# Patient Record
Sex: FEMALE | Race: WHITE | Employment: OTHER | URBAN - METROPOLITAN AREA
[De-identification: names, ages, dates, MRNs, and addresses within clinical notes are randomized per-mention and may not be internally consistent; named-entity substitution may affect disease eponyms.]

---

## 2020-06-06 ENCOUNTER — APPOINTMENT (OUTPATIENT)
Dept: GENERAL RADIOLOGY | Age: 80
End: 2020-06-06
Payer: MEDICARE

## 2020-06-06 ENCOUNTER — HOSPITAL ENCOUNTER (EMERGENCY)
Age: 80
Discharge: HOME | End: 2020-06-06
Payer: MEDICARE

## 2020-06-06 VITALS
TEMPERATURE: 97.7 F | HEIGHT: 63 IN | BODY MASS INDEX: 25.8 KG/M2 | SYSTOLIC BLOOD PRESSURE: 96 MMHG | OXYGEN SATURATION: 96 % | HEART RATE: 76 BPM | DIASTOLIC BLOOD PRESSURE: 55 MMHG | WEIGHT: 145.6 LBS | RESPIRATION RATE: 16 BRPM

## 2020-06-06 PROCEDURE — 73562 X-RAY EXAM OF KNEE 3: CPT

## 2020-06-06 PROCEDURE — 99283 EMERGENCY DEPT VISIT LOW MDM: CPT

## 2020-06-06 RX ORDER — LIDOCAINE 50 MG/G
OINTMENT TOPICAL
Qty: 30 G | Refills: 0 | Status: SHIPPED | OUTPATIENT
Start: 2020-06-06

## 2020-06-06 ASSESSMENT — PAIN DESCRIPTION - ORIENTATION
ORIENTATION: LEFT
ORIENTATION: LEFT

## 2020-06-06 ASSESSMENT — PAIN DESCRIPTION - PAIN TYPE
TYPE: ACUTE PAIN
TYPE: ACUTE PAIN

## 2020-06-06 ASSESSMENT — PAIN DESCRIPTION - LOCATION
LOCATION: KNEE
LOCATION: KNEE

## 2020-06-06 ASSESSMENT — ENCOUNTER SYMPTOMS
ABDOMINAL PAIN: 0
COUGH: 0
BACK PAIN: 0
SHORTNESS OF BREATH: 0

## 2020-06-06 ASSESSMENT — PAIN DESCRIPTION - DESCRIPTORS: DESCRIPTORS: SHARP

## 2020-06-06 ASSESSMENT — PAIN SCALES - GENERAL
PAINLEVEL_OUTOF10: 7
PAINLEVEL_OUTOF10: 8

## 2020-06-06 NOTE — ED PROVIDER NOTES
laxity. Tenderness found. Legs:    Skin:     General: Skin is warm and dry. Neurological:      Mental Status: She is alert and oriented to person, place, and time. Deep Tendon Reflexes: Reflexes are normal and symmetric. Psychiatric:         Judgment: Judgment normal.           All other labs were within normal range or not returned as of this dictation. EMERGENCY DEPARTMENT COURSE and DIFFERENTIALDIAGNOSIS/MDM:   Vitals:    Vitals:    06/06/20 1540   BP: (!) 96/55   Pulse: 76   Resp: 16   Temp: 97.7 °F (36.5 °C)   TempSrc: Oral   SpO2: 96%   Weight: 145 lb 9.6 oz (66 kg)   Height: 5' 3\" (1.6 m)            [de-identified] yr old female with L knee Arthritis. Prescription for Lidocaine ointment was given to the patient. F/U with orthopedics in 1-2 days. Patient/daughter verbalizes understanding. PROCEDURES:  Unless otherwise noted below, none     Procedures      FINAL IMPRESSION      1.  Osteoarthritis of left knee, unspecified osteoarthritis type          DISPOSITION/PLAN   DISPOSITION Decision To Discharge 06/06/2020 04:47:15 PM          RONAL Nichols CNP (electronically signed)  Attending Emergency Physician     RONAL Nichols CNP  06/06/20 2715

## 2020-06-06 NOTE — ED NOTES
D/C instructions and rx x 1 given to patient and daughter by JESSICA Henry. No further complaints. Ambulated out with steady gait in no acute distress.      Rasheed Chapman RN  06/06/20 3691

## 2021-03-25 ENCOUNTER — HOSPITAL ENCOUNTER (EMERGENCY)
Age: 81
Discharge: HOME | End: 2021-03-25
Attending: EMERGENCY MEDICINE
Payer: MEDICARE

## 2021-03-25 ENCOUNTER — APPOINTMENT (OUTPATIENT)
Dept: CT IMAGING | Age: 81
End: 2021-03-25
Payer: MEDICARE

## 2021-03-25 VITALS
HEIGHT: 63 IN | OXYGEN SATURATION: 100 % | RESPIRATION RATE: 16 BRPM | BODY MASS INDEX: 23.04 KG/M2 | SYSTOLIC BLOOD PRESSURE: 178 MMHG | HEART RATE: 70 BPM | TEMPERATURE: 97.3 F | WEIGHT: 130 LBS | DIASTOLIC BLOOD PRESSURE: 82 MMHG

## 2021-03-25 DIAGNOSIS — N30.00 ACUTE CYSTITIS WITHOUT HEMATURIA: Primary | ICD-10-CM

## 2021-03-25 LAB
ALBUMIN SERPL-MCNC: 3.8 G/DL (ref 3.5–4.6)
ALP BLD-CCNC: 84 U/L (ref 40–130)
ALT SERPL-CCNC: 11 U/L (ref 0–33)
ANION GAP SERPL CALCULATED.3IONS-SCNC: 10 MEQ/L (ref 9–15)
AST SERPL-CCNC: 27 U/L (ref 0–35)
BACTERIA: ABNORMAL /HPF
BASOPHILS ABSOLUTE: 0.1 K/UL (ref 0–0.2)
BASOPHILS RELATIVE PERCENT: 0.8 %
BILIRUB SERPL-MCNC: 0.6 MG/DL (ref 0.2–0.7)
BILIRUBIN URINE: NEGATIVE
BLOOD, URINE: ABNORMAL
BUN BLDV-MCNC: 15 MG/DL (ref 8–23)
CALCIUM SERPL-MCNC: 9.6 MG/DL (ref 8.5–9.9)
CHLORIDE BLD-SCNC: 98 MEQ/L (ref 95–107)
CLARITY: CLEAR
CO2: 27 MEQ/L (ref 20–31)
COLOR: ABNORMAL
CREAT SERPL-MCNC: 0.85 MG/DL (ref 0.5–0.9)
EOSINOPHILS ABSOLUTE: 0.5 K/UL (ref 0–0.7)
EOSINOPHILS RELATIVE PERCENT: 7.3 %
EPITHELIAL CELLS, UA: ABNORMAL /HPF (ref 0–5)
GFR AFRICAN AMERICAN: >60
GFR NON-AFRICAN AMERICAN: >60
GLOBULIN: 3.5 G/DL (ref 2.3–3.5)
GLUCOSE BLD-MCNC: 91 MG/DL (ref 70–99)
GLUCOSE URINE: NEGATIVE MG/DL
HCT VFR BLD CALC: 40 % (ref 37–47)
HEMOGLOBIN: 13.3 G/DL (ref 12–16)
HYALINE CASTS: ABNORMAL /HPF (ref 0–5)
KETONES, URINE: ABNORMAL MG/DL
LEUKOCYTE ESTERASE, URINE: ABNORMAL
LIPASE: 37 U/L (ref 12–95)
LYMPHOCYTES ABSOLUTE: 1.5 K/UL (ref 1–4.8)
LYMPHOCYTES RELATIVE PERCENT: 21 %
MCH RBC QN AUTO: 29.2 PG (ref 27–31.3)
MCHC RBC AUTO-ENTMCNC: 33.3 % (ref 33–37)
MCV RBC AUTO: 87.6 FL (ref 82–100)
MONOCYTES ABSOLUTE: 0.6 K/UL (ref 0.2–0.8)
MONOCYTES RELATIVE PERCENT: 7.7 %
NEUTROPHILS ABSOLUTE: 4.6 K/UL (ref 1.4–6.5)
NEUTROPHILS RELATIVE PERCENT: 63.2 %
NITRITE, URINE: NEGATIVE
PDW BLD-RTO: 14.5 % (ref 11.5–14.5)
PH UA: 6 (ref 5–9)
PLATELET # BLD: 348 K/UL (ref 130–400)
PLATELET SLIDE REVIEW: NORMAL
POTASSIUM SERPL-SCNC: 5 MEQ/L (ref 3.4–4.9)
PROTEIN UA: ABNORMAL MG/DL
RBC # BLD: 4.57 M/UL (ref 4.2–5.4)
RBC UA: ABNORMAL /HPF (ref 0–5)
SLIDE REVIEW: NORMAL
SODIUM BLD-SCNC: 135 MEQ/L (ref 135–144)
SPECIFIC GRAVITY UA: 1.02 (ref 1–1.03)
TOTAL PROTEIN: 7.3 G/DL (ref 6.3–8)
URINE REFLEX TO CULTURE: YES
UROBILINOGEN, URINE: 1 E.U./DL
WBC # BLD: 7.3 K/UL (ref 4.8–10.8)
WBC UA: ABNORMAL /HPF (ref 0–5)

## 2021-03-25 PROCEDURE — 6370000000 HC RX 637 (ALT 250 FOR IP): Performed by: PERSONAL EMERGENCY RESPONSE ATTENDANT

## 2021-03-25 PROCEDURE — 87077 CULTURE AEROBIC IDENTIFY: CPT

## 2021-03-25 PROCEDURE — 36415 COLL VENOUS BLD VENIPUNCTURE: CPT

## 2021-03-25 PROCEDURE — 87086 URINE CULTURE/COLONY COUNT: CPT

## 2021-03-25 PROCEDURE — 81001 URINALYSIS AUTO W/SCOPE: CPT

## 2021-03-25 PROCEDURE — 85025 COMPLETE CBC W/AUTO DIFF WBC: CPT

## 2021-03-25 PROCEDURE — 74176 CT ABD & PELVIS W/O CONTRAST: CPT

## 2021-03-25 PROCEDURE — 80053 COMPREHEN METABOLIC PANEL: CPT

## 2021-03-25 PROCEDURE — 96375 TX/PRO/DX INJ NEW DRUG ADDON: CPT

## 2021-03-25 PROCEDURE — 99284 EMERGENCY DEPT VISIT MOD MDM: CPT

## 2021-03-25 PROCEDURE — 96374 THER/PROPH/DIAG INJ IV PUSH: CPT

## 2021-03-25 PROCEDURE — 6360000002 HC RX W HCPCS: Performed by: PHYSICIAN ASSISTANT

## 2021-03-25 PROCEDURE — 83690 ASSAY OF LIPASE: CPT

## 2021-03-25 RX ORDER — CEPHALEXIN 500 MG/1
1000 CAPSULE ORAL ONCE
Status: COMPLETED | OUTPATIENT
Start: 2021-03-25 | End: 2021-03-25

## 2021-03-25 RX ORDER — MORPHINE SULFATE 2 MG/ML
4 INJECTION, SOLUTION INTRAMUSCULAR; INTRAVENOUS ONCE
Status: COMPLETED | OUTPATIENT
Start: 2021-03-25 | End: 2021-03-25

## 2021-03-25 RX ORDER — ONDANSETRON 2 MG/ML
4 INJECTION INTRAMUSCULAR; INTRAVENOUS ONCE
Status: COMPLETED | OUTPATIENT
Start: 2021-03-25 | End: 2021-03-25

## 2021-03-25 RX ORDER — CEPHALEXIN 500 MG/1
1000 CAPSULE ORAL 2 TIMES DAILY
Qty: 28 CAPSULE | Refills: 0 | Status: SHIPPED | OUTPATIENT
Start: 2021-03-25 | End: 2021-04-01

## 2021-03-25 RX ADMIN — MORPHINE SULFATE 4 MG: 2 INJECTION, SOLUTION INTRAMUSCULAR; INTRAVENOUS at 17:52

## 2021-03-25 RX ADMIN — CEPHALEXIN 1000 MG: 500 CAPSULE ORAL at 20:49

## 2021-03-25 RX ADMIN — ONDANSETRON 4 MG: 2 INJECTION INTRAMUSCULAR; INTRAVENOUS at 17:52

## 2021-03-25 ASSESSMENT — ENCOUNTER SYMPTOMS
ABDOMINAL PAIN: 1
ABDOMINAL DISTENTION: 0
RHINORRHEA: 0
BACK PAIN: 1
SHORTNESS OF BREATH: 0
CONSTIPATION: 0
EYE DISCHARGE: 0
COLOR CHANGE: 0
VOMITING: 0
NAUSEA: 1
DIARRHEA: 0
SORE THROAT: 0

## 2021-03-25 ASSESSMENT — PAIN DESCRIPTION - ORIENTATION: ORIENTATION: LOWER

## 2021-03-25 ASSESSMENT — PAIN DESCRIPTION - PAIN TYPE: TYPE: ACUTE PAIN

## 2021-03-25 NOTE — ED PROVIDER NOTES
[address] [facility] ED  eMERGENCY dEPARTMENT eNCOUnter      Pt Name: Felicity Betts  MRN: [id number]  Dalegfjj 1940  Date of evaluation: 3/25/2021  Provider: Shirley Leyva Dr       Chief Complaint   Patient presents with    Abdominal Pain     pt c/o ABD pain for the past two days, denies N/V/D         HISTORY OF PRESENT ILLNESS   (Location/Symptom, Timing/Onset,Context/Setting, Quality, Duration, Modifying Factors, Severity)  Note limiting factors. Felicity Betts is a 79 y.o. female who presents to the emergency department complaint of abdominal pain which patient states started this morning for her. She states her pain is on the low suprapubic area with radiation to the right and left lower quadrant of the abdomen, there is no guarding or rebound, nausea, but no vomiting, she denies any urinary complaints, no constipation or diarrhea. She is rating her current pain at this time is a 10 out of 10 but she seems very comfortable. Last medical history significant for CVA, dementia. HPI    NursingNotes were reviewed. REVIEW OF SYSTEMS    (2-9 systems for level 4, 10 or more for level 5)     Review of Systems   Constitutional: Negative for activity change and appetite change. HENT: Negative for congestion, ear discharge, ear pain, nosebleeds, rhinorrhea and sore throat. Eyes: Negative for discharge. Respiratory: Negative for shortness of breath. Cardiovascular: Negative for chest pain, palpitations and leg swelling. Gastrointestinal: Positive for abdominal pain and nausea. Negative for abdominal distention, constipation, diarrhea and vomiting. Genitourinary: Negative for decreased urine volume, difficulty urinating, dysuria, flank pain, frequency, urgency, vaginal bleeding, vaginal discharge and vaginal pain. Musculoskeletal: Positive for back pain. Negative for arthralgias, myalgias and neck pain. Skin: Negative for color change, pallor, rash and wound. Neurological: Negative for dizziness, tremors, syncope, weakness, numbness and headaches. Psychiatric/Behavioral: Negative for agitation and confusion. Except as noted above the remainder of the review of systems was reviewed and negative. PAST MEDICAL HISTORY     Past Medical History:   Diagnosis Date    [facility]erebral [facility] occlusion [facility] infarction ([facility] [address] [facility]     Dementia ([facility]          SURGICALHISTORY       Past Surgical History:   Procedure Laterality Date    ANKLE FRACTURE SURGERY Left     CHOLECYSTECTOMY      TUBAL LIGATION           CURRENT MEDICATIONS       Previous Medications    LIDOCAINE (XYLOCAINE) 5 % OINTMENT    Apply topically as needed. ALLERGIES     Nuts [peanut-containing drug products] and Shellfish-derived products    FAMILY HISTORY     History reviewed. No pertinent family history. SOCIAL HISTORY       Social History     Socioeconomic History    Marital status:       Spouse name: None    Number of children: None    Years of education: None    Highest education level: None   Occupational History    None   Social Needs    Financial resource strain: None    Food insecurity     Worry: None     Inability: None    Transportation needs     Medical: None     Non-medical: None   Tobacco Use    Smoking status: Former Smoker    Smokeless tobacco: Never Used   Substance and Sexual Activity    Alcohol use: Not Currently    Drug use: Never    Sexual activity: None   Lifestyle    Physical activity     Days per week: None     Minutes per session: None    Stress: None   Relationships    Social connections     Talks on phone: None     Gets together: None     Attends Orthodox service: None     Active member of club or organization: None     Attends meetings of clubs or organizations: None     Relationship status: None    Intimate partner violence     Fear of current or ex partner: None     Emotionally abused: None     Physically abused: None     Forced sexual activity: None   Other Topics Concern    None   Social History Narrative    None       SCREENINGS    Ida Coma Scale  Eye Opening: Spontaneous  Best Verbal Response: Oriented  Best Motor Response: Obeys commands  [place] Coma Scale Score: 15 @FLOW([id number])@      PHYSICAL EXAM    (up to 7 for level 4, 8 or more for level 5)     ED Triage Vitals [03/25/21 1716]   BP Temp Temp Source Pulse Resp SpO2 Height Weight   (!) 142/79 97.3 °F (36.3 °C) Oral 80 16 98 % 5' 3\" (1.6 m) 130 lb (59 kg)       Physical Exam  Vitals signs and nursing note reviewed. Constitutional:       General: She is not in acute distress. Appearance: She is well-developed. She is not ill-appearing, toxic-appearing or diaphoretic. HENT:      Head: Normocephalic. Nose: No congestion. Mouth/Throat:      Mouth: Mucous membranes are moist.      Pharynx: No oropharyngeal exudate or posterior oropharyngeal erythema. Eyes:      Extraocular Movements: Extraocular movements intact. Conjunctiva/sclera: Conjunctivae normal.      Pupils: Pupils are equal, round, and reactive to light. Neck:      Musculoskeletal: Normal range of motion and neck supple. No neck rigidity. Vascular: No JVD. Trachea: No tracheal deviation. Cardiovascular:      Rate and Rhythm: Normal rate. Pulses: Normal pulses. Heart sounds: Normal heart sounds. No murmur. No friction rub. No gallop. Pulmonary:      Effort: Pulmonary effort is normal. No tachypnea, accessory muscle usage, respiratory distress or retractions. Breath sounds: No stridor. No wheezing, rhonchi or rales. Chest:      Chest wall: No tenderness. Abdominal:      General: Abdomen is flat. Bowel sounds are normal. There is no distension or abdominal bruit. Palpations: There is no shifting dullness, fluid wave, hepatomegaly, splenomegaly, mass or pulsatile mass. Tenderness:  There is abdominal tenderness in the right lower quadrant, suprapubic area and left lower quadrant. There is no right CVA tenderness, left CVA tenderness, guarding or rebound. Negative signs include Holland's sign, Rovsing's sign and McBurney's sign. Hernia: No hernia is present. Comments: Patient has mild tenderness on examination to left lower quadrant, mid suprapubic region, and right lower quadrant, there is no guarding mass or rebound. No CVA tenderness. Musculoskeletal:         General: No deformity. Skin:     General: Skin is warm and dry. Capillary Refill: Capillary refill takes less than 2 seconds. Coloration: Skin is not jaundiced. Neurological:      General: No focal deficit present. Mental Status: She is alert and oriented to person, place, and time. Mental status is at baseline. Cranial Nerves: No cranial nerve deficit. Sensory: No sensory deficit. Motor: No weakness. Coordination: Coordination normal.   Psychiatric:         Mood and Affect: Mood normal.         DIAGNOSTIC RESULTS     EKG: All EKG's are interpreted by the Emergency Department Physician who either signs or Co-signsthis chart in the absence of a cardiologist.        RADIOLOGY:   Chloe Gardiner such as CT, Ultrasound and MRI are read by the radiologist. Plain radiographic images are visualized and preliminarily interpreted by the emergency physician with the below findings:        Interpretation per the Radiologist below, if available at the time ofthis note:    CT ABDOMEN PELVIS WO CONTRAST Additional Contrast? None   Final Result   NO ACUTE PATHOLOGY IN THE ABDOMEN OR PELVIS.                ED BEDSIDE ULTRASOUND:   Performed by ED Physician - none    LABS:  Labs Reviewed   COMPREHENSIVE METABOLIC PANEL - Abnormal; Notable for the following components:       Result Value    Potassium 5.0 (*)     All other components within normal limits   URINE RT REFLEX TO CULTURE - Abnormal; Notable for the following components:    Color, UA DARK YELLOW (*) Ketones, Urine TRACE (*)     Blood, Urine SMALL (*)     Protein, UA TRACE (*)     Leukocyte Esterase, Urine MODERATE (*)     All other components within normal limits   MICROSCOPIC URINALYSIS - Abnormal; Notable for the following components:    Bacteria, UA RARE (*)     WBC, UA 20-50 (*)     RBC, UA 10-20 (*)     All other components within normal limits   CULTURE, URINE   CBC WITH AUTO DIFFERENTIAL   LIPASE       All other labs were within normal range or not returned as of this dictation. EMERGENCY DEPARTMENT COURSE and DIFFERENTIAL DIAGNOSIS/MDM:   Vitals:    Vitals:    03/25/21 1716 03/25/21 1937   BP: (!) 142/79 (!) 173/73   Pulse: 80 65   Resp: 16 20   Temp: 97.3 °F (36.3 °C)    TempSrc: Oral    SpO2: 98% 95%   Weight: 130 lb (59 kg)    Height: 5' 3\" (1.6 m)             MDM    CT of the abdomen shows no acute process. Moderate stool throughout colon. Lab work unremarkable. Patient does have urinary tract infection. She will be started on Keflex. On reassessment there is no abdominal tenderness to palpation. Patient appears nontoxic in no apparent distress. Standard anticipatory guidance given to patient upon discharge. Have given them a specific time frame in which to follow-up and who to follow-up with. I have also advised them that they should return to the emergency department if they get worse, or not getting better or develop any new or concerning symptoms. Patient demonstrates understanding. CRITICAL CARE TIME   Total Critical Care time was 0 minutes, excluding separately reportableprocedures. There was a high probability of clinicallysignificant/life threatening deterioration in the patient's condition which required my urgent intervention. CONSULTS:  None    PROCEDURES:  Unless otherwise noted below, none     Procedures    FINAL IMPRESSION      1.  Acute cystitis without hematuria          DISPOSITION/PLAN   DISPOSITION Decision To Discharge 03/25/2021 08:33:10 PM      PATIENT

## 2021-03-26 NOTE — ED NOTES
Written and verbal d/c instructions reviewed with daughter. Instructed on f/u care and medications. Verbalized understanding.      Rodolfo Johnson RN  03/25/21 2763

## 2021-03-27 LAB
ORGANISM: ABNORMAL
URINE CULTURE, ROUTINE: ABNORMAL

## 2021-04-23 ENCOUNTER — HOSPITAL ENCOUNTER (EMERGENCY)
Age: 81
Discharge: HOME | End: 2021-04-24
Payer: MEDICARE

## 2021-04-23 VITALS
HEIGHT: 63 IN | BODY MASS INDEX: 20.38 KG/M2 | WEIGHT: 115 LBS | HEART RATE: 73 BPM | SYSTOLIC BLOOD PRESSURE: 138 MMHG | RESPIRATION RATE: 18 BRPM | TEMPERATURE: 97.4 F | OXYGEN SATURATION: 99 % | DIASTOLIC BLOOD PRESSURE: 78 MMHG

## 2021-04-23 DIAGNOSIS — L30.9 DERMATITIS: Primary | ICD-10-CM

## 2021-04-23 PROCEDURE — 99282 EMERGENCY DEPT VISIT SF MDM: CPT

## 2021-04-23 PROCEDURE — 96374 THER/PROPH/DIAG INJ IV PUSH: CPT

## 2021-04-23 PROCEDURE — 96375 TX/PRO/DX INJ NEW DRUG ADDON: CPT

## 2021-04-24 LAB
ALBUMIN SERPL-MCNC: 3.8 G/DL (ref 3.5–4.6)
ALP BLD-CCNC: 76 U/L (ref 40–130)
ALT SERPL-CCNC: <5 U/L (ref 0–33)
ANION GAP SERPL CALCULATED.3IONS-SCNC: 6 MEQ/L (ref 9–15)
AST SERPL-CCNC: 13 U/L (ref 0–35)
BASOPHILS ABSOLUTE: 0.1 K/UL (ref 0–0.2)
BASOPHILS RELATIVE PERCENT: 0.9 %
BILIRUB SERPL-MCNC: 0.3 MG/DL (ref 0.2–0.7)
BUN BLDV-MCNC: 17 MG/DL (ref 8–23)
CALCIUM SERPL-MCNC: 9 MG/DL (ref 8.5–9.9)
CHLORIDE BLD-SCNC: 101 MEQ/L (ref 95–107)
CO2: 31 MEQ/L (ref 20–31)
CREAT SERPL-MCNC: 0.97 MG/DL (ref 0.5–0.9)
EOSINOPHILS ABSOLUTE: 0.8 K/UL (ref 0–0.7)
EOSINOPHILS RELATIVE PERCENT: 13.8 %
GFR AFRICAN AMERICAN: >60
GFR NON-AFRICAN AMERICAN: 55.1
GLOBULIN: 2.9 G/DL (ref 2.3–3.5)
GLUCOSE BLD-MCNC: 85 MG/DL (ref 70–99)
HCT VFR BLD CALC: 38.1 % (ref 37–47)
HEMOGLOBIN: 12.5 G/DL (ref 12–16)
LYMPHOCYTES ABSOLUTE: 1.3 K/UL (ref 1–4.8)
LYMPHOCYTES RELATIVE PERCENT: 20.6 %
MAGNESIUM: 1.9 MG/DL (ref 1.7–2.4)
MCH RBC QN AUTO: 28.9 PG (ref 27–31.3)
MCHC RBC AUTO-ENTMCNC: 32.8 % (ref 33–37)
MCV RBC AUTO: 88.1 FL (ref 82–100)
MONOCYTES ABSOLUTE: 0.6 K/UL (ref 0.2–0.8)
MONOCYTES RELATIVE PERCENT: 10 %
NEUTROPHILS ABSOLUTE: 3.3 K/UL (ref 1.4–6.5)
NEUTROPHILS RELATIVE PERCENT: 54.7 %
PDW BLD-RTO: 15 % (ref 11.5–14.5)
PLATELET # BLD: 292 K/UL (ref 130–400)
POTASSIUM SERPL-SCNC: 4 MEQ/L (ref 3.4–4.9)
RBC # BLD: 4.32 M/UL (ref 4.2–5.4)
SODIUM BLD-SCNC: 138 MEQ/L (ref 135–144)
TOTAL PROTEIN: 6.7 G/DL (ref 6.3–8)
WBC # BLD: 6.1 K/UL (ref 4.8–10.8)

## 2021-04-24 PROCEDURE — 83735 ASSAY OF MAGNESIUM: CPT

## 2021-04-24 PROCEDURE — 80053 COMPREHEN METABOLIC PANEL: CPT

## 2021-04-24 PROCEDURE — 6360000002 HC RX W HCPCS: Performed by: PHYSICIAN ASSISTANT

## 2021-04-24 PROCEDURE — 36415 COLL VENOUS BLD VENIPUNCTURE: CPT

## 2021-04-24 PROCEDURE — 85025 COMPLETE CBC W/AUTO DIFF WBC: CPT

## 2021-04-24 RX ORDER — METHYLPREDNISOLONE SODIUM SUCCINATE 125 MG/2ML
60 INJECTION, POWDER, LYOPHILIZED, FOR SOLUTION INTRAMUSCULAR; INTRAVENOUS ONCE
Status: COMPLETED | OUTPATIENT
Start: 2021-04-24 | End: 2021-04-24

## 2021-04-24 RX ORDER — METHYLPREDNISOLONE 4 MG/1
TABLET ORAL
Qty: 1 KIT | Refills: 0 | Status: SHIPPED | OUTPATIENT
Start: 2021-04-24 | End: 2021-04-30

## 2021-04-24 RX ORDER — DIPHENHYDRAMINE HYDROCHLORIDE 50 MG/ML
25 INJECTION INTRAMUSCULAR; INTRAVENOUS ONCE
Status: COMPLETED | OUTPATIENT
Start: 2021-04-24 | End: 2021-04-24

## 2021-04-24 RX ADMIN — DIPHENHYDRAMINE HYDROCHLORIDE 25 MG: 50 INJECTION, SOLUTION INTRAMUSCULAR; INTRAVENOUS at 00:50

## 2021-04-24 RX ADMIN — METHYLPREDNISOLONE SODIUM SUCCINATE 60 MG: 125 INJECTION, POWDER, FOR SOLUTION INTRAMUSCULAR; INTRAVENOUS at 00:51

## 2021-04-24 ASSESSMENT — ENCOUNTER SYMPTOMS
VOICE CHANGE: 0
PHOTOPHOBIA: 0
APNEA: 0
ABDOMINAL DISTENTION: 0
COUGH: 0
NAUSEA: 0
ANAL BLEEDING: 0
EYE DISCHARGE: 0
VOMITING: 0

## 2021-04-24 NOTE — ED TRIAGE NOTES
Patient arrived from home with c/o pain in her legs, back and shoulders x 2 days. Patient denies injury. Patient rates pain a 6/10 and describes pain as a throbbing pain. Denies any other symptoms. Vitals WNL.

## 2021-05-15 ENCOUNTER — HOSPITAL ENCOUNTER (EMERGENCY)
Age: 81
Discharge: HOME | End: 2021-05-15
Payer: MEDICARE

## 2021-05-15 VITALS
TEMPERATURE: 98.6 F | HEIGHT: 63 IN | BODY MASS INDEX: 20.38 KG/M2 | RESPIRATION RATE: 18 BRPM | OXYGEN SATURATION: 100 % | SYSTOLIC BLOOD PRESSURE: 131 MMHG | WEIGHT: 115 LBS | HEART RATE: 85 BPM | DIASTOLIC BLOOD PRESSURE: 95 MMHG

## 2021-05-15 DIAGNOSIS — L20.9 ATOPIC DERMATITIS, UNSPECIFIED TYPE: Primary | ICD-10-CM

## 2021-05-15 PROCEDURE — 99284 EMERGENCY DEPT VISIT MOD MDM: CPT

## 2021-05-15 PROCEDURE — 6360000002 HC RX W HCPCS: Performed by: PHYSICIAN ASSISTANT

## 2021-05-15 PROCEDURE — 96372 THER/PROPH/DIAG INJ SC/IM: CPT

## 2021-05-15 RX ORDER — HYDROXYZINE HYDROCHLORIDE 50 MG/ML
25 INJECTION, SOLUTION INTRAMUSCULAR ONCE
Status: COMPLETED | OUTPATIENT
Start: 2021-05-15 | End: 2021-05-15

## 2021-05-15 RX ORDER — PREDNISONE 10 MG/1
TABLET ORAL
Qty: 30 TABLET | Refills: 0 | Status: SHIPPED | OUTPATIENT
Start: 2021-05-15 | End: 2021-05-25

## 2021-05-15 RX ORDER — BETAMETHASONE DIPROPIONATE 0.05 %
OINTMENT (GRAM) TOPICAL
Qty: 1 TUBE | Refills: 1 | Status: SHIPPED | OUTPATIENT
Start: 2021-05-15

## 2021-05-15 RX ORDER — DIPHENHYDRAMINE HYDROCHLORIDE 50 MG/ML
25 INJECTION INTRAMUSCULAR; INTRAVENOUS ONCE
Status: DISCONTINUED | OUTPATIENT
Start: 2021-05-15 | End: 2021-05-15

## 2021-05-15 RX ORDER — HYDROXYZINE PAMOATE 25 MG/1
25 CAPSULE ORAL 3 TIMES DAILY PRN
Qty: 30 CAPSULE | Refills: 0 | Status: SHIPPED | OUTPATIENT
Start: 2021-05-15 | End: 2021-05-29

## 2021-05-15 RX ORDER — METHYLPREDNISOLONE ACETATE 80 MG/ML
80 INJECTION, SUSPENSION INTRA-ARTICULAR; INTRALESIONAL; INTRAMUSCULAR; SOFT TISSUE ONCE
Status: COMPLETED | OUTPATIENT
Start: 2021-05-15 | End: 2021-05-15

## 2021-05-15 RX ADMIN — METHYLPREDNISOLONE ACETATE 80 MG: 80 INJECTION, SUSPENSION INTRA-ARTICULAR; INTRALESIONAL; INTRAMUSCULAR; SOFT TISSUE at 23:41

## 2021-05-15 RX ADMIN — HYDROXYZINE HYDROCHLORIDE 25 MG: 50 INJECTION, SOLUTION INTRAMUSCULAR at 23:41

## 2021-05-15 ASSESSMENT — ENCOUNTER SYMPTOMS
TROUBLE SWALLOWING: 0
COLOR CHANGE: 0
ABDOMINAL PAIN: 0
ALLERGIC/IMMUNOLOGIC NEGATIVE: 1
SHORTNESS OF BREATH: 0
EYE PAIN: 0
APNEA: 0

## 2021-05-16 NOTE — ED PROVIDER NOTES
[address] [facility] ED  eMERGENCYdEPARTMENT eNCOUnter      Pt Name: Edgardo Gamez  MRN: [id number]  Armsdlgfjj 1940  Date of evaluation: 5/15/2021  Provider:Anthony Reid PA-C    CHIEF COMPLAINT       Chief Complaint   Patient presents with    Rash         HISTORY OF PRESENT ILLNESS  (Location/Symptom, Timing/Onset, Context/Setting, Quality, Duration, Modifying Factors, Severity.)   Edgardo Gamez is a 79 y.o. female who presents to the emergency department with a diffusely itchy rash that has been ongoing for greater than 1 month. Patient has been seen by family doctor and emergency departments for the same complaints and has gone through rounds of steroids including prednisone and methylprednisolone. Daughter states that she gets some relief while on the steroids but then symptoms acutely worsen. Itching has gotten worse over the past 2 to 3 days with some excoriations to the area. There has been no fevers. Patient is scheduled to see allergy and immunology at the end of the month but daughter states that she cannot take the symptoms anymore. There has been no fevers or chills. No chest tightness or shortness of breath. No angioedema symptoms. All topical have been switched to hypoallergenic    HPI    Nursing Notes were reviewed and I agree. REVIEW OF SYSTEMS    (2-9 systems for level 4, 10 or more for level 5)     Review of Systems   Constitutional: Negative for diaphoresis and fever. HENT: Negative for hearing loss and trouble swallowing. Eyes: Negative for pain. Respiratory: Negative for apnea and shortness of breath. Cardiovascular: Negative for chest pain. Gastrointestinal: Negative for abdominal pain. Endocrine: Negative. Genitourinary: Negative for hematuria. Musculoskeletal: Negative for neck pain and neck stiffness. Skin: Positive for rash. Negative for color change. Allergic/Immunologic: Negative. Neurological: Negative for dizziness and numbness. Hematological: Negative. Psychiatric/Behavioral: Negative. All other systems reviewed and are negative. Except as noted above the remainder of the review of systems was reviewed and negative. PAST MEDICAL HISTORY     Past Medical History:   Diagnosis Date    [facility] infarction ([facility] [address] [facility]     Dementia ([facility]          SURGICAL HISTORY       Past Surgical History:   Procedure Laterality Date    ANKLE FRACTURE SURGERY Left     CHOLECYSTECTOMY      TUBAL LIGATION           CURRENT MEDICATIONS       Previous Medications    LIDOCAINE (XYLOCAINE) 5 % OINTMENT    Apply topically as needed. ALLERGIES     Nuts [peanut-containing drug products] and Shellfish-derived products    FAMILY HISTORY     History reviewed. No pertinent family history. SOCIAL HISTORY       Social History     Socioeconomic History    Marital status:      Spouse name: None    Number of children: None    Years of education: None    Highest education level: None   Occupational History    None   Tobacco Use    Smoking status: Former Smoker    Smokeless tobacco: Never Used   Vaping Use    Vaping Use: Never used   Substance and Sexual Activity    Alcohol use: Not Currently    Drug use: Never    Sexual activity: None   Other Topics Concern    None   Social History Narrative    None     Social Determinants of Health     Financial Resource Strain:     Difficulty of Paying Living Expenses:    Food Insecurity:     Worried About Running Out of Food in the Last Year:     Ran Out of Food in the Last Year:    Transportation Needs:     Lack of Transportation (Medical):      Lack of Transportation (Non-Medical):    Physical Activity:     Days of Exercise per Week:     Minutes of Exercise per Session:    Stress:     Feeling of Stress :    Social Connections:     Frequency of Communication with Friends and Family:     Frequency of Social Gatherings with Friends and Family:     Attends Taoism Services:     Active Member of Clubs or Organizations:     Attends Club or Organization Meetings:     Marital Status:    Intimate Partner Violence:     Fear of Current or Ex-Partner:     Emotionally Abused:     Physically Abused:     Sexually Abused:        SCREENINGS           PHYSICAL EXAM    (up to 7 forlevel 4, 8 or more for level 5)     ED Triage Vitals   BP Temp Temp src Pulse Resp SpO2 Height Weight   05/15/21 2249 05/15/21 2248 -- 05/15/21 2249 05/15/21 2248 05/15/21 2249 05/15/21 2248 05/15/21 2248   132/66 98.6 °F (37 °C)  85 18 97 % 5' 3\" (1.6 m) 115 lb (52.2 kg)       Physical Exam  Vitals and nursing note reviewed. Constitutional:       General: She is not in acute distress. Appearance: She is well-developed. She is not diaphoretic. HENT:      Head: Normocephalic and atraumatic. Mouth/Throat:      Pharynx: No oropharyngeal exudate. Eyes:      General: No scleral icterus. Conjunctiva/sclera: Conjunctivae normal.      Pupils: Pupils are equal, round, and reactive to light. Neck:      Trachea: No tracheal deviation. Cardiovascular:      Rate and Rhythm: Normal rate. Heart sounds: Normal heart sounds. Pulmonary:      Effort: Pulmonary effort is normal. No respiratory distress. Breath sounds: Normal breath sounds. Abdominal:      General: Bowel sounds are normal. There is no distension. Palpations: Abdomen is soft. Musculoskeletal:         General: Normal range of motion. Cervical back: Normal range of motion and neck supple. Skin:     General: Skin is warm and dry. Findings: Rash present. No erythema. Rash is urticarial.          Neurological:      Mental Status: She is alert and oriented to person, place, and time. Cranial Nerves: No cranial nerve deficit. Motor: No abnormal muscle tone. Psychiatric:         Behavior: Behavior normal.         Thought Content:  Thought content normal.         Judgment: Judgment

## 2021-10-26 ENCOUNTER — APPOINTMENT (OUTPATIENT)
Dept: CT IMAGING | Age: 81
End: 2021-10-26
Payer: MEDICARE

## 2021-10-26 ENCOUNTER — APPOINTMENT (OUTPATIENT)
Dept: GENERAL RADIOLOGY | Age: 81
End: 2021-10-26
Payer: MEDICARE

## 2021-10-26 ENCOUNTER — HOSPITAL ENCOUNTER (EMERGENCY)
Age: 81
Discharge: HOME | End: 2021-10-26
Attending: EMERGENCY MEDICINE
Payer: MEDICARE

## 2021-10-26 VITALS
WEIGHT: 110 LBS | DIASTOLIC BLOOD PRESSURE: 74 MMHG | HEIGHT: 63 IN | RESPIRATION RATE: 16 BRPM | HEART RATE: 73 BPM | SYSTOLIC BLOOD PRESSURE: 143 MMHG | OXYGEN SATURATION: 98 % | TEMPERATURE: 98.3 F | BODY MASS INDEX: 19.49 KG/M2
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
## 2021-10-26 PROCEDURE — 99285 EMERGENCY DEPT VISIT HI MDM: CPT

## 2021-10-26 PROCEDURE — 71045 X-RAY EXAM CHEST 1 VIEW: CPT

## 2021-10-26 PROCEDURE — 70450 CT HEAD/BRAIN W/O DYE: CPT

## 2021-10-26 PROCEDURE — 73564 X-RAY EXAM KNEE 4 OR MORE: CPT

## 2021-10-26 PROCEDURE — 73000 X-RAY EXAM OF COLLAR BONE: CPT

## 2021-10-26 PROCEDURE — 73060 X-RAY EXAM OF HUMERUS: CPT

## 2021-10-26 PROCEDURE — 72125 CT NECK SPINE W/O DYE: CPT

## 2021-10-26 ASSESSMENT — PAIN DESCRIPTION - ORIENTATION: ORIENTATION: RIGHT

## 2021-10-26 ASSESSMENT — PAIN DESCRIPTION - PAIN TYPE: TYPE: ACUTE PAIN

## 2021-10-26 ASSESSMENT — PAIN DESCRIPTION - LOCATION: LOCATION: KNEE

## 2021-10-26 ASSESSMENT — PAIN SCALES - GENERAL: PAINLEVEL_OUTOF10: 4

## 2021-10-27 NOTE — ED PROVIDER NOTES
[address] [facility] ED  EMERGENCY MEDICINE     Pt Name: Arthur Lafleur  MRN: [id number]  [place] 1940  Date of evaluation: 10/26/2021  PCP:    RONAL Levi - CODEY  Provider: Ameena Londono DO    CHIEF COMPLAINT       Chief Complaint   Patient presents with    Fall       HISTORY OF PRESENT ILLNESS    HPI     65-year-old female presents to the emergency department with her daughter for complaint of a fall. Patient states that she was at a restaurant and was trying to get up to go to the bathroom and stumbled upon some chairs and stated that she fell. Daughter did not see her fall and did not know if she hit her head. Patient does have a history of dementia and states that she \"does not really remember what happened\". Patient states that her right shoulder hurts and daughter does think that she tried to brace herself with her right arm. Patient denies any chest pain or shortness of breath. Denies any headache. No signs of trauma on her head. Denies any vision changes. No leg pain. She does have redness on her right knee but is able to walk around. She was able to be helped up and walk. No blood thinners. Triage notes and Nursing notes were reviewed by myself. Any discrepancies are addressed above. 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 [address]     Dementia ([facility] [address]        SURGICAL HISTORY       Past Surgical History:   Procedure Laterality Date    ANKLE FRACTURE SURGERY Left     CHOLECYSTECTOMY      TUBAL LIGATION         CURRENT MEDICATIONS       Discharge Medication List as of 10/26/2021 11:10 PM      CONTINUE these medications which have NOT CHANGED    Details   betamethasone dipropionate (DIPROLENE) 0.05 % ointment Apply topically 2 times daily. , Disp-1 Tube, R-1, Print      lidocaine (XYLOCAINE) 5 % ointment Apply topically as needed. , Disp-30 g, R-0, Print             ALLERGIES       Allergies Allergen Reactions    Nuts [Peanut-Containing Drug Products] Hives     [place] nuts only    Shellfish-Derived Products Hives       FAMILY HISTORY     History reviewed. No pertinent family history. SOCIAL HISTORY       Social History     Socioeconomic History    Marital status:      Spouse name: None    Number of children: None    Years of education: None    Highest education level: None   Occupational History    None   Tobacco Use    Smoking status: Former Smoker    Smokeless tobacco: Never Used   Vaping Use    Vaping Use: Never used   Substance and Sexual Activity    Alcohol use: Not Currently    Drug use: Never    Sexual activity: None   Other Topics Concern    None   Social History Narrative    None     Social Determinants of Health     Financial Resource Strain:     Difficulty of Paying Living Expenses:    Food Insecurity:     Worried About Running Out of Food in the Last Year:     Ran Out of Food in the Last Year:    Transportation Needs:     Lack of Transportation (Medical):  Lack of Transportation (Non-Medical):    Physical Activity:     Days of Exercise per Week:     Minutes of Exercise per Session:    Stress:     Feeling of Stress :    Social Connections:     Frequency of Communication with Friends and Family:     Frequency of Social Gatherings with Friends and Family:     Attends Yarsanism Services:     Active Member of Clubs or Organizations:     Attends Club or Organization Meetings:     Marital Status:    Intimate Partner Violence:     Fear of Current or Ex-Partner:     Emotionally Abused:     Physically Abused:     Sexually Abused:        REVIEW OF SYSTEMS     Review of Systems   Musculoskeletal: Positive for arthralgias and myalgias. Right arm pain       Except as noted above the remainder of the review of systems was reviewed and is negative.   SCREENINGS        Ida Coma Scale  Eye Opening: Spontaneous  Best Verbal Response: Confused (dementia)  Best Motor Response: Obeys commands  Errol Coma Scale Score: 14               PHYSICAL EXAM    (up to 7 for level 4, 8 or more for level 5)     ED Triage Vitals [10/26/21 2011]   BP Temp Temp Source Pulse Resp SpO2 Height Weight   120/68 98.3 °F (36.8 °C) Oral 81 18 99 % 5' 3\" (1.6 m) 110 lb (49.9 kg)       Physical Exam  Constitutional:       General: She is not in acute distress. Appearance: Normal appearance. She is normal weight. She is not ill-appearing. HENT:      Head: Normocephalic and atraumatic. Right Ear: External ear normal.      Left Ear: External ear normal.      Nose: Nose normal. No congestion or rhinorrhea. Mouth/Throat:      Mouth: Mucous membranes are moist.      Pharynx: No oropharyngeal exudate or posterior oropharyngeal erythema. Eyes:      General:         Right eye: No discharge. Left eye: No discharge. Extraocular Movements: Extraocular movements intact. Pupils: Pupils are equal, round, and reactive to light. Cardiovascular:      Rate and Rhythm: Normal rate and regular rhythm. Pulses: Normal pulses. Pulmonary:      Effort: Pulmonary effort is normal.      Breath sounds: Normal breath sounds. Abdominal:      General: Abdomen is flat. Bowel sounds are normal. There is no distension. Tenderness: There is no abdominal tenderness. There is no right CVA tenderness, left CVA tenderness, guarding or rebound. Musculoskeletal:         General: Tenderness (Right anterior humerus) present. No swelling. Normal range of motion. Cervical back: Normal range of motion and neck supple. Right lower leg: No edema. Left lower leg: No edema. Skin:     General: Skin is warm and dry. Capillary Refill: Capillary refill takes less than 2 seconds. Comments: Slight superficial erythema along the right knee, full range of motion. compartments are soft. Neurological:      General: No focal deficit present.       Mental Status: She is alert and oriented to person, place, and time. Comments: 5 out of 5 strength in upper and lower extremities. Sensations intact in all 4 extremities. DP and PT pulses intact, radial pulses intact. Psychiatric:         Mood and Affect: Mood normal.           DIAGNOSTIC RESULTS     EKG:(none if blank)  All EKGs are interpreted by the Emergency Department Physician who either signs or Co-signs this chart in the absence of a cardiologist.        RADIOLOGY: (none if blank)   I directly visualized the following images and reviewed the radiologist interpretations. Interpretation per the Radiologist below, if available at the time of this note:  XR KNEE RIGHT (MIN 4 VIEWS)    (Results Pending)   CT Head WO Contrast    (Results Pending)   CT CERVICAL SPINE WO CONTRAST    (Results Pending)   XR HUMERUS RIGHT (MIN 2 VIEWS)    (Results Pending)   XR CLAVICLE RIGHT    (Results Pending)   XR CHEST PORTABLE    (Results Pending)       LABS:  Labs Reviewed - No data to display    All other labs were within normal range or not returned as of this dictation. Please note, any cultures that may have been sent were not resulted at the time of this patient visit. EMERGENCY DEPARTMENT COURSE and Medical Decision Making:     Vitals:    Vitals:    10/26/21 2011 10/26/21 2115   BP: 120/68 (!) 143/74   Pulse: 81 73   Resp: 18 16   Temp: 98.3 °F (36.8 °C)    TempSrc: Oral    SpO2: 99% 98%   Weight: 110 lb (49.9 kg)    Height: 5' 3\" (1.6 m)        PROCEDURES: (None if blank)  Procedures       MDM     Stat rad read CT head without contrast shows no acute intracranial hemorrhage, no acute infarct. Ventricles are normal in size. Chronic periventricular ischemic demyelination changes seen due to small vessel disease. CT C-spine stat rad read shows no acute fracture. There is a C5-6 and C6-7 chronic degenerative disc disease with disc space narrowing. Patient is able to get up and ambulate. Her scans were negative. Patient will be discharged in stable condition. Strict return precautions and follow up instructions were discussed with the patient with which the patient agrees    ED Medications administered this visit:  Medications - No data to display      FINAL IMPRESSION      1.  Fall from standing, initial encounter          DISPOSITION/PLAN   DISPOSITION        PATIENT REFERRED TO:  RONAL Pratt - CODEY  [address] [address]  [phone]            DISCHARGE MEDICATIONS:  Discharge Medication List as of 10/26/2021 11:10 PM                 Lola Bernstein DO (electronically signed)  Attending Physician, Emergency Department         Lola Bernstein DO  10/26/21 9998

## 2021-10-27 NOTE — ED NOTES
Patient ambulatory, able to move all 4 extremities without pain, denies pain. Patient has small skin abrasion on right knee.          Dixon Underwood RN  10/26/21 2120

## 2022-07-04 ENCOUNTER — APPOINTMENT (OUTPATIENT)
Dept: GENERAL RADIOLOGY | Age: 82
End: 2022-07-04
Payer: MEDICARE

## 2022-07-04 ENCOUNTER — APPOINTMENT (OUTPATIENT)
Dept: CT IMAGING | Age: 82
End: 2022-07-04
Payer: MEDICARE

## 2022-07-04 ENCOUNTER — HOSPITAL ENCOUNTER (EMERGENCY)
Age: 82
Discharge: HOME | End: 2022-07-04
Payer: MEDICARE

## 2022-07-04 VITALS
WEIGHT: 130 LBS | BODY MASS INDEX: 22.2 KG/M2 | HEIGHT: 64 IN | RESPIRATION RATE: 18 BRPM | DIASTOLIC BLOOD PRESSURE: 79 MMHG | OXYGEN SATURATION: 100 % | SYSTOLIC BLOOD PRESSURE: 196 MMHG | HEART RATE: 70 BPM | TEMPERATURE: 97.2 F
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
## 2022-07-04 DIAGNOSIS — S40.011A CONTUSION OF RIGHT SHOULDER, INITIAL ENCOUNTER: ICD-10-CM

## 2022-07-04 DIAGNOSIS — S09.90XA CLOSED HEAD INJURY, INITIAL ENCOUNTER: Primary | ICD-10-CM

## 2022-07-04 DIAGNOSIS — S40.211A ABRASION OF RIGHT SHOULDER, INITIAL ENCOUNTER: ICD-10-CM

## 2022-07-04 LAB
ALBUMIN SERPL-MCNC: 4 G/DL (ref 3.5–4.6)
ALP BLD-CCNC: 95 U/L (ref 40–130)
ALT SERPL-CCNC: <5 U/L (ref 0–33)
ANION GAP SERPL CALCULATED.3IONS-SCNC: 11 MEQ/L (ref 9–15)
APTT: 30.2 SEC (ref 24.4–36.8)
AST SERPL-CCNC: 11 U/L (ref 0–35)
BASOPHILS ABSOLUTE: 0.1 K/UL (ref 0–0.2)
BASOPHILS RELATIVE PERCENT: 0.9 %
BILIRUB SERPL-MCNC: 0.3 MG/DL (ref 0.2–0.7)
BUN BLDV-MCNC: 20 MG/DL (ref 8–23)
CALCIUM SERPL-MCNC: 9 MG/DL (ref 8.5–9.9)
CHLORIDE BLD-SCNC: 100 MEQ/L (ref 95–107)
CO2: 29 MEQ/L (ref 20–31)
CREAT SERPL-MCNC: 1.08 MG/DL (ref 0.5–0.9)
EOSINOPHILS ABSOLUTE: 0.5 K/UL (ref 0–0.7)
EOSINOPHILS RELATIVE PERCENT: 6 %
ETHANOL PERCENT: NORMAL G/DL
ETHANOL: <10 MG/DL (ref 0–0.08)
GFR AFRICAN AMERICAN: 58.7
GFR NON-AFRICAN AMERICAN: 48.5
GLOBULIN: 2.7 G/DL (ref 2.3–3.5)
GLUCOSE BLD-MCNC: 91 MG/DL (ref 70–99)
HCT VFR BLD CALC: 39.7 % (ref 37–47)
HEMOGLOBIN: 12.9 G/DL (ref 12–16)
INR BLD: 1.1
LYMPHOCYTES ABSOLUTE: 1.6 K/UL (ref 1–4.8)
LYMPHOCYTES RELATIVE PERCENT: 19.2 %
MCH RBC QN AUTO: 29.2 PG (ref 27–31.3)
MCHC RBC AUTO-ENTMCNC: 32.5 % (ref 33–37)
MCV RBC AUTO: 90 FL (ref 82–100)
MONOCYTES ABSOLUTE: 0.7 K/UL (ref 0.2–0.8)
MONOCYTES RELATIVE PERCENT: 8.1 %
NEUTROPHILS ABSOLUTE: 5.5 K/UL (ref 1.4–6.5)
NEUTROPHILS RELATIVE PERCENT: 65.8 %
PDW BLD-RTO: 14.4 % (ref 11.5–14.5)
PLATELET # BLD: 342 K/UL (ref 130–400)
POTASSIUM SERPL-SCNC: 3.8 MEQ/L (ref 3.4–4.9)
PROTHROMBIN TIME: 13.8 SEC (ref 12.3–14.9)
RBC # BLD: 4.41 M/UL (ref 4.2–5.4)
SODIUM BLD-SCNC: 140 MEQ/L (ref 135–144)
TOTAL PROTEIN: 6.7 G/DL (ref 6.3–8)
WBC # BLD: 8.3 K/UL (ref 4.8–10.8)

## 2022-07-04 PROCEDURE — 73060 X-RAY EXAM OF HUMERUS: CPT

## 2022-07-04 PROCEDURE — 72128 CT CHEST SPINE W/O DYE: CPT

## 2022-07-04 PROCEDURE — 82077 ASSAY SPEC XCP UR&BREATH IA: CPT

## 2022-07-04 PROCEDURE — 85730 THROMBOPLASTIN TIME PARTIAL: CPT

## 2022-07-04 PROCEDURE — 72131 CT LUMBAR SPINE W/O DYE: CPT

## 2022-07-04 PROCEDURE — 93005 ELECTROCARDIOGRAM TRACING: CPT | Performed by: PHYSICIAN ASSISTANT

## 2022-07-04 PROCEDURE — 73030 X-RAY EXAM OF SHOULDER: CPT

## 2022-07-04 PROCEDURE — 72125 CT NECK SPINE W/O DYE: CPT

## 2022-07-04 PROCEDURE — 80053 COMPREHEN METABOLIC PANEL: CPT

## 2022-07-04 PROCEDURE — 70450 CT HEAD/BRAIN W/O DYE: CPT

## 2022-07-04 PROCEDURE — 85610 PROTHROMBIN TIME: CPT

## 2022-07-04 PROCEDURE — 73080 X-RAY EXAM OF ELBOW: CPT

## 2022-07-04 PROCEDURE — 6370000000 HC RX 637 (ALT 250 FOR IP): Performed by: PHYSICIAN ASSISTANT

## 2022-07-04 PROCEDURE — 85025 COMPLETE CBC W/AUTO DIFF WBC: CPT

## 2022-07-04 PROCEDURE — 36415 COLL VENOUS BLD VENIPUNCTURE: CPT

## 2022-07-04 PROCEDURE — 99285 EMERGENCY DEPT VISIT HI MDM: CPT

## 2022-07-04 RX ORDER — DIAPER,BRIEF,INFANT-TODD,DISP
EACH MISCELLANEOUS ONCE
Status: COMPLETED | OUTPATIENT
Start: 2022-07-04 | End: 2022-07-04

## 2022-07-04 RX ADMIN — BACITRACIN ZINC: 500 OINTMENT TOPICAL at 17:33

## 2022-07-04 ASSESSMENT — ENCOUNTER SYMPTOMS
CONSTIPATION: 0
COLOR CHANGE: 0
ABDOMINAL DISTENTION: 0
SORE THROAT: 0
EYE DISCHARGE: 0
RHINORRHEA: 0
SHORTNESS OF BREATH: 0
ABDOMINAL PAIN: 0

## 2022-07-04 ASSESSMENT — PAIN DESCRIPTION - DESCRIPTORS: DESCRIPTORS: PATIENT UNABLE TO DESCRIBE

## 2022-07-04 ASSESSMENT — PAIN DESCRIPTION - ORIENTATION: ORIENTATION: RIGHT

## 2022-07-04 ASSESSMENT — PAIN SCALES - GENERAL: PAINLEVEL_OUTOF10: 4

## 2022-07-04 ASSESSMENT — PAIN DESCRIPTION - PAIN TYPE: TYPE: ACUTE PAIN

## 2022-07-04 ASSESSMENT — PAIN DESCRIPTION - FREQUENCY: FREQUENCY: CONTINUOUS

## 2022-07-04 ASSESSMENT — PAIN - FUNCTIONAL ASSESSMENT: PAIN_FUNCTIONAL_ASSESSMENT: 0-10

## 2022-07-04 ASSESSMENT — PAIN DESCRIPTION - LOCATION: LOCATION: SHOULDER

## 2022-07-04 NOTE — ED TRIAGE NOTES
Pt presents to ED from home w/daughter present with c/o fall. Per pt's daughter, pt fell out of her lawn chair 1 hour PTA, striking her right shoulder. Pt's daughter reports that pt was difficult to arouse when she got to her, however pt states that she doesn't think she had LOC. - blood thinners. Pt denies head and neck pain. Abrasion noted to right shoulder on assessment; no deformities noted, cap refill < 2 seconds, right radial pulse palp. Upon assessment, pt is A/Ox2, skin p/w/d, respirations even and unlabored, msp's intact. Pt denies chest pain, SOB, n/v/d, fever, and chills.

## 2022-07-04 NOTE — ED PROVIDER NOTES
[address] [facility] ED  eMERGENCY dEPARTMENT eNCOUnter      Pt Name: Naun Goins  MRN: [id number]  [place] 1940  Date of evaluation: 7/4/2022  Provider: Todd Sanchez PA-C    CHIEF COMPLAINT       Chief Complaint   Patient presents with    Fall     PTA pt fell out of her lawn chair, striking her right shoulder; possible LOC, - blood thinners         HISTORY OF PRESENT ILLNESS   (Location/Symptom, Timing/Onset,Context/Setting, Quality, Duration, Modifying Factors, Severity)  Note limiting factors. Naun Goins is a 79 y.o. female who presents to the emergency department with complaint of head pain, neck pain, right shoulder pain, secondary to a fall which occurred at home. Per daughter patient was sitting in the chair in a garage, daughter was sweeping, patient got up to try to assist her with sweeping, and the next thing she knew patient had fallen onto the ground, she is not sure what made her fall, she landed on her right shoulder, daughter states very brief period of loss of consciousness 5 to 10 seconds, that she was alert and oriented, and back to her normal self. Patient is able to but stand and ambulate since the incident occurred. She rates her current pain at this time is a 4 out of 10, she has very poor historian secondary to dementia. Past medical history significant for CVA, dementia. HPI    NursingNotes were reviewed. REVIEW OF SYSTEMS    (2-9 systems for level 4, 10 or more for level 5)     Review of Systems   Constitutional: Negative for activity change and appetite change. HENT: Negative for congestion, ear discharge, ear pain, nosebleeds, rhinorrhea and sore throat. Head pain   Eyes: Negative for discharge. Respiratory: Negative for shortness of breath. Cardiovascular: Negative for chest pain, palpitations and leg swelling. Gastrointestinal: Negative for abdominal distention, abdominal pain and constipation.    Genitourinary: Negative for difficulty urinating and dysuria. Musculoskeletal: Negative for arthralgias. Right shoulder pain   Skin: Negative for color change. Neurological: Negative for dizziness, syncope, numbness and headaches. Psychiatric/Behavioral: Negative for agitation and confusion. Except as noted above the remainder of the review of systems was reviewed and negative. 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 LIDOCAINE (XYLOCAINE) 5 % OINTMENT    Apply topically as needed. ALLERGIES     Nuts [peanut-containing drug products] and Shellfish-derived products    FAMILY HISTORY     History reviewed. No pertinent family history. SOCIAL HISTORY       Social History     Socioeconomic History    Marital status:      Spouse name: None    Number of children: None    Years of education: None    Highest education level: None   Occupational History    None   Tobacco Use    Smoking status: Former Smoker    Smokeless tobacco: Never Used   Vaping Use    Vaping Use: Never used   Substance and Sexual Activity    Alcohol use: Not Currently    Drug use: Never    Sexual activity: None   Other Topics Concern    None   Social History Narrative    None     Social Determinants of Health     Financial Resource Strain:     Difficulty of Paying Living Expenses: Not on file   Food Insecurity:     Worried About Running Out of Food in the Last Year: Not on file    Milena of Food in the Last Year: Not on file   Transportation Needs:     Lack of Transportation (Medical):  Not on file    Lack of Transportation (Non-Medical): Not on file   Physical Activity:     Days of Exercise per Week: Not on file    Minutes of Exercise per Session: Not on file   Stress:     Feeling of Stress : Not on file   Social Connections:     Frequency of Communication with Friends and Family: Not on file    Frequency of Social Gatherings with Friends and Family: Not on file    Attends Bahai Services: Not on file    Active Member of [address] [place] [address] or Organizations: Not on file    Attends Club or Organization Meetings: Not on file    Marital Status: Not on file   Intimate Partner Violence:     Fear of Current or Ex-Partner: Not on file    Emotionally Abused: Not on file    Physically Abused: Not on file    Sexually Abused: Not on file   Housing Stability:     Unable to Pay for Housing in the Last Year: Not on file    Number of [place] in the Last Year: Not on file    Unstable Housing in the Last Year: Not on file       SCREENINGS    Ida Coma Scale  Eye Opening: Spontaneous  Best Verbal Response: Confused  Best Motor Response: Obeys commands  Ida Coma Scale Score: 14 @FLOW([id number])@      PHYSICAL EXAM    (up to 7 for level 4, 8 or more for level 5)     ED Triage Vitals [07/04/22 1458]   BP Temp Temp Source Heart Rate Resp SpO2 Height Weight   127/80 97.2 °F (36.2 °C) Temporal 80 20 98 % 5' 4\" (1.626 m) 130 lb (59 kg)       Physical Exam  Vitals and nursing note reviewed. Constitutional:       General: She is not in acute distress. Appearance: She is well-developed. She is not ill-appearing, toxic-appearing or diaphoretic. HENT:      Head: Normocephalic. Comments: No signs of traumatic injury, no cut scrapes abrasions, no depressions, no deformity, no pain on palpation,     Nose: Nose normal. No congestion. Mouth/Throat:      Mouth: Mucous membranes are moist.      Pharynx: No oropharyngeal exudate or posterior oropharyngeal erythema.       Comments: No signs of intraoral injury, no dental fractures  Eyes: Extraocular Movements: Extraocular movements intact. Conjunctiva/sclera: Conjunctivae normal.      Pupils: Pupils are equal, round, and reactive to light. Comments: Pupils equal round reactive to light, no nystagmus. Neck:      Vascular: No JVD. Trachea: No tracheal deviation. Comments: Neck is supple, there is no midline tenderness, no step-offs, no crepitus, no instability  Cardiovascular:      Rate and Rhythm: Normal rate. Pulses: Normal pulses. Heart sounds: Normal heart sounds. No murmur heard. No friction rub. No gallop. Pulmonary:      Effort: Pulmonary effort is normal. No tachypnea, accessory muscle usage, respiratory distress or retractions. Breath sounds: Normal breath sounds. No stridor. No wheezing, rhonchi or rales. Comments: Lung sounds are clear in all fields, no wheeze rales or rhonchi, no excess muscle use, retractions, room air saturations are 98%. No pain on palpation chest wall, no paradoxical movement or flail segments. Chest:      Chest wall: No tenderness. Abdominal:      General: Abdomen is flat. Bowel sounds are normal. There is no distension or abdominal bruit. Palpations: Abdomen is soft. There is no shifting dullness, fluid wave, hepatomegaly, splenomegaly, mass or pulsatile mass. Tenderness: There is no abdominal tenderness. There is no right CVA tenderness, left CVA tenderness, guarding or rebound. Negative signs include Holland's sign, Rovsing's sign and McBurney's sign. Comments: Abdomen soft nondistended nontender no guarding mass rebound, no CVA tenderness. Musculoskeletal:         General: No deformity. Arms:       Cervical back: Normal range of motion and neck supple. No rigidity.       Comments: Patient is able to stand and ambulate with upright steady gait, no limp ataxia or foot drop, patient does have some mild pain on palpation to thoracic pain, lumbar spine, there is no crepitus or instability, pelvis is stable there is no instability, no shortening or rotation of bilateral lower extremities, no femur pain, no knee pain, no tib-fib, no foot or ankle pain bilaterally, patient has mild tenderness on palpation across the anterior right shoulder, there is an abrasion, no obvious deformity in that area mild tenderness to right humerus, right elbow, no pain on palpation to right ulna radius, wrist hand or finger. No pain across the left shoulder, left humerus, left elbow, left wrist hand or fingers, patient able to move upper and lower extremities well with minimal increase in pain, upper and lower extremities are neurovascular intact. Skin:     General: Skin is warm and dry. Capillary Refill: Capillary refill takes less than 2 seconds. Coloration: Skin is not jaundiced. Neurological:      General: No focal deficit present. Mental Status: She is alert and oriented to person, place, and time. Mental status is at baseline. Cranial Nerves: No cranial nerve deficit. Sensory: No sensory deficit. Motor: No weakness.       Coordination: Coordination normal.   Psychiatric:         Mood and Affect: Mood normal.         DIAGNOSTIC RESULTS     EKG: All EKG's are interpreted by the Emergency Department Physician who either signs or Co-signsthis chart in the absence of a cardiologist.    EKG shows a sinus rhythm at 70 bpm there is T wave inversions in leads III and aVF and V6, occasional PAC, no ventricular ectopy  ms    RADIOLOGY:   Non-plain filmimages such as CT, Ultrasound and MRI are read by the radiologist. Plain radiographic images are visualized and preliminarily interpreted by the emergency physician with the below findings:    X-ray right elbow shows no acute fracture or subluxation    X-ray right humerus no acute fracture or subluxation    X-ray right shoulder no acute fracture or subluxation    Interpretation per the Radiologist below, if available at the time ofthis note:    CT Head WO Contrast   Final Result      No acute intracranial process. All CT scans at this facility use dose modulation, iterative reconstruction, and/or weight based dosing when appropriate to reduce radiation dose to as low as reasonably achievable. CT CERVICAL SPINE WO CONTRAST   Final Result      No acute fracture or malalignment. All CT scans at this facility use dose modulation, iterative reconstruction, and/or weight based dosing when appropriate to reduce radiation dose to as low as reasonably achievable. CT THORACIC SPINE WO CONTRAST   Final Result      No acute fracture or malalignment of the thoracolumbar spine. All CT scans at this facility use dose modulation, iterative reconstruction, and/or weight based dosing when appropriate to reduce radiation dose to as low as reasonably achievable. CT LUMBAR SPINE WO CONTRAST   Final Result      No acute fracture or malalignment of the thoracolumbar spine. All CT scans at this facility use dose modulation, iterative reconstruction, and/or weight based dosing when appropriate to reduce radiation dose to as low as reasonably achievable. XR SHOULDER RIGHT (MIN 2 VIEWS)    (Results Pending)   XR HUMERUS RIGHT (MIN 2 VIEWS)    (Results Pending)   XR ELBOW RIGHT (MIN 3 VIEWS)    (Results Pending)         ED BEDSIDE ULTRASOUND:   Performed by ED Physician - none    LABS:  Labs Reviewed   COMPREHENSIVE METABOLIC PANEL - Abnormal; Notable for the following components:       Result Value    CREATININE 1.08 (*)     GFR Non- 48.5 (*)     GFR  58.7 (*)     All other components within normal limits   CBC WITH AUTO DIFFERENTIAL - Abnormal; Notable for the following components:    MCHC 32.5 (*)     All other components within normal limits   PROTIME-INR   APTT   ETHANOL   URINE DRUG SCREEN       All other labs were within normal range or not returned as of this dictation.     EMERGENCY DEPARTMENT COURSE and DIFFERENTIAL DIAGNOSIS/MDM:   Vitals:    Vitals:    07/04/22 1458 07/04/22 1505 07/04/22 1600 07/04/22 1653   BP: 127/80 127/80 (!) 178/81 (!) 196/79   Pulse: 80 80 71 70   Resp: 20 20 21 18   Temp: 97.2 °F (36.2 °C) 97.2 °F (36.2 °C)     TempSrc: Temporal Temporal     SpO2: 98% 98% 97% 100%   Weight: 130 lb (59 kg)      Height: 5' 4\" (1.626 m)           MDM  Number of Diagnoses or Management Options  Abrasion of right shoulder, initial encounter  Closed head injury, initial encounter  Contusion of right shoulder, initial encounter  Fall, initial encounter  Diagnosis management comments: Patient present to ED with complaint of head, neck, right shoulder pain secondary to fall at home while in the garage, she appears no acute distress on arrival, she is alert and orient, moving all extremities well, CT scan of the head, cervical spine, thoracic spine, lumbar spine shows no acute fracture or malalignment. X-ray of the right shoulder, right humerus, right elbow showed no acute fractures. Patient diagnosed with closed head injury, shoulder contusion, abrasion, she was advised to call to schedule follow-up appointment with her primary doctor, should she have any worsening or change symptoms, she advised to return to the ED. CRITICAL CARE TIME   Total Critical Care time was 0 minutes, excluding separately reportableprocedures. There was a high probability of clinicallysignificant/life threatening deterioration in the patient's condition which required my urgent intervention. CONSULTS:  None    PROCEDURES:  Unless otherwise noted below, none     Procedures    FINAL IMPRESSION      1. Closed head injury, initial encounter    2. Contusion of right shoulder, initial encounter    3. Abrasion of right shoulder, initial encounter    4.  Fall, initial encounter          DISPOSITION/PLAN   DISPOSITION Decision To Discharge 07/04/2022 05:11:55 PM      PATIENT REFERRED TO:  RONAL Cuellar - CNP  [address] [facility] [id number]  [phone]    In 3 days      [address] [facility], [facility] [address] [place] [address]  [address] [id number]  In 1 week  If symptoms worsen      DISCHARGE MEDICATIONS:  New Prescriptions    No medications on file          (Please note that portions of this note were completed with a voice recognition program.  Efforts were made to edit the dictations but occasionally words are mis-transcribed.)    Dejah Chung PA-C (electronically signed)  Attending Emergency Physician        Dejah Chung PA-C  07/04/22 4020

## 2022-07-05 LAB
EKG ATRIAL RATE: 70 BPM
EKG P AXIS: 40 DEGREES
EKG P-R INTERVAL: 126 MS
EKG Q-T INTERVAL: 406 MS
EKG QRS DURATION: 88 MS
EKG QTC CALCULATION (BAZETT): 438 MS
EKG R AXIS: 32 DEGREES
EKG T AXIS: -11 DEGREES
EKG VENTRICULAR RATE: 70 BPM

## 2022-10-28 ENCOUNTER — HOSPITAL ENCOUNTER (EMERGENCY)
Age: 82
Discharge: HOME | End: 2022-10-28
Attending: EMERGENCY MEDICINE
Payer: MEDICARE

## 2022-10-28 ENCOUNTER — APPOINTMENT (OUTPATIENT)
Dept: CT IMAGING | Age: 82
End: 2022-10-28
Payer: MEDICARE

## 2022-10-28 ENCOUNTER — APPOINTMENT (OUTPATIENT)
Dept: GENERAL RADIOLOGY | Age: 82
End: 2022-10-28
Payer: MEDICARE

## 2022-10-28 VITALS
OXYGEN SATURATION: 98 % | HEIGHT: 64 IN | BODY MASS INDEX: 19.63 KG/M2 | DIASTOLIC BLOOD PRESSURE: 58 MMHG | TEMPERATURE: 97.4 F | SYSTOLIC BLOOD PRESSURE: 118 MMHG | WEIGHT: 115 LBS | HEART RATE: 73 BPM | RESPIRATION RATE: 24 BRPM

## 2022-10-28 DIAGNOSIS — I73.9 PAD (PERIPHERAL ARTERY DISEASE) (HCC): ICD-10-CM

## 2022-10-28 DIAGNOSIS — I95.1 ORTHOSTATIC HYPOTENSION: Primary | ICD-10-CM

## 2022-10-28 LAB
ALBUMIN SERPL-MCNC: 3.8 G/DL (ref 3.5–4.6)
ALP BLD-CCNC: 119 U/L (ref 40–130)
ALT SERPL-CCNC: <5 U/L (ref 0–33)
ANION GAP SERPL CALCULATED.3IONS-SCNC: 11 MEQ/L (ref 9–15)
APTT: 29 SEC (ref 24.4–36.8)
AST SERPL-CCNC: 10 U/L (ref 0–35)
BASOPHILS ABSOLUTE: 0.1 K/UL (ref 0–0.2)
BASOPHILS RELATIVE PERCENT: 0.9 %
BILIRUB SERPL-MCNC: 0.3 MG/DL (ref 0.2–0.7)
BUN BLDV-MCNC: 9 MG/DL (ref 8–23)
CALCIUM SERPL-MCNC: 8.7 MG/DL (ref 8.5–9.9)
CHLORIDE BLD-SCNC: 99 MEQ/L (ref 95–107)
CHP ED QC CHECK: YES
CO2: 32 MEQ/L (ref 20–31)
CREAT SERPL-MCNC: 0.9 MG/DL (ref 0.5–0.9)
EOSINOPHILS ABSOLUTE: 0.6 K/UL (ref 0–0.7)
EOSINOPHILS RELATIVE PERCENT: 6 %
GFR SERPL CREATININE-BSD FRML MDRD: 56 ML/MIN/{1.73_M2}
GFR SERPL CREATININE-BSD FRML MDRD: >60 ML/MIN/{1.73_M2}
GLOBULIN: 3.5 G/DL (ref 2.3–3.5)
GLUCOSE BLD-MCNC: 75 MG/DL (ref 70–99)
GLUCOSE BLD-MCNC: 80 MG/DL
GLUCOSE BLD-MCNC: 80 MG/DL (ref 70–99)
HCT VFR BLD CALC: 40.5 % (ref 37–47)
HEMOGLOBIN: 13.5 G/DL (ref 12–16)
INR BLD: 1.1
LYMPHOCYTES ABSOLUTE: 1.4 K/UL (ref 1–4.8)
LYMPHOCYTES RELATIVE PERCENT: 14 %
MAGNESIUM: 1.8 MG/DL (ref 1.7–2.4)
MCH RBC QN AUTO: 29.7 PG (ref 27–31.3)
MCHC RBC AUTO-ENTMCNC: 33.3 % (ref 33–37)
MCV RBC AUTO: 89.2 FL (ref 79.4–94.8)
MONOCYTES ABSOLUTE: 0.8 K/UL (ref 0.2–0.8)
MONOCYTES RELATIVE PERCENT: 7.6 %
NEUTROPHILS ABSOLUTE: 7.4 K/UL (ref 1.4–6.5)
NEUTROPHILS RELATIVE PERCENT: 71.5 %
PDW BLD-RTO: 14.7 % (ref 11.5–14.5)
PERFORMED ON: ABNORMAL
PERFORMED ON: NORMAL
PLATELET # BLD: 422 K/UL (ref 130–400)
POC CREATININE WHOLE BLOOD: 1
POC CREATININE: 1 MG/DL (ref 0.6–1.2)
POC SAMPLE TYPE: ABNORMAL
POTASSIUM REFLEX MAGNESIUM: 3.3 MEQ/L (ref 3.4–4.9)
PRO-BNP: 1507 PG/ML
PROTHROMBIN TIME: 14 SEC (ref 12.3–14.9)
RBC # BLD: 4.54 M/UL (ref 4.2–5.4)
SODIUM BLD-SCNC: 142 MEQ/L (ref 135–144)
TOTAL PROTEIN: 7.3 G/DL (ref 6.3–8)
TROPONIN: <0.01 NG/ML (ref 0–0.01)
WBC # BLD: 10.3 K/UL (ref 4.8–10.8)

## 2022-10-28 PROCEDURE — 85730 THROMBOPLASTIN TIME PARTIAL: CPT

## 2022-10-28 PROCEDURE — 99285 EMERGENCY DEPT VISIT HI MDM: CPT

## 2022-10-28 PROCEDURE — 85025 COMPLETE CBC W/AUTO DIFF WBC: CPT

## 2022-10-28 PROCEDURE — 6360000004 HC RX CONTRAST MEDICATION: Performed by: EMERGENCY MEDICINE

## 2022-10-28 PROCEDURE — 80053 COMPREHEN METABOLIC PANEL: CPT

## 2022-10-28 PROCEDURE — 96374 THER/PROPH/DIAG INJ IV PUSH: CPT

## 2022-10-28 PROCEDURE — 36415 COLL VENOUS BLD VENIPUNCTURE: CPT

## 2022-10-28 PROCEDURE — 93005 ELECTROCARDIOGRAM TRACING: CPT | Performed by: EMERGENCY MEDICINE

## 2022-10-28 PROCEDURE — 70498 CT ANGIOGRAPHY NECK: CPT

## 2022-10-28 PROCEDURE — 85610 PROTHROMBIN TIME: CPT

## 2022-10-28 PROCEDURE — 6360000002 HC RX W HCPCS: Performed by: EMERGENCY MEDICINE

## 2022-10-28 PROCEDURE — 70496 CT ANGIOGRAPHY HEAD: CPT

## 2022-10-28 PROCEDURE — 96375 TX/PRO/DX INJ NEW DRUG ADDON: CPT

## 2022-10-28 PROCEDURE — 81003 URINALYSIS AUTO W/O SCOPE: CPT

## 2022-10-28 PROCEDURE — 2580000003 HC RX 258: Performed by: EMERGENCY MEDICINE

## 2022-10-28 PROCEDURE — 83880 ASSAY OF NATRIURETIC PEPTIDE: CPT

## 2022-10-28 PROCEDURE — 84484 ASSAY OF TROPONIN QUANT: CPT

## 2022-10-28 PROCEDURE — 71045 X-RAY EXAM CHEST 1 VIEW: CPT

## 2022-10-28 PROCEDURE — 83735 ASSAY OF MAGNESIUM: CPT

## 2022-10-28 RX ORDER — 0.9 % SODIUM CHLORIDE 0.9 %
500 INTRAVENOUS SOLUTION INTRAVENOUS ONCE
Status: DISCONTINUED | OUTPATIENT
Start: 2022-10-28 | End: 2022-10-28 | Stop reason: HOSPADM

## 2022-10-28 RX ORDER — DIPHENHYDRAMINE HYDROCHLORIDE 50 MG/ML
50 INJECTION INTRAMUSCULAR; INTRAVENOUS ONCE
Status: COMPLETED | OUTPATIENT
Start: 2022-10-28 | End: 2022-10-28

## 2022-10-28 RX ORDER — 0.9 % SODIUM CHLORIDE 0.9 %
1000 INTRAVENOUS SOLUTION INTRAVENOUS ONCE
Status: COMPLETED | OUTPATIENT
Start: 2022-10-28 | End: 2022-10-28

## 2022-10-28 RX ORDER — METHYLPREDNISOLONE SODIUM SUCCINATE 125 MG/2ML
125 INJECTION, POWDER, LYOPHILIZED, FOR SOLUTION INTRAMUSCULAR; INTRAVENOUS ONCE
Status: COMPLETED | OUTPATIENT
Start: 2022-10-28 | End: 2022-10-28

## 2022-10-28 RX ORDER — CEPHALEXIN 250 MG/1
250 CAPSULE ORAL 4 TIMES DAILY
Qty: 40 CAPSULE | Refills: 0 | Status: SHIPPED | OUTPATIENT
Start: 2022-10-28 | End: 2022-11-07

## 2022-10-28 RX ADMIN — DIPHENHYDRAMINE HYDROCHLORIDE 50 MG: 50 INJECTION, SOLUTION INTRAMUSCULAR; INTRAVENOUS at 14:19

## 2022-10-28 RX ADMIN — IOPAMIDOL 75 ML: 612 INJECTION, SOLUTION INTRAVENOUS at 14:54

## 2022-10-28 RX ADMIN — METHYLPREDNISOLONE SODIUM SUCCINATE 125 MG: 125 INJECTION, POWDER, FOR SOLUTION INTRAMUSCULAR; INTRAVENOUS at 14:18

## 2022-10-28 RX ADMIN — SODIUM CHLORIDE 1000 ML: 9 INJECTION, SOLUTION INTRAVENOUS at 14:17

## 2022-10-28 ASSESSMENT — LIFESTYLE VARIABLES
HOW MANY STANDARD DRINKS CONTAINING ALCOHOL DO YOU HAVE ON A TYPICAL DAY: PATIENT DOES NOT DRINK
HOW OFTEN DO YOU HAVE A DRINK CONTAINING ALCOHOL: NEVER

## 2022-10-28 ASSESSMENT — ENCOUNTER SYMPTOMS
SHORTNESS OF BREATH: 0
ABDOMINAL PAIN: 0
VOMITING: 0

## 2022-10-28 ASSESSMENT — PAIN SCALES - GENERAL: PAINLEVEL_OUTOF10: 0

## 2022-10-28 ASSESSMENT — PAIN - FUNCTIONAL ASSESSMENT
PAIN_FUNCTIONAL_ASSESSMENT: NONE - DENIES PAIN
PAIN_FUNCTIONAL_ASSESSMENT: NONE - DENIES PAIN

## 2022-10-28 NOTE — ED PROVIDER NOTES
[address] [facility]  EMERGENCY DEPARTMENT ENCOUNTER      Pt Name: Sonido Pascual  MRN: [id number]  [place] 1940  Date of evaluation: 10/28/2022  Provider: Rosina Esparza [facility]       Chief Complaint   Patient presents with    Dizziness     Pt passed out while she was eating (just put her head down at the table)          HISTORY OF PRESENT ILLNESS   (Location/Symptom, Timing/Onset, Context/Setting, Quality, Duration, Modifying Factors, Severity)  Note limiting factors. Sonido Pascual is a 79 y.o. female who presents to the emergency department for evaluation of syncope. History of CVA, dementia. The patient is here with her daughter. The daughter says that she has dementia that waxes and wanes. Currently patient is alert to person, place, year. Daughter reports that she was eating with her when the patient seemed to slouch forward and was unconscious for less than 1 minute, was confused for a couple of minutes after this. Currently back to baseline. Patient does not report remembering this event. She says she was fine this morning. Does not endorse headache or vision change, room spinning sensation, dizziness, neck pain, chest pain or difficulty breathing, abdominal pain, back pain, numbness or weakness. Currently back to baseline. No further episodes since. Denies traumatic injury  HPI    Nursing Notes were reviewed. REVIEW OF SYSTEMS    (2-9 systems for level 4, 10 or more for level 5)     Review of Systems   Constitutional:  Negative for fever. Respiratory:  Negative for shortness of breath. Cardiovascular:  Negative for chest pain. Gastrointestinal:  Negative for abdominal pain and vomiting. Genitourinary:  Negative for flank pain. Musculoskeletal:  Negative for neck pain. Neurological:  Negative for facial asymmetry, weakness and numbness. Unresponsive episode   All other systems reviewed and are negative.     Except as noted above the remainder of the review of systems was reviewed and negative. PAST MEDICAL HISTORY     Past Medical History:   Diagnosis Date    Cerebral artery occlusion with cerebral infarction ([facility] [address]     Dementia ([facility] [address]          SURGICAL HISTORY       Past Surgical History:   Procedure Laterality Date    ANKLE FRACTURE SURGERY Left     CHOLECYSTECTOMY      TUBAL LIGATION           CURRENT MEDICATIONS       Previous Medications    BETAMETHASONE DIPROPIONATE (DIPROLENE) 0.05 % OINTMENT    Apply topically 2 times daily. LIDOCAINE (XYLOCAINE) 5 % OINTMENT    Apply topically as needed. ALLERGIES     Nuts [peanut-containing drug products] and Shellfish-derived products    FAMILY HISTORY     No family history on file. SOCIAL HISTORY       Social History     Socioeconomic History    Marital status:       Spouse name: None    Number of children: None    Years of education: None    Highest education level: None   Tobacco Use    Smoking status: Former    Smokeless tobacco: Never   Vaping Use    Vaping Use: Never used   Substance and Sexual Activity    Alcohol use: Not Currently    Drug use: Never       SCREENINGS         [place]da Coma Scale  Eye Opening: Spontaneous  Best Verbal Response: Oriented  Best Motor Response: Obeys commands  Ida Coma Scale Score: 15                     CIWA Assessment  BP: (!) 148/89  Heart Rate: 75  Nausea and Vomiting: no nausea and no vomiting  Tactile Disturbances: none  Tremor: no tremor  Auditory Disturbances: not present  Paroxysmal Sweats: no sweat visible  Visual Disturbances: not present  Anxiety: no anxiety, at ease  Headache, Fullness in Head: none present  Agitation: normal activity  Orientation and Clouding of Sensorium: oriented and can do serial additions  CIWA-Ar Total: 0                 PHYSICAL EXAM    (up to 7 for level 4, 8 or more for level 5)     ED Triage Vitals [10/28/22 1312]   BP Temp Temp Source Heart Rate Resp SpO2 Height Weight   114/73 97.4 °F (36.3 °C) Temporal 77 17 97 % 5' 4\" (1.626 m) --       Physical Exam  Vitals reviewed. Constitutional:       General: She is not in acute distress. Appearance: She is not toxic-appearing. Comments: Airway intact, breath sounds equal, GCS 15, no upper extremity pronator drift or limb ataxia on finger-to-nose. No lateralizing signs. No chest wall deformity. No midline vertebral tenderness or step-off. Pelvis stable. HENT:      Head: Normocephalic and atraumatic. Nose: Nose normal.      Mouth/Throat:      Mouth: Mucous membranes are moist.   Eyes:      General: No scleral icterus. Extraocular Movements: Extraocular movements intact. Pupils: Pupils are equal, round, and reactive to light. Comments: No facial droop   Cardiovascular:      Rate and Rhythm: Normal rate and regular rhythm. Pulses: Normal pulses. Pulmonary:      Effort: Pulmonary effort is normal.      Breath sounds: Normal breath sounds. Abdominal:      General: There is no distension. Tenderness: There is no abdominal tenderness. There is no guarding. Musculoskeletal:      Cervical back: Normal range of motion. No rigidity or tenderness. Right lower leg: No edema. Left lower leg: No edema. Skin:     Capillary Refill: Capillary refill takes less than 2 seconds. Findings: No rash. Neurological:      General: No focal deficit present. Mental Status: She is alert and oriented to person, place, and time. Cranial Nerves: No cranial nerve deficit. Sensory: No sensory deficit. Motor: No weakness.    Psychiatric:         Mood and Affect: Mood normal.       DIAGNOSTIC RESULTS     EKG: All EKG's are interpreted by the Emergency Department Physician who either signs or Co-signs this chart in the absence of a cardiologist.    Normal sinus rhythm, rate 85, normal axis, , LVH, no STEMI    RADIOLOGY:   Non-plain film images such as CT, Ultrasound and MRI are read by the radiologist. Plain radiographic images are visualized and preliminarily interpreted by the emergency physician with the below findings:      Interpretation per the Radiologist below, if available at the time of this note:    CTA HEAD W WO CONTRAST   Final Result   1. No acute intracranial process identified. 2. No large vessel occlusion identified within the brain. 3. Severe short segment stenosis of the P2 segment of the left posterior   cerebral artery. 4. No significant arterial stenosis identified within the neck. CTA NECK W WO CONTRAST   Final Result   1. No acute intracranial process identified. 2. No large vessel occlusion identified within the brain. 3. Severe short segment stenosis of the P2 segment of the left posterior   cerebral artery. 4. No significant arterial stenosis identified within the neck. XR CHEST PORTABLE   Final Result   No acute process.                ED BEDSIDE ULTRASOUND:   Performed by ED Physician - none    LABS:  Labs Reviewed   CBC WITH AUTO DIFFERENTIAL - Abnormal; Notable for the following components:       Result Value    RDW 14.7 (*)     Platelets 876 (*)     Neutrophils Absolute 7.4 (*)     All other components within normal limits   COMPREHENSIVE METABOLIC PANEL W/ REFLEX TO MG FOR LOW K - Abnormal; Notable for the following components:    Potassium reflex Magnesium 3.3 (*)     CO2 32 (*)     All other components within normal limits   URINALYSIS WITH REFLEX TO CULTURE - Abnormal; Notable for the following components:    Leukocyte Esterase, Urine MODERATE (*)     All other components within normal limits   POCT VENOUS - Abnormal; Notable for the following components:    Est, Glom Filt Rate 56 (*)     All other components within normal limits   POCT GLUCOSE - Normal   POCT CREATININE - Normal   TROPONIN   BRAIN NATRIURETIC PEPTIDE   PROTIME-INR   APTT   MAGNESIUM   MICROSCOPIC URINALYSIS   POCT GLUCOSE       All other labs were within normal range or not returned as of this dictation. EMERGENCY DEPARTMENT COURSE and DIFFERENTIAL DIAGNOSIS/MDM:   Vitals:    Vitals:    10/28/22 1400 10/28/22 1519 10/28/22 1600 10/28/22 1630   BP: 121/72  (!) 114/91 (!) 148/89   Pulse: 70  76 75   Resp: 18 22 15 16   Temp:       TempSrc:       SpO2: 95% 99% 100% 98%   Weight:       Height:             Nonischemic EKG, troponin WNL, not suspicious for ACS  No leukocytosis, no significant electrolyte derangement. BNP elevated however chest x-ray without significant findings. Saturating well on room air, denies chest pain or shortness of breath. Not consistent with pulmonary embolism. MDM  Patient presents to the emergency department after an unresponsive episode  Clinical exam not highly suspicious for CVA  EKG and labs reassuring  Orthostatic hypotension responsive to fluids, the daughter states that she has not been eating much lately  The patient feels comfortable with discharge      REASSESSMENT      Orthostatic improvement after fluids  Agreeable to return precautions    CONSULTS:  None    PROCEDURES:  Unless otherwise noted below, none     Procedures      FINAL IMPRESSION      1. Orthostatic hypotension    2. PAD (peripheral artery disease) [facility])          DISPOSITION/PLAN   DISPOSITION Decision To Discharge 10/28/2022 05:24:05 PM      PATIENT REFERRED TO:  [facility]  [address] [facility]  [phone]          DISCHARGE MEDICATIONS:  New Prescriptions    CEPHALEXIN (KEFLEX) 250 MG CAPSULE    Take 1 capsule by mouth 4 times daily for 10 days     Controlled Substances Monitoring:     No flowsheet data found.     (Please note that portions of this note were completed with a voice recognition program.  Efforts were made to edit the dictations but occasionally words are mis-transcribed.)    Casey Barry MD (electronically signed)  Attending Emergency Physician            Casey Barry MD  10/28/22 7941

## 2022-10-29 LAB
BACTERIA: NEGATIVE /HPF
BILIRUBIN URINE: NEGATIVE
BLOOD, URINE: NEGATIVE
CLARITY: CLEAR
COLOR: YELLOW
EPITHELIAL CELLS, UA: NORMAL /HPF (ref 0–5)
GLUCOSE URINE: NEGATIVE MG/DL
HYALINE CASTS: NORMAL /HPF (ref 0–5)
KETONES, URINE: NEGATIVE MG/DL
LEUKOCYTE ESTERASE, URINE: ABNORMAL
NITRITE, URINE: NEGATIVE
PH UA: 7 (ref 5–9)
PROTEIN UA: NEGATIVE MG/DL
RBC UA: NORMAL /HPF (ref 0–5)
SPECIFIC GRAVITY UA: 1.01 (ref 1–1.03)
URINE REFLEX TO CULTURE: ABNORMAL
UROBILINOGEN, URINE: 0.2 E.U./DL
WBC UA: NORMAL /HPF (ref 0–5)

## 2022-10-31 LAB
EKG ATRIAL RATE: 85 BPM
EKG P AXIS: 54 DEGREES
EKG P-R INTERVAL: 134 MS
EKG Q-T INTERVAL: 392 MS
EKG QRS DURATION: 80 MS
EKG QTC CALCULATION (BAZETT): 466 MS
EKG R AXIS: 41 DEGREES
EKG T AXIS: 52 DEGREES
EKG VENTRICULAR RATE: 85 BPM

## 2022-10-31 PROCEDURE — 93010 ELECTROCARDIOGRAM REPORT: CPT | Performed by: INTERNAL MEDICINE

## 2023-01-05 ENCOUNTER — HOSPITAL ENCOUNTER (EMERGENCY)
Age: 83
Discharge: LEFT BEFORE DISCHARGE | End: 2023-01-05

## 2023-01-05 VITALS
HEART RATE: 86 BPM | SYSTOLIC BLOOD PRESSURE: 121 MMHG | DIASTOLIC BLOOD PRESSURE: 72 MMHG | WEIGHT: 114 LBS | TEMPERATURE: 97.9 F | BODY MASS INDEX: 19.57 KG/M2 | RESPIRATION RATE: 18 BRPM | OXYGEN SATURATION: 96 %

## 2023-01-05 ASSESSMENT — PAIN - FUNCTIONAL ASSESSMENT: PAIN_FUNCTIONAL_ASSESSMENT: NONE - DENIES PAIN

## 2023-01-05 NOTE — ED TRIAGE NOTES
Patients daughter states that she has dementia but has been more confused and anxious then normal. Was diagnosed with UTI yesterday and is taking ABX. Resp even and unlabored. She is alert and oriented to self only, per daughter this is normal for her. VSS.

## 2023-01-08 ENCOUNTER — HOSPITAL ENCOUNTER (OUTPATIENT)
Age: 83
Setting detail: OBSERVATION
Discharge: NURSING, REHAB OR LONG-TERM CARE | End: 2023-01-12
Attending: INTERNAL MEDICINE | Admitting: INTERNAL MEDICINE
Payer: MEDICARE

## 2023-01-08 ENCOUNTER — APPOINTMENT (OUTPATIENT)
Dept: CT IMAGING | Age: 83
End: 2023-01-08
Payer: MEDICARE

## 2023-01-08 DIAGNOSIS — R41.82 ALTERED MENTAL STATUS, UNSPECIFIED ALTERED MENTAL STATUS TYPE: Primary | ICD-10-CM

## 2023-01-08 PROBLEM — G30.9 DEMENTIA DUE TO ALZHEIMER'S DISEASE (HCC): Status: ACTIVE | Noted: 2023-01-08

## 2023-01-08 PROBLEM — F02.80 DEMENTIA DUE TO ALZHEIMER'S DISEASE (HCC): Status: ACTIVE | Noted: 2023-01-08

## 2023-01-08 LAB
ALBUMIN SERPL-MCNC: 2.9 G/DL (ref 3.5–4.6)
ALP BLD-CCNC: 105 U/L (ref 40–130)
ALT SERPL-CCNC: 9 U/L (ref 0–33)
AMPHETAMINE SCREEN, URINE: NORMAL
ANION GAP SERPL CALCULATED.3IONS-SCNC: 8 MEQ/L (ref 9–15)
AST SERPL-CCNC: 14 U/L (ref 0–35)
BACTERIA: NEGATIVE /HPF
BARBITURATE SCREEN URINE: NORMAL
BASOPHILS ABSOLUTE: 0 K/UL (ref 0–0.2)
BASOPHILS RELATIVE PERCENT: 0.3 %
BENZODIAZEPINE SCREEN, URINE: NORMAL
BILIRUB SERPL-MCNC: 0.6 MG/DL (ref 0.2–0.7)
BILIRUBIN URINE: NEGATIVE
BLOOD, URINE: NEGATIVE
BUN BLDV-MCNC: 9 MG/DL (ref 8–23)
CALCIUM SERPL-MCNC: 8.4 MG/DL (ref 8.5–9.9)
CANNABINOID SCREEN URINE: NORMAL
CHLORIDE BLD-SCNC: 99 MEQ/L (ref 95–107)
CLARITY: CLEAR
CO2: 32 MEQ/L (ref 20–31)
COCAINE METABOLITE SCREEN URINE: NORMAL
COLOR: YELLOW
CREAT SERPL-MCNC: 0.67 MG/DL (ref 0.5–0.9)
EOSINOPHILS ABSOLUTE: 0.5 K/UL (ref 0–0.7)
EOSINOPHILS RELATIVE PERCENT: 6.8 %
EPITHELIAL CELLS, UA: NORMAL /HPF (ref 0–5)
ETHANOL PERCENT: NORMAL G/DL
ETHANOL: <10 MG/DL (ref 0–0.08)
FENTANYL SCREEN, URINE: NORMAL
GFR SERPL CREATININE-BSD FRML MDRD: >60 ML/MIN/{1.73_M2}
GLOBULIN: 2.7 G/DL (ref 2.3–3.5)
GLUCOSE BLD-MCNC: 94 MG/DL (ref 70–99)
GLUCOSE URINE: NEGATIVE MG/DL
HCT VFR BLD CALC: 39.3 % (ref 37–47)
HEMOGLOBIN: 13.2 G/DL (ref 12–16)
HYALINE CASTS: NORMAL /HPF (ref 0–5)
KETONES, URINE: NEGATIVE MG/DL
LEUKOCYTE ESTERASE, URINE: ABNORMAL
LYMPHOCYTES ABSOLUTE: 0.9 K/UL (ref 1–4.8)
LYMPHOCYTES RELATIVE PERCENT: 11.8 %
Lab: NORMAL
MCH RBC QN AUTO: 30.4 PG (ref 27–31.3)
MCHC RBC AUTO-ENTMCNC: 33.5 % (ref 33–37)
MCV RBC AUTO: 90.7 FL (ref 79.4–94.8)
METHADONE SCREEN, URINE: NORMAL
MONOCYTES ABSOLUTE: 0.5 K/UL (ref 0.2–0.8)
MONOCYTES RELATIVE PERCENT: 6.8 %
NEUTROPHILS ABSOLUTE: 5.6 K/UL (ref 1.4–6.5)
NEUTROPHILS RELATIVE PERCENT: 74.3 %
NITRITE, URINE: NEGATIVE
OPIATE SCREEN URINE: NORMAL
OXYCODONE URINE: NORMAL
PDW BLD-RTO: 14.9 % (ref 11.5–14.5)
PH UA: 7 (ref 5–9)
PHENCYCLIDINE SCREEN URINE: NORMAL
PLATELET # BLD: 345 K/UL (ref 130–400)
POTASSIUM SERPL-SCNC: 3.1 MEQ/L (ref 3.4–4.9)
PROPOXYPHENE SCREEN: NORMAL
PROTEIN UA: NEGATIVE MG/DL
RBC # BLD: 4.34 M/UL (ref 4.2–5.4)
RBC UA: NORMAL /HPF (ref 0–5)
REASON FOR REJECTION: NORMAL
REJECTED TEST: NORMAL
SARS-COV-2, NAAT: NOT DETECTED
SODIUM BLD-SCNC: 139 MEQ/L (ref 135–144)
SPECIFIC GRAVITY UA: 1.01 (ref 1–1.03)
TOTAL PROTEIN: 5.6 G/DL (ref 6.3–8)
URINE REFLEX TO CULTURE: ABNORMAL
UROBILINOGEN, URINE: 0.2 E.U./DL
WBC # BLD: 7.5 K/UL (ref 4.8–10.8)
WBC UA: NORMAL /HPF (ref 0–5)

## 2023-01-08 PROCEDURE — G0378 HOSPITAL OBSERVATION PER HR: HCPCS

## 2023-01-08 PROCEDURE — 6370000000 HC RX 637 (ALT 250 FOR IP)

## 2023-01-08 PROCEDURE — 36415 COLL VENOUS BLD VENIPUNCTURE: CPT

## 2023-01-08 PROCEDURE — 6370000000 HC RX 637 (ALT 250 FOR IP): Performed by: PHYSICIAN ASSISTANT

## 2023-01-08 PROCEDURE — 99285 EMERGENCY DEPT VISIT HI MDM: CPT

## 2023-01-08 PROCEDURE — 80307 DRUG TEST PRSMV CHEM ANLYZR: CPT

## 2023-01-08 PROCEDURE — 82077 ASSAY SPEC XCP UR&BREATH IA: CPT

## 2023-01-08 PROCEDURE — 93005 ELECTROCARDIOGRAM TRACING: CPT | Performed by: PHYSICIAN ASSISTANT

## 2023-01-08 PROCEDURE — 80053 COMPREHEN METABOLIC PANEL: CPT

## 2023-01-08 PROCEDURE — 87635 SARS-COV-2 COVID-19 AMP PRB: CPT

## 2023-01-08 PROCEDURE — 81001 URINALYSIS AUTO W/SCOPE: CPT

## 2023-01-08 PROCEDURE — 70450 CT HEAD/BRAIN W/O DYE: CPT

## 2023-01-08 PROCEDURE — 85025 COMPLETE CBC W/AUTO DIFF WBC: CPT

## 2023-01-08 RX ORDER — POLYETHYLENE GLYCOL 3350 17 G/17G
17 POWDER, FOR SOLUTION ORAL DAILY PRN
Status: DISCONTINUED | OUTPATIENT
Start: 2023-01-08 | End: 2023-01-12 | Stop reason: HOSPADM

## 2023-01-08 RX ORDER — ENOXAPARIN SODIUM 100 MG/ML
40 INJECTION SUBCUTANEOUS DAILY
Status: DISCONTINUED | OUTPATIENT
Start: 2023-01-09 | End: 2023-01-12 | Stop reason: HOSPADM

## 2023-01-08 RX ORDER — HYDROXYZINE PAMOATE 25 MG/1
25 CAPSULE ORAL 3 TIMES DAILY PRN
Status: DISCONTINUED | OUTPATIENT
Start: 2023-01-08 | End: 2023-01-12 | Stop reason: HOSPADM

## 2023-01-08 RX ORDER — ONDANSETRON 2 MG/ML
4 INJECTION INTRAMUSCULAR; INTRAVENOUS EVERY 6 HOURS PRN
Status: DISCONTINUED | OUTPATIENT
Start: 2023-01-08 | End: 2023-01-12 | Stop reason: HOSPADM

## 2023-01-08 RX ORDER — LANOLIN ALCOHOL/MO/W.PET/CERES
3 CREAM (GRAM) TOPICAL DAILY
COMMUNITY

## 2023-01-08 RX ORDER — M-VIT,TX,IRON,MINS/CALC/FOLIC 27MG-0.4MG
1 TABLET ORAL DAILY
Status: DISCONTINUED | OUTPATIENT
Start: 2023-01-09 | End: 2023-01-12 | Stop reason: HOSPADM

## 2023-01-08 RX ORDER — ACETAMINOPHEN 650 MG/1
650 SUPPOSITORY RECTAL EVERY 6 HOURS PRN
Status: DISCONTINUED | OUTPATIENT
Start: 2023-01-08 | End: 2023-01-12 | Stop reason: HOSPADM

## 2023-01-08 RX ORDER — ACETAMINOPHEN 325 MG/1
650 TABLET ORAL EVERY 6 HOURS PRN
Status: DISCONTINUED | OUTPATIENT
Start: 2023-01-08 | End: 2023-01-12 | Stop reason: HOSPADM

## 2023-01-08 RX ORDER — LANOLIN ALCOHOL/MO/W.PET/CERES
3 CREAM (GRAM) TOPICAL
Status: DISCONTINUED | OUTPATIENT
Start: 2023-01-09 | End: 2023-01-12 | Stop reason: HOSPADM

## 2023-01-08 RX ORDER — SERTRALINE HYDROCHLORIDE 25 MG/1
25 TABLET, FILM COATED ORAL DAILY
Status: DISCONTINUED | OUTPATIENT
Start: 2023-01-09 | End: 2023-01-12 | Stop reason: HOSPADM

## 2023-01-08 RX ORDER — CEPHALEXIN 500 MG/1
500 CAPSULE ORAL 3 TIMES DAILY
Status: ON HOLD | COMMUNITY
End: 2023-01-10 | Stop reason: HOSPADM

## 2023-01-08 RX ORDER — ONDANSETRON 4 MG/1
4 TABLET, ORALLY DISINTEGRATING ORAL EVERY 8 HOURS PRN
Status: DISCONTINUED | OUTPATIENT
Start: 2023-01-08 | End: 2023-01-12 | Stop reason: HOSPADM

## 2023-01-08 RX ORDER — POTASSIUM BICARBONATE 25 MEQ/1
25 TABLET, EFFERVESCENT ORAL ONCE
Status: COMPLETED | OUTPATIENT
Start: 2023-01-08 | End: 2023-01-08

## 2023-01-08 RX ORDER — BIOTIN 10 MG
1 TABLET ORAL DAILY
COMMUNITY

## 2023-01-08 RX ADMIN — POTASSIUM BICARBONATE 25 MEQ: 978 TABLET, EFFERVESCENT ORAL at 20:06

## 2023-01-08 RX ADMIN — HYDROXYZINE PAMOATE 25 MG: 25 CAPSULE ORAL at 23:12

## 2023-01-08 ASSESSMENT — ENCOUNTER SYMPTOMS
EYE DISCHARGE: 0
BACK PAIN: 0
APNEA: 0
ABDOMINAL PAIN: 0
NAUSEA: 0
ABDOMINAL DISTENTION: 0
VOICE CHANGE: 0
VOMITING: 0

## 2023-01-08 ASSESSMENT — LIFESTYLE VARIABLES
HOW MANY STANDARD DRINKS CONTAINING ALCOHOL DO YOU HAVE ON A TYPICAL DAY: PATIENT DOES NOT DRINK
HOW OFTEN DO YOU HAVE A DRINK CONTAINING ALCOHOL: NEVER
HOW OFTEN DO YOU HAVE A DRINK CONTAINING ALCOHOL: NEVER
HOW MANY STANDARD DRINKS CONTAINING ALCOHOL DO YOU HAVE ON A TYPICAL DAY: PATIENT DOES NOT DRINK

## 2023-01-08 ASSESSMENT — PAIN - FUNCTIONAL ASSESSMENT: PAIN_FUNCTIONAL_ASSESSMENT: NONE - DENIES PAIN

## 2023-01-08 NOTE — ED PROVIDER NOTES
[facility] [address]  eMERGENCY dEPARTMENT eNCOUnter      Pt Name: Constantine Swenson  MRN: [id number]  [place] 1940  Date of evaluation: 1/8/2023  Provider: Shanice Lomeli PA-C    CHIEF COMPLAINT       Chief Complaint   Patient presents with    Altered Mental Status     Per daughter patient has had some paranoia and confusion         HISTORY OF PRESENT ILLNESS   (Location/Symptom, Timing/Onset,Context/Setting, Quality, Duration, Modifying Factors, Severity)  Note limiting factors. Constantine Swenson is a 79 y.o. female who presents to the emergency department   patient brought by daughter she is having increasing confusion paranoia unstable emotions family states this is occurred in the past week prior to this patient could be left alone family could come back at lunchtime get her lunch and go back to work without concern. Mother concerned with patient harming herself or coming to harm. They have been trying to get home health or nursing home placement but have been unable to do so at this time patient being treated for UTI since Wednesday with Keflex patient is having difficulty following commands per family symptoms mild to moderate severity nothing improves symptoms nothing worsens symptoms. HPI    NursingNotes were reviewed. REVIEW OF SYSTEMS    (2-9 systems for level 4, 10 or more for level 5)     Review of Systems   Constitutional:  Negative for activity change, appetite change and unexpected weight change. HENT:  Negative for ear discharge, nosebleeds and voice change. Eyes:  Negative for discharge. Respiratory:  Negative for apnea. Cardiovascular:  Negative for chest pain. Gastrointestinal:  Negative for abdominal distention, abdominal pain, nausea and vomiting. Genitourinary:  Negative for dysuria and hematuria. Musculoskeletal:  Negative for back pain and joint swelling. Skin:  Negative for pallor. Neurological:  Positive for headaches. Negative for weakness. Psychiatric/Behavioral:  Positive for confusion. Negative for behavioral problems, self-injury and sleep disturbance. All other systems reviewed and are negative. Except as noted above the remainder of the review of systems was reviewed and negative. PAST MEDICAL HISTORY     Past Medical History:   Diagnosis Date    Cerebral artery occlusion with cerebral infarction ([facility] [address]     Dementia ([facility] [address]          SURGICALHISTORY       Past Surgical History:   Procedure Laterality Date    ANKLE FRACTURE SURGERY Left     CHOLECYSTECTOMY      TUBAL LIGATION           CURRENT MEDICATIONS       Current Discharge Medication List        CONTINUE these medications which have NOT CHANGED    Details   sertraline (ZOLOFT) 50 MG tablet Take 25 mg by mouth daily      Multiple Vitamins-Minerals (MULTIVITAMIN ADULT) CHEW Take 1 tablet by mouth daily      melatonin 3 MG TABS tablet Take 3 mg by mouth daily Over counter      betamethasone dipropionate (DIPROLENE) 0.05 % ointment Apply topically 2 times daily. Qty: 1 Tube, Refills: 1      lidocaine (XYLOCAINE) 5 % ointment Apply topically as needed. Qty: 30 g, Refills: 0                  Nuts [peanut-containing drug products] and Shellfish-derived products    FAMILY HISTORY     History reviewed. No pertinent family history. SOCIAL HISTORY       Social History     Socioeconomic History    Marital status:       Spouse name: None    Number of children: None    Years of education: None    Highest education level: None   Tobacco Use    Smoking status: Former    Smokeless tobacco: Never   Vaping Use    Vaping Use: Never used   Substance and Sexual Activity    Alcohol use: Not Currently    Drug use: Never       SCREENINGS   [place] Coma Scale  Eye Opening: Spontaneous  Best Verbal Response: Confused  Best Motor Response: Obeys commands  Ida Coma Scale Score: 14  Ebola Virus Disease (EVD) Screening   Temp: 97.9 °F (36.6 °C)  CIWA Assessment  BP: 113/73  Heart Rate: 78    PHYSICAL EXAM    (up to 7 for level 4, 8 or more for level 5)     ED Triage Vitals [01/08/23 1644]   BP Temp Temp src Heart Rate Resp SpO2 Height Weight   113/65 97.5 °F (36.4 °C) -- 73 20 100 % -- 118 lb (53.5 kg)       Physical Exam  Vitals and nursing note reviewed. Constitutional:       General: She is not in acute distress. Appearance: She is well-developed. HENT:      Head: Normocephalic and atraumatic. Right Ear: External ear normal.      Left Ear: External ear normal.      Nose: Nose normal.      Mouth/Throat:      Mouth: Mucous membranes are moist.   Eyes:      General:         Right eye: No discharge. Left eye: Discharge present. Comments: Lt eye ectropion   Cardiovascular:      Rate and Rhythm: Normal rate and regular rhythm. Heart sounds: Normal heart sounds. Pulmonary:      Effort: Pulmonary effort is normal. No respiratory distress. Breath sounds: Normal breath sounds. No stridor. Abdominal:      General: Bowel sounds are normal. There is no distension. Palpations: Abdomen is soft. Tenderness: There is no abdominal tenderness. Musculoskeletal:         General: Normal range of motion. Cervical back: Normal range of motion and neck supple. Skin:     General: Skin is warm. Findings: No erythema. Neurological:      Mental Status: She is alert and oriented to person, place, and time. GCS: GCS eye subscore is 4. GCS verbal subscore is 5. GCS motor subscore is 6. Motor: No weakness. RESULTS     EKG: All EKG's are interpreted by the Emergency Department Physician who either signs or Co-signsthis chart in the absence of a cardiologist.     I reviewed Ekg sinus regan/ rate 49/ neg st elevation.     RADIOLOGY:   Non-plain filmimages such as CT, Ultrasound and MRI are read by the radiologist. Plain radiographic images are visualized and preliminarily interpreted by the emergency physician with the below findings: Interpretation per the Radiologist below, if available at the time ofthis note:    CT Head W/O Contrast   Final Result   No acute intracranial abnormality.                ED BEDSIDE ULTRASOUND:   Performed by ED Physician - none    LABS:  Labs Reviewed   CBC WITH AUTO DIFFERENTIAL - Abnormal; Notable for the following components:       Result Value    RDW 14.9 (*)     Lymphocytes Absolute 0.9 (*)     All other components within normal limits   URINALYSIS WITH REFLEX TO CULTURE - Abnormal; Notable for the following components:    Leukocyte Esterase, Urine TRACE (*)     All other components within normal limits   COMPREHENSIVE METABOLIC PANEL - Abnormal; Notable for the following components:    Potassium 3.1 (*)     CO2 32 (*)     Anion Gap 8 (*)     Calcium 8.4 (*)     Total Protein 5.6 (*)     Albumin 2.9 (*)     All other components within normal limits   CBC WITH AUTO DIFFERENTIAL - Abnormal; Notable for the following components:    RBC 3.83 (*)     Hemoglobin 11.8 (*)     Hematocrit 34.7 (*)     Eosinophils Absolute 0.8 (*)     All other components within normal limits    Narrative:     Collection has been rescheduled by Jennifer Pineda at 01/09/2023 04:55 Reason:   Need help pt confused   COMPREHENSIVE METABOLIC PANEL W/ REFLEX TO MG FOR LOW K - Abnormal; Notable for the following components:    BUN 7 (*)     Calcium 8.4 (*)     Total Protein 5.1 (*)     Albumin 2.7 (*)     All other components within normal limits    Narrative:     Collection has been rescheduled by Jennifer Pineda at 01/09/2023 04:55 Reason:   Need help pt confused   CBC WITH AUTO DIFFERENTIAL - Abnormal; Notable for the following components:    RBC 4.04 (*)     Hematocrit 36.4 (*)     RDW 14.8 (*)     All other components within normal limits   COMPREHENSIVE METABOLIC PANEL W/ REFLEX TO MG FOR LOW K - Abnormal; Notable for the following components:    CO2 32 (*)     Calcium 8.4 (*)     Total Protein 5.4 (*)     Albumin 2.8 (*)     All other components within normal limits   COVID-19, RAPID   ETHANOL   URINE DRUG SCREEN   SPECIMEN REJECTION   MICROSCOPIC URINALYSIS       All other labs were within normal range or not returned as of this dictation. EMERGENCY DEPARTMENT COURSE and DIFFERENTIAL DIAGNOSIS/MDM:   Vitals:    Vitals:    01/09/23 1909 01/10/23 0512 01/10/23 0719 01/10/23 1228   BP: (!) 129/54  (!) 158/76 113/73   Pulse: 69  73 78   Resp: 18  18    Temp: 98.2 °F (36.8 °C)  97.9 °F (36.6 °C)    TempSrc: Oral  Axillary    SpO2: 100%  99%    Weight:  117 lb (53.1 kg)     Height:                MDM  Number of Diagnoses or Management Options  Diagnosis management comments: Patient's daughter provides much information states patient's been like this for approximately a week was started Keflex 3 times a day but only taking it twice a day since Wednesday for UTI she is concerned for patient's safety at home alone. 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 Differential includes dementia CVA UTI. Patient is can. She does not know her name is having trouble following conversation and answers questions. Discussed with hospitalist will admit       Amount and/or Complexity of Data Reviewed  Clinical lab tests: reviewed and ordered  Tests in the radiology section of CPT®: reviewed and ordered  Tests in the medicine section of CPT®: reviewed  Obtain history from someone other than the patient: yes               CONSULTS:  IP CONSULT TO CASE MANAGEMENT    PROCEDURES:  Unless otherwise noted below, none     Procedures    FINAL IMPRESSION      1. Altered mental status, unspecified altered mental status type          DISPOSITION/PLAN   DISPOSITION Admitted 01/08/2023 08:01:38 PM      PATIENT REFERRED TO:  No follow-up provider specified.     DISCHARGE MEDICATIONS:  Current Discharge Medication List        START taking these medications    Details   lisinopril (PRINIVIL;ZESTRIL) 5 MG tablet Take 1 tablet by mouth daily  Qty: 30 tablet, Refills: 3                (Please note that portions of this note were completed with a voice recognition program.  Efforts were made to edit the dictations but occasionally words are mis-transcribed.)    Nasim Berry PA-C (electronically signed)  Attending Emergency Physician        Nasim Berry PA-C  01/10/23 0323

## 2023-01-08 NOTE — CARE COORDINATION
[address] [facility] Case Management Initial Discharge Assessment    Met with DTR DIEGO BROWN (WHOM PT LIVES WITH) PT UNABLE TO PARTICIPATE A& O X 1  to discuss discharge plan. PCP: RONAL Armstrong CNP                                Date of Last Visit: 1/6/23    [facility] Patient: No        [facility] Notified: no    If no PCP, list provided? N/A    Discharge Planning    Living Arrangements: at home dependent on family care, at home dependent on nursing care, and HAS REQUIRED 24' CARE FOR THE LAST WEEK OR SO PRIOR TO THAT DTR WAS ABLE TO LEAVE PT HOME [address]    Who do you live with? DTR SHAHAB (SHE HAS 5 SIBLINGS , 1 THAT IS NOT INVOLVED IN CARE & NO ONE IS POA)    Who helps you with your care:  Palomo Yaa     If lives at home:     Do you have any barriers navigating in your home? no    Patient can perform ADL? No SHE IS BECOMING RESISTANT TO CARE 2' FEAR OF FALLING DTR SHOWERS HER 1X MONTH WEARS BRIEF WHEN OUT UNSTEADY USING CANE    Current Services (outpatient and in home) :  None    Dialysis: No    Is transportation available to get to your appointments? Yes DTR     DME Equipment:  yes - HAS CANE, WALKER     Respiratory equipment: None    Respiratory provider:  no     Pharmacy:  yes - [facility]    Consult with Medication Assistance Program?  No      Patient agreeable to Alberto Dennis? N/A    Patient agreeable to SNF/Rehab? Yes, Company SNF LIST GIVEN NEEDS SNF WITH [facility] UNIT LSW WILL NEED TO CALL DTR & HELP CHOOSE, ALSO I INQUIRED IF OTHER SIBLINGS WILL AGREE SHE STATES MOST WILL     Other discharge needs identified? Does Patient Have a High-Risk for Readmission Diagnosis (CHF, PN, MI, COPD)? No      Initial Discharge Plan? (Note: please see concurrent daily documentation for any updates after initial note).     LSW PLZ REACH OUT TO DTR Tenisha Sanabria [phone] TO HELP HER SELECT SNF W [facility]EMENTIA  [facility] # AND ABBIE CODE.  Readmission Risk              Risk of Unplanned Readmission:  0         Electronically signed by Genesis Martinez RN on 1/8/2023 at 6:52 PM

## 2023-01-08 NOTE — ED TRIAGE NOTES
Patient currently taking Keflex for a UTI. Per daughter, patient has had increasing confusion, paranoia, unstable emotions  Daughter states that she does not believe patient is safe to be left alone at home. Patient states \"I don't know how to describe how I feel\"  Patient was ordered Keflex three times a day for her UTI but daughter has only been able to get patient to take them twice a day.

## 2023-01-09 LAB
ALBUMIN SERPL-MCNC: 2.7 G/DL (ref 3.5–4.6)
ALP BLD-CCNC: 95 U/L (ref 40–130)
ALT SERPL-CCNC: 8 U/L (ref 0–33)
ANION GAP SERPL CALCULATED.3IONS-SCNC: 9 MEQ/L (ref 9–15)
AST SERPL-CCNC: 15 U/L (ref 0–35)
BASOPHILS ABSOLUTE: 0.1 K/UL (ref 0–0.2)
BASOPHILS RELATIVE PERCENT: 1.4 %
BILIRUB SERPL-MCNC: 0.7 MG/DL (ref 0.2–0.7)
BUN BLDV-MCNC: 7 MG/DL (ref 8–23)
CALCIUM SERPL-MCNC: 8.4 MG/DL (ref 8.5–9.9)
CHLORIDE BLD-SCNC: 105 MEQ/L (ref 95–107)
CO2: 30 MEQ/L (ref 20–31)
CREAT SERPL-MCNC: 0.61 MG/DL (ref 0.5–0.9)
EKG ATRIAL RATE: 49 BPM
EKG P AXIS: 101 DEGREES
EKG P-R INTERVAL: 128 MS
EKG Q-T INTERVAL: 446 MS
EKG QRS DURATION: 84 MS
EKG QTC CALCULATION (BAZETT): 402 MS
EKG R AXIS: 40 DEGREES
EKG T AXIS: 40 DEGREES
EKG VENTRICULAR RATE: 49 BPM
EOSINOPHILS ABSOLUTE: 0.8 K/UL (ref 0–0.7)
EOSINOPHILS RELATIVE PERCENT: 12.6 %
GFR SERPL CREATININE-BSD FRML MDRD: >60 ML/MIN/{1.73_M2}
GLOBULIN: 2.4 G/DL (ref 2.3–3.5)
GLUCOSE BLD-MCNC: 79 MG/DL (ref 70–99)
HCT VFR BLD CALC: 34.7 % (ref 37–47)
HEMOGLOBIN: 11.8 G/DL (ref 12–16)
LYMPHOCYTES ABSOLUTE: 1.1 K/UL (ref 1–4.8)
LYMPHOCYTES RELATIVE PERCENT: 17.5 %
MCH RBC QN AUTO: 30.8 PG (ref 27–31.3)
MCHC RBC AUTO-ENTMCNC: 34 % (ref 33–37)
MCV RBC AUTO: 90.6 FL (ref 79.4–94.8)
MONOCYTES ABSOLUTE: 0.6 K/UL (ref 0.2–0.8)
MONOCYTES RELATIVE PERCENT: 10 %
NEUTROPHILS ABSOLUTE: 3.8 K/UL (ref 1.4–6.5)
NEUTROPHILS RELATIVE PERCENT: 58.5 %
PDW BLD-RTO: 14.5 % (ref 11.5–14.5)
PLATELET # BLD: 313 K/UL (ref 130–400)
POTASSIUM REFLEX MAGNESIUM: 3.6 MEQ/L (ref 3.4–4.9)
RBC # BLD: 3.83 M/UL (ref 4.2–5.4)
SODIUM BLD-SCNC: 144 MEQ/L (ref 135–144)
TOTAL PROTEIN: 5.1 G/DL (ref 6.3–8)
WBC # BLD: 6.4 K/UL (ref 4.8–10.8)

## 2023-01-09 PROCEDURE — 93010 ELECTROCARDIOGRAM REPORT: CPT | Performed by: INTERNAL MEDICINE

## 2023-01-09 PROCEDURE — 96372 THER/PROPH/DIAG INJ SC/IM: CPT

## 2023-01-09 PROCEDURE — G0378 HOSPITAL OBSERVATION PER HR: HCPCS

## 2023-01-09 PROCEDURE — 2500000003 HC RX 250 WO HCPCS: Performed by: INTERNAL MEDICINE

## 2023-01-09 PROCEDURE — 6370000000 HC RX 637 (ALT 250 FOR IP): Performed by: INTERNAL MEDICINE

## 2023-01-09 PROCEDURE — 85025 COMPLETE CBC W/AUTO DIFF WBC: CPT

## 2023-01-09 PROCEDURE — 96374 THER/PROPH/DIAG INJ IV PUSH: CPT

## 2023-01-09 PROCEDURE — 36415 COLL VENOUS BLD VENIPUNCTURE: CPT

## 2023-01-09 PROCEDURE — 80053 COMPREHEN METABOLIC PANEL: CPT

## 2023-01-09 PROCEDURE — 92523 SPEECH SOUND LANG COMPREHEN: CPT

## 2023-01-09 PROCEDURE — 92610 EVALUATE SWALLOWING FUNCTION: CPT

## 2023-01-09 PROCEDURE — 6360000002 HC RX W HCPCS

## 2023-01-09 PROCEDURE — 97162 PT EVAL MOD COMPLEX 30 MIN: CPT

## 2023-01-09 PROCEDURE — 6370000000 HC RX 637 (ALT 250 FOR IP)

## 2023-01-09 PROCEDURE — 97166 OT EVAL MOD COMPLEX 45 MIN: CPT

## 2023-01-09 RX ORDER — LISINOPRIL 5 MG/1
5 TABLET ORAL DAILY
Status: DISCONTINUED | OUTPATIENT
Start: 2023-01-09 | End: 2023-01-12 | Stop reason: HOSPADM

## 2023-01-09 RX ORDER — HALOPERIDOL 1 MG/1
2 TABLET ORAL ONCE
Status: COMPLETED | OUTPATIENT
Start: 2023-01-09 | End: 2023-01-09

## 2023-01-09 RX ORDER — POTASSIUM CHLORIDE 20 MEQ/1
40 TABLET, EXTENDED RELEASE ORAL ONCE
Status: DISCONTINUED | OUTPATIENT
Start: 2023-01-09 | End: 2023-01-09

## 2023-01-09 RX ORDER — ENALAPRILAT 2.5 MG/2ML
1.25 INJECTION INTRAVENOUS EVERY 6 HOURS SCHEDULED
Status: DISCONTINUED | OUTPATIENT
Start: 2023-01-09 | End: 2023-01-09

## 2023-01-09 RX ADMIN — Medication 1 TABLET: at 10:10

## 2023-01-09 RX ADMIN — Medication 3 MG: at 00:13

## 2023-01-09 RX ADMIN — ENALAPRILAT 1.25 MG: 1.25 INJECTION INTRAVENOUS at 08:39

## 2023-01-09 RX ADMIN — ENOXAPARIN SODIUM 40 MG: 100 INJECTION SUBCUTANEOUS at 08:39

## 2023-01-09 RX ADMIN — HALOPERIDOL 2 MG: 1 TABLET ORAL at 01:53

## 2023-01-09 RX ADMIN — LISINOPRIL 5 MG: 5 TABLET ORAL at 13:28

## 2023-01-09 RX ADMIN — SERTRALINE HYDROCHLORIDE 25 MG: 25 TABLET ORAL at 10:11

## 2023-01-09 NOTE — PLAN OF CARE
Nutrition Problem #1: Moderate malnutrition, In context of social or environmental circumstances  Intervention: Food and/or Nutrient Delivery: Continue Current Diet, Start Oral Nutrition Supplement

## 2023-01-09 NOTE — ED NOTES
Pt lives at home with daughter for past 3 years, hx. Of dementia, hx. Stroke, recent uti. Pt to er today d/t getting more confused over past week. Per pt's daughter pt not able to stay home alone while she goes to work.      Caroline Jackson RN  01/08/23 2044

## 2023-01-09 NOTE — PLAN OF CARE
See OT evaluation for all goals and OT POC.  Electronically signed by Kiana Pérez OT on 1/9/2023 at 11:51 AM

## 2023-01-09 NOTE — PROGRESS NOTES
[facility]   Facility/Department: [facility] [place] 2W Eulis Libman  Speech Language Pathology  Initial Speech/Language/Cognitive Assessment    NAME:Rayna Trevizo [facility]  : 1940 (79 y.o.)   [x]   confirmed    MRN: [id number]  ROOM: W266/W266-01  ADMISSION DATE: 2023  PATIENT DIAGNOSIS(ES): Dementia due to Alzheimer's disease ([facility] [G30.9, F02.80]  Chief Complaint   Patient presents with    Altered Mental Status     Per daughter patient has had some paranoia and confusion     Patient Active Problem List    Diagnosis Date Noted    Dementia due to Alzheimer's disease ([facility] 2023     Past Medical History:   Diagnosis Date    Cerebral artery occlusion with cerebral infarction ([facility]     Dementia ([facility]      Past Surgical History:   Procedure Laterality Date    ANKLE FRACTURE SURGERY Left     CHOLECYSTECTOMY      TUBAL LIGATION         Date of Onset: 23    Date of Evaluation: 2023   Evaluating Therapist: HUDSON Rangel        Diagnosis: Pt p/w moderate-severe cognitive-linguistic deficits characterized by decreased orientation, immediate and short term recall, safety awareness/insight, verbal problem solving, impaired 2-3 step auditory comprehension and word finding deficits in conversation along with impaired confrontation naming. Pt would benefit from skilled speech therapy services for aforementioned deficits to return safely to [facility]. Requires SLP Intervention: Yes     D/C Recommendations: No follow up therapy recommended post discharge    General  Behavior/Cognition: Alert; Cooperative;Confused   Respiratory Status: Room air  O2 Device: None (Room air)  Previous level of function and limitations:   Social/Functional History  Lives With: Daughter  Type of Home: House  Home Layout: One level  Home Access: Stairs to enter with rails  Entrance Stairs - Number of Steps: \"some\" unable to provide number  Bathroom Shower/Tub: Walk-in shower (pt states she sponge baths vs showering)  Home Equipment:  (N/A)  ADL Assistance: Independent  Homemaking Assistance: Independent (pt states she completes light meal prep)  Ambulation Assistance: Independent (pt states furniture walks)  Transfer Assistance: Independent  Additional Comments: questionable historian    Vision and Hearing  Vision  Vision: Impaired  Hearing  Hearing: Within functional limits     Oral/Motor  Oral Motor   Labial: No impairment  Dentition: Upper & lower dentures  Oral Hygiene: Moist;Clean  Lingual: No impairment  Velum: No Impairment  Mandible: No impairment    Motor Speech  Motor Speech  Apraxic Characteristics: None  Dysarthric Characteristics: None  Intelligibility: No impairment  Overall Impairment Severity: None    Comprehension  Auditory Comprehension  Comprehension: Exceptions  One Step Commands: WFL  Two Step Commands: Mild  Multistep Commands: Mild  Conversation: Mild    Expression  Expression  Primary Mode of Expression: Verbal  Verbal Expression  Verbal Expression: Exceptions to functional limits  Confrontation: Mild (named 4/5 acc and had anomia episode in conversation)  Interfering Components: Impaired thought organization;Paraphasia    Cognition  Orientation  Overall Orientation Status: Impaired  Orientation Level: Oriented to person;Oriented to place; Disoriented to situation;Disoriented to time  Memory  Memory: Exceptions to [facility]  Daily Routines: Moderate  Long-term Memory: Moderate  People Encountered: Severe  Short-term Memory: Severe  Working Memory: Severe  Problem Solving  Problem Solving: Exceptions to [facility]  Simple Functional Tasks: Moderate (unable to recall or verbalize use of call light)  Verbal Reasoning Skills: Moderate  Safety/Judgment  Safety/Judgment: Exceptions to [facility]  Unable to Self-monitor and Self-correct Consistently:  Moderate  Insight: Moderate      Additional Assessments       Recommendations  Requires SLP Intervention: Yes  D/C Recommendations: No follow up therapy recommended post discharge  Patient Education: pt educated on results of testing  Patient Education Response: Needs reinforcement  Duration of Treatment: during LOS or until goals met  Frequency of Treatment: 2-3x/week    Prognosis  Speech Therapy Prognosis  Prognosis Considerations: Age;Previous Level of Function    Education  Individuals consulted  Consulted and agree with results and recommendations: Patient;RN  RN Name: Casimiro    Treatment/Goals  Short Term Goals  Time Frame for Short Term Goals: 1-2 weeks  Goal 1: To address pt's cognitive deficits and promote orientation, pt will state name of facility, time within 1 hour, reason in hospital, current month and year with 100% accuracy with min assist, with use of external aid. Goal 2: To increase safety awareness and judgment for safe completion of ADLs secondary to pt's cognitive deficits, pt will provide reasonable solutions to problems of everyday living with 80% accuracy and min cues. Goal 3: Pt will complete confrontational naming tasks with 80% accuracy with mild verbal cues to help the patient express their basic wants and needs. Goal 4: Pt will complete descriptive naming tasks with 80% accuracy with min verbal cues to promote use of circumlocution strategy and help the patient express their basic personal, safety, and medical wants and needs in the presence of communication deficits. Long Term Goals  Time Frame for Long Term Goals: 1-2 weeks  Goal 1: Pt will improve his/her cognitive linguistic abilities to a min assist level (from mod-max level) in order to safely complete ADLs in all opportunities. Goal 2: Pt will improve his/her Expressive Language abilities to a conversation level for expression of complex wants, needs, feelings/ideas, and medical/safety information. Patient's goals: To remember things    Safety Devices  Safety Devices  Safety Devices in place: Yes  Type of devices: Call light within reach; Chair alarm in place; Telesitter in use    Pain Assessment  Patient does not c/o pain.     Pain Re-assessment  Patient does not c/o pain.     Therapy Time   Time in: 10 Nicolás Tolliver  Time out: 1004  Minutes: 19             Signature: Electronically signed by HUDSON Calixto on 1/9/2023 at 11:28 AM

## 2023-01-09 NOTE — H&P
[facility] MEDICINE    HISTORY AND PHYSICAL EXAM    PATIENT NAME:  Ophelia Ribera    MRN:  [id number]  SERVICE DATE:  1/8/2023   SERVICE TIME:  8:16 PM    Primary Care Physician: RONAL Giron CNP     SUBJECTIVE  CHIEF COMPLAINT:  Altered Mental Status (Per daughter patient has had some paranoia and confusion)       HPI:     Ophelia Ribera is a 79 y.o. female presented to the emergency department with complaints of change in mental status. Patient is able to tell me her name, and is confused about date and time. She appears very anxious and afraid. She states she lives with her daughter and that her daughter brought her to the hospital. She needed to use the restroom and ambulated to the toilet independently. She does state that she falls at home occasionally. She states that she is \"getting old and forgetful\". When stimulus was reduced, her anxiety slightly lessened. She states she has been choking on her food at home. She was able to [place] thin liquids for me while in room. She is afraid and paranoid and needs constant reorientation. She is afraid she is going to do something that isn't good for her body. Ophelia Ribera is a 79 y.o. female  has a past medical history of Cerebral artery occlusion with cerebral infarction ([address] and Dementia ([address] is being admitted for Dementia due to Alzheimer's disease ([address] PAST MEDICAL HISTORY:    Past Medical History:   Diagnosis Date    Cerebral artery occlusion with cerebral infarction ([address]     Dementia ([address]      PAST SURGICAL HISTORY:    Past Surgical History:   Procedure Laterality Date    ANKLE FRACTURE SURGERY Left     CHOLECYSTECTOMY      TUBAL LIGATION       FAMILY HISTORY:  History reviewed. No pertinent family history. SOCIAL HISTORY:    Social History     Socioeconomic History    Marital status:       Spouse name: Not on file    Number of children: Not on file    Years of education: Not on file    Highest education level: Not on file   Occupational History    Not on file   Tobacco Use    Smoking status: Former    Smokeless tobacco: Never   Vaping Use    Vaping Use: Never used   Substance and Sexual Activity    Alcohol use: Not Currently    Drug use: Never    Sexual activity: Not on file   Other Topics Concern    Not on file   Social History Narrative    Not on file     Social Determinants of Health     Financial Resource Strain: Not on file   Food Insecurity: Not on file   Transportation Needs: Not on file   Physical Activity: Not on file   Stress: Not on file   Social Connections: Not on file   Intimate Partner Violence: Not on file   Housing Stability: Not on file     MEDICATIONS:   Prior to Admission medications    Medication Sig Start Date End Date Taking? Authorizing Provider   cephALEXin (KEFLEX) 500 MG capsule Take 500 mg by mouth 3 times daily Only taking it twice a day   Yes Historical Provider, MD   sertraline (ZOLOFT) 50 MG tablet Take 25 mg by mouth daily 1/6/23  Yes Historical Provider, MD   Multiple Vitamins-Minerals (MULTIVITAMIN ADULT) CHEW Take 1 tablet by mouth daily   Yes Historical Provider, MD   betamethasone dipropionate (DIPROLENE) 0.05 % ointment Apply topically 2 times daily. 5/15/21   Ledy Verdugo PA-C   lidocaine (XYLOCAINE) 5 % ointment Apply topically as needed. 6/6/20   Ольга Palm, APRN - CNP       ALLERGIES: Nuts [peanut-containing drug products] and Shellfish-derived products    REVIEW OF SYSTEM:   Review of Systems   Unable to perform ROS: Mental status change   Musculoskeletal:         Pain in feet   Psychiatric/Behavioral:  Positive for confusion. The patient is nervous/anxious. OBJECTIVE  PHYSICAL EXAM: BP (!) 151/59   Pulse 75   Temp 97.5 °F (36.4 °C)   Resp 16   Ht 5' 4\" (1.626 m)   Wt 118 lb (53.5 kg)   SpO2 97%   BMI 20.25 kg/m²   Physical Exam  Vitals and nursing note reviewed. Constitutional:       General: She is awake. She is not in acute distress.      Appearance: Normal appearance. She is well-developed and normal weight. She is not ill-appearing, toxic-appearing or diaphoretic. HENT:      Head: Normocephalic and atraumatic. Nose: Nose normal. No congestion or rhinorrhea. Mouth/Throat:      Lips: Pink. Mouth: Mucous membranes are moist.      Tongue: Tongue does not deviate from midline. Pharynx: Oropharynx is clear. No oropharyngeal exudate or posterior oropharyngeal erythema. Eyes:      General: Lids are normal. No visual field deficit or scleral icterus. Extraocular Movements: Extraocular movements intact. Right eye: No nystagmus. Left eye: No nystagmus. Conjunctiva/sclera: Conjunctivae normal.      Comments: Mechanical ectropion of left lower eyelid   Neck:      Thyroid: No thyromegaly. Vascular: No JVD. Trachea: Trachea and phonation normal.   Cardiovascular:      Rate and Rhythm: Normal rate and regular rhythm. Pulses: Normal pulses. Radial pulses are 2+ on the right side and 2+ on the left side. Dorsalis pedis pulses are 2+ on the right side and 2+ on the left side. Heart sounds: Normal heart sounds, S1 normal and S2 normal. No murmur heard. Pulmonary:      Effort: Pulmonary effort is normal. No respiratory distress. Breath sounds: Normal breath sounds and air entry. No stridor or decreased air movement. No decreased breath sounds, wheezing, rhonchi or rales. Chest:      Chest wall: No tenderness. Abdominal:      General: Abdomen is flat. Bowel sounds are normal. There is no distension. Palpations: Abdomen is soft. Tenderness: There is no abdominal tenderness. There is no guarding. Musculoskeletal:         General: No swelling, tenderness, deformity or signs of injury. Normal range of motion. Cervical back: Normal range of motion and neck supple. No rigidity or tenderness. Right lower leg: No edema. Left lower leg: No edema.    Feet:      Right foot: Skin integrity: Skin integrity normal.      Left foot:      Skin integrity: Skin integrity normal.   Skin:     General: Skin is warm and dry. Capillary Refill: Capillary refill takes less than 2 seconds. Coloration: Skin is not jaundiced or pale. Findings: Rash present. No bruising, erythema or lesion. Nails: There is no clubbing. Comments: Rash on upper thigh that she states is itching. Small pin point scabs. Neurological:      General: No focal deficit present. Mental Status: She is alert. Mental status is at baseline. Sensory: Sensation is intact. Motor: Motor function is intact. No weakness. Psychiatric:         Attention and Perception: She is inattentive. Mood and Affect: Mood is anxious. Speech: Speech normal.         Behavior: Behavior is agitated and hyperactive. Behavior is cooperative. Thought Content: Thought content is paranoid. Cognition and Memory: Cognition is impaired. She exhibits impaired recent memory. She does not exhibit impaired remote memory. Judgment: Judgment normal.     Neurologic Exam     Mental Status   Oriented to person. Disoriented to place. Disoriented to time. Attention: normal. Concentration: normal.   Speech: speech is normal      DATA:     Diagnostic tests reviewed for today's visit:    Most recent labs and imaging results reviewed.      LABS:    Recent Results (from the past 24 hour(s))   CBC with Auto Differential    Collection Time: 01/08/23  5:30 PM   Result Value Ref Range    WBC 7.5 4.8 - 10.8 K/uL    RBC 4.34 4.20 - 5.40 M/uL    Hemoglobin 13.2 12.0 - 16.0 g/dL    Hematocrit 39.3 37.0 - 47.0 %    MCV 90.7 79.4 - 94.8 fL    MCH 30.4 27.0 - 31.3 pg    MCHC 33.5 33.0 - 37.0 %    RDW 14.9 (H) 11.5 - 14.5 %    Platelets 291 225 - 259 K/uL    Neutrophils % 74.3 %    Lymphocytes % 11.8 %    Monocytes % 6.8 %    Eosinophils % 6.8 %    Basophils % 0.3 %    Neutrophils Absolute 5.6 1.4 - 6.5 K/uL    Lymphocytes Absolute 0.9 (L) 1.0 - 4.8 K/uL    Monocytes Absolute 0.5 0.2 - 0.8 K/uL    Eosinophils Absolute 0.5 0.0 - 0.7 K/uL    Basophils Absolute 0.0 0.0 - 0.2 K/uL   ETOH    Collection Time: 01/08/23  5:30 PM   Result Value Ref Range    Ethanol Lvl <10 mg/dL    Ethanol percent Not indicated G/dL   Urinalysis with Reflex to Culture    Collection Time: 01/08/23  5:30 PM    Specimen: Urine   Result Value Ref Range    Color, UA Yellow Straw/Yellow    Clarity, UA Clear Clear    Glucose, Ur Negative Negative mg/dL    Bilirubin Urine Negative Negative    Ketones, Urine Negative Negative mg/dL    Specific Gravity, UA 1.012 1.005 - 1.030    Blood, Urine Negative Negative    pH, UA 7.0 5.0 - 9.0    Protein, UA Negative Negative mg/dL    Urobilinogen, Urine 0.2 <2.0 E.U./dL    Nitrite, Urine Negative Negative    Leukocyte Esterase, Urine TRACE (A) Negative    Urine Reflex to Culture Not Indicated    Urine Drug Screen    Collection Time: 01/08/23  5:30 PM   Result Value Ref Range    Amphetamine Screen, Urine Neg Negative <1000 ng/mL    Barbiturate Screen, Ur Neg Negative < 200 ng/mL    Benzodiazepine Screen, Urine Neg Negative < 200 ng/mL    Cannabinoid Scrn, Ur Neg Negative < 50 ng/mL    Cocaine Metabolite Screen, Urine Neg Negative < 300 ng/mL    Opiate Scrn, Ur Neg Negative < 300 ng/mL    PCP Screen, Urine Neg Negative < 25 ng/mL    Methadone Screen, Urine Neg Negative <300 ng/mL    Propoxyphene Scrn, Ur Neg Negative <300 ng/mL    Oxycodone Urine Neg Negative <100 ng/mL    FENTANYL SCREEN, URINE Neg Negative < 50 ng/mL    Drug Screen Comment: see below    Microscopic Urinalysis    Collection Time: 01/08/23  5:30 PM   Result Value Ref Range    Bacteria, UA Negative Negative /HPF    Hyaline Casts, UA 0-1 0 - 5 /HPF    WBC, UA 0-2 0 - 5 /HPF    RBC, UA 0-2 0 - 5 /HPF    Epithelial Cells, UA 0-2 0 - 5 /HPF   EKG 12 Lead    Collection Time: 01/08/23  5:33 PM   Result Value Ref Range    Ventricular Rate 49 BPM Atrial Rate 49 BPM    P-R Interval 128 ms    QRS Duration 84 ms    Q-T Interval 446 ms    QTc Calculation (Bazett) 402 ms    P Axis 101 degrees    R Axis 40 degrees    T Axis 40 degrees   COVID-19, Rapid    Collection Time: 01/08/23  5:36 PM    Specimen: Nasopharyngeal Swab   Result Value Ref Range    SARS-CoV-2, NAAT Not Detected Not Detected   SPECIMEN REJECTION    Collection Time: 01/08/23  6:06 PM   Result Value Ref Range    Rejected Test cmp     Reason for Rejection see below    Comprehensive Metabolic Panel    Collection Time: 01/08/23  6:13 PM   Result Value Ref Range    Sodium 139 135 - 144 mEq/L    Potassium 3.1 (L) 3.4 - 4.9 mEq/L    Chloride 99 95 - 107 mEq/L    CO2 32 (H) 20 - 31 mEq/L    Anion Gap 8 (L) 9 - 15 mEq/L    Glucose 94 70 - 99 mg/dL    BUN 9 8 - 23 mg/dL    Creatinine 0.67 0.50 - 0.90 mg/dL    Est, Glom Filt Rate >60.0 >60    Calcium 8.4 (L) 8.5 - 9.9 mg/dL    Total Protein 5.6 (L) 6.3 - 8.0 g/dL    Albumin 2.9 (L) 3.5 - 4.6 g/dL    Total Bilirubin 0.6 0.2 - 0.7 mg/dL    Alkaline Phosphatase 105 40 - 130 U/L    ALT 9 0 - 33 U/L    AST 14 0 - 35 U/L    Globulin 2.7 2.3 - 3.5 g/dL       IMAGING:   CT Head W/O Contrast    Result Date: 1/8/2023  No acute intracranial abnormality. VTE Prophylaxis: low molecular weight heparin -  start     ASSESSMENT AND PLAN    Dementia  Paranoid and confused. Alert to self. Needs reassurance that she is not harming herself. She is afraid that she is doing something to to harm herself. Admit  Case management for discharge placement  PT/OT/SLP  Vistaril  Avasis  Bed alarm engaged at all times  Hypokalemia  Potassium 3.1, got 25 mEq potassium  Monitor potassium, maximize electrolytes and replenish to maintain potassium above 4.0.    Dysphagia  Patient reports having trouble swallowing, tolerated thin liquids with pills   SLP  NPO with sips and meds until seen by SLP  Puritis  Patient itching tops of thighs, small scabs from scratching  Vistaril  Depression  Takes zoloft  Restart home med    Plan of care discussed with: patient    SIGNATURE: RONAL Grewal - CODEY  DATE: January 8, 2023  TIME: 8:16 PM     Duarte Ye DO - supervising physician

## 2023-01-09 NOTE — PROGRESS NOTES
Physical Therapy Med Surg Initial Assessment  Facility/Department: [facility]  Room: [facility]/W318-10       NAME: Homa Friend  : 1940 (79 y.o.)  MRN: [id number]  CODE STATUS: Full Code    Date of Service: 2023    Patient Diagnosis(es): Dementia due to Alzheimer's disease ([facility] [G30.9, F02.80]   Chief Complaint   Patient presents with    Altered Mental Status     Per daughter patient has had some paranoia and confusion     Patient Active Problem List    Diagnosis Date Noted    Dementia due to Alzheimer's disease ([facility] 2023        Past Medical History:   Diagnosis Date    Cerebral artery occlusion with cerebral infarction ([facility]     Dementia ([facility]      Past Surgical History:   Procedure Laterality Date    ANKLE FRACTURE SURGERY Left     CHOLECYSTECTOMY      TUBAL LIGATION         Chart Reviewed: Yes  Family / Caregiver Present: No  Diagnosis: Dementia due to Alzheimer's disease ([facility]  General Comment  Comments: pt resting in bed - agreeable to PT evaluation    Restrictions:  Restrictions/Precautions: Fall Risk  Position Activity Restriction  Other position/activity restrictions: avasys     SUBJECTIVE:   Subjective: \"I just don't know what's going on. \"    Pain  Pain: Denies pre and post session pain. Prior Level of Function:  Social/Functional History  Lives With: Alone  Type of Home: House  Home Layout: One level  Home Access: Stairs to enter with rails  Entrance Stairs - Number of Steps: 3  Entrance Stairs - Rails: Right  Bathroom Shower/Tub: Tub/Shower unit (pt states that she hasn't showered in a while.)  ADL Assistance: Independent  Ambulation Assistance: Independent  Transfer Assistance: Independent  Additional Comments: Pt states that she doesn't use an AD for walking, but states that she \"should\" because she often feels dizzy.     OBJECTIVE:   Vision  Vision: Within Functional Limits  Hearing: Within functional limits    Cognition:  Orientation Level: Oriented to person, Disoriented to situation, Disoriented to time, Disoriented to place  Follows Commands:  (Requires repetition and redirection)    Observation/Palpation  Observation: No acute distress noted. Pt pleasant and motivated. Confused and appears mildly anxious. ROM:  RLE PROM: WFL  LLE PROM: WFL    Strength:  Strength RLE  Strength RLE: WFL  Strength LLE  Strength LLE: WFL    Neuro:  Balance  Sitting - Static: Good  Sitting - Dynamic: Good  Standing - Static: Good;-  Standing - Dynamic: Good;-;Fair;+  Comments: Balance contingent on pt's attention to task and confusion level                      Tone: Normal    Sensation: Intact    Bed mobility  Supine to Sit: Supervision  Sit to Supine: Supervision    Transfers  Sit to Stand: Supervision  Stand to Sit: Supervision  Bed to Chair: Supervision    Ambulation  Surface: Level tile  Device: No Device  Assistance: Stand by assistance  Quality of Gait: inconsistent pattern and frequent deviation from straight path. Requires verbal cues for direction and attention to task. Distance: 100ft                   Activity Tolerance  Activity Tolerance: Patient tolerated treatment well    Patient Education  Education Given To: Patient  Education Provided: Role of Therapy;Plan of Care  Education Method: Verbal  Education Outcome: Verbalized understanding;Continued education needed       ASSESSMENT:   Body Structures, Functions, Activity Limitations Requiring Skilled Therapeutic Intervention: Decreased functional mobility ; Decreased ADL status; Decreased safe awareness;Decreased cognition;Decreased balance  Decision Making: Medium Complexity  History: High  Exam: High  Clinical Presentation: Med    Therapy Prognosis: Good  Barriers to Learning: confuseion         DISCHARGE RECOMMENDATIONS:  Discharge Recommendations: Continue to assess pending progress, Patient would benefit from continued therapy after discharge    Assessment: Continued PT indicated to progress mobility and facilitate DC at highest level of indep and safety as well as prevent further functional decline. Rec 24/7 assist at this time. Requires PT Follow-Up: Yes       PLAN OF CARE:  Physcial Therapy Plan  General Plan: 1 time a day 3-6 times a week  Current Treatment Recommendations: Strengthening, Balance training, Functional mobility training, Transfer training, Gait training, Neuromuscular re-education, Manual, Cognitive/Perceptual training, Equipment evaluation, education, & procurement, Patient/Caregiver education & training, Safety education & training    Safety Devices  Type of Devices: All fall risk precautions in place    Goals:  Long Term Goals  Long Term Goal 1: Pt to complete bed mobility with indep  Long Term Goal 2: Pt to complete transfers with indep  Long Term Goal 3: Pt to ambulate 50-150ft with LRD and supervision  Long Term Goal 4: Pt to achieve >42/56 Finch to demo low falls risk    [facility] (6 CLICK) BASIC MOBILITY  AM-PAC Inpatient Mobility Raw Score : 18     Therapy Time:   Individual   Time In 0905   Time Out 0915   Minutes 10           Starr Grams, [address] [place] [address], 01/09/23 at 10:17 AM         Definitions for assistance levels  Independent = pt does not require any physical supervision or assistance from another person for activity completion. Device may be needed.   Stand by assistance = pt requires verbal cues or instructions from another person, close to but not touching, to perform the activity  Minimal assistance= pt performs 75% or more of the activity; assistance is required to complete the activity  Moderate assistance= pt performs 50% of the activity; assistance is required to complete the activity  Maximal assistance = pt performs 25% of the activity; assistance is required to complete the activity  Dependent = pt requires total physical assistance to accomplish the task

## 2023-01-09 NOTE — PROGRESS NOTES
Progress Note  Date:2023       MGWL:[id number]/H724-23  Patient Derian Duarte     YOB: 1940     Age:82 y.o. Subjective      No acute events reported overnight  Infectious work-up negative  Morning labs reassuring. UA negative  CT of without contrast positive only for chronic age-related changes, negative for acute process. Patient admits to some confusion, but denies any acute physical complaints at time of exam.    Unable to complete full standard ROS in setting of baseline dementia. Objective         Vitals Last 24 Hours:  TEMPERATURE:  Temp  Av.6 °F (36.4 °C)  Min: 97.2 °F (36.2 °C)  Max: 98.1 °F (36.7 °C)  RESPIRATIONS RANGE: Resp  Av  Min: 16  Max: 20  PULSE OXIMETRY RANGE: SpO2  Av.8 %  Min: 97 %  Max: 100 %  PULSE RANGE: Pulse  Av.9  Min: 67  Max: 79  BLOOD PRESSURE RANGE: Systolic (68XVV), DHO:232 , Min:113 , GUL:406   ; Diastolic (54BFE), AIE:34, Min:59, Max:87    I/O (24Hr): No intake or output data in the 24 hours ending 23 0750  Objective  Constitutional: Frail elderly female reclining in bed no acute distress. Family present bedside. Head: NCAT  Eyes: PERRLA, EOMI. Left-sided ectropion appreciated. Neck: Trachea midline, phonation intelligible. Cardiovascular: RRR. No significant murmurs appreciated. Warm and well perfused peripherally. No significant pretibial edema. Pulmonary: Normal rate and effort respiration on room air. Grossly CTA B. No coughing during exam.  Abdomen: Soft, nontense, nondistended. Bowel sounds present. Neurologic: Alert, oriented to self and family. Moderately poor historian. Follows all commands. No dysarthria. Psychiatric: Mildly anxious. Impaired attention and recall.       Labs/Imaging/Diagnostics    Labs:  CBC:  Recent Labs     23  1730 23  0610   WBC 7.5 6.4   RBC 4.34 3.83*   HGB 13.2 11.8*   HCT 39.3 34.7*   MCV 90.7 90.6   RDW 14.9* 14.5    313     CHEMISTRIES:  Recent Labs 01/08/23  1813 01/09/23  0610    144   K 3.1* 3.6   CL 99 105   CO2 32* 30   BUN 9 7*   CREATININE 0.67 0.61   GLUCOSE 94 79     PT/INR:No results for input(s): PROTIME, INR in the last 72 hours. APTT:No results for input(s): APTT in the last 72 hours. LIVER PROFILE:  Recent Labs     01/08/23 1813 01/09/23  0610   AST 14 15   ALT 9 8   BILITOT 0.6 0.7   ALKPHOS 105 95       Imaging Last 24 Hours:  CT Head W/O Contrast    Result Date: 1/8/2023  EXAMINATION: CT OF THE HEAD WITHOUT CONTRAST  1/8/2023 5:45 pm TECHNIQUE: CT of the head was performed without the administration of intravenous contrast. Automated exposure control, iterative reconstruction, and/or weight based adjustment of the mA/kV was utilized to reduce the radiation dose to as low as reasonably achievable. COMPARISON: 10/28/2022 HISTORY: ORDERING SYSTEM PROVIDED HISTORY: altered mental status TECHNOLOGIST PROVIDED HISTORY: Reason for exam:->altered mental status Has a \"code stroke\" or \"stroke alert\" been called? ->No Decision Support Exception - unselect if not a suspected or confirmed emergency medical condition->Emergency Medical Condition (MA) What reading provider will be dictating this exam?->CRC FINDINGS: BRAIN/VENTRICLES: There is no acute intracranial hemorrhage, mass effect or midline shift. No abnormal extra-axial fluid collection. The gray-white differentiation is maintained without evidence of an acute infarct. There is no evidence of hydrocephalus. Mild to moderate diffuse brain atrophy is noted. ORBITS: The visualized portion of the orbits demonstrate no acute abnormality. SINUSES: The visualized paranasal sinuses and mastoid air cells demonstrate no acute abnormality. SOFT TISSUES/SKULL:  No acute abnormality of the visualized skull or soft tissues. No acute intracranial abnormality.      Assessment//Plan           Hospital Problems             Last Modified POA    * (Principal) Dementia due to Alzheimer's disease ([facility] [address] 1/8/2023 Yes     Assessment & Plan    Dementia  Paranoid and confused. Alert to self. Needs reassurance that she is not harming herself. She is afraid that she is doing something to to harm herself. Admit  Case management for discharge placement  PT/OT/SLP  Vistaril  Avasis  Bed alarm engaged at all times  Hypokalemia  Potassium 3.1 on presentation  Monitor potassium, maximize electrolytes and replenish to maintain potassium 4.0 to 5.0. Dysphagia  Patient reports having trouble swallowing, tolerated thin liquids with pills   SLP evaluated, recommend Reg diet with Thin liquids  Puritis  Patient itching tops of thighs, small scabs from scratching  Vistaril  Depression  Takes zoloft  Restart home med  Hypertension  SBP often ~170s-180s. Started on low dose lisinopril.       Disposition: CM working on appropriate disposition options    Electronically signed by Kiera Farfan DO on 1/9/23 at 7:50 AM EST

## 2023-01-09 NOTE — PROGRESS NOTES
Comprehensive Nutrition Assessment    Type and Reason for Visit:  Initial, Positive Nutrition Screen    Nutrition Recommendations/Plan:   Continue Current Diet, Start Oral Nutrition Supplement     Malnutrition Assessment:  Malnutrition Status: Moderate malnutrition (01/09/23 1458)    Context:  Social/Environmental Circumstances     Findings of the 6 clinical characteristics of malnutrition:  Energy Intake:  Unable to assess  Weight Loss:  Unable to assess (probable, but unable to confirm source of weight pta)     Body Fat Loss:  Mild body fat loss Orbital, Buccal region   Muscle Mass Loss:  Mild muscle mass loss Temples (temporalis), Clavicles (pectoralis & deltoids)  Fluid Accumulation:  No significant fluid accumulation     Strength:  Not Performed    Nutrition Assessment:    Pt presents with moderate malnutrition related to inadequate oral intake, as evidenced by apparent weight loss noted and as per NFPA. To provide high calorie supplement tid with meals. To continue to monitor. Nutrition Related Findings:    PMH-dementia, CVA; adm with confusion, UTI. Labs/meds reviewed. Pt confused when visited at lunch, but stated she did not want to eat anymore (ate ~25% of meal), but was agreeable to try supplement and stated flavor preference when asked. Wound Type: None       Current Nutrition Intake & Therapies:    Average Meal Intake: 1-25%, 26-50%     ADULT DIET;  Regular    Anthropometric Measures:  Height: 5' 4\" (162.6 cm)  Ideal Body Weight (IBW): 120 lbs (55 kg)    Admission Body Weight: 118 lb (53.5 kg) (stated)  Current Body Weight: 117 lb 6.4 oz (53.3 kg) (1/9),   Weight Source: Bed Scale  Current BMI (kg/m2): 20.1  Usual Body Weight: 130 lb (59 kg) (7/4/22 stated & 3/2021)  % Weight Change (Calculated): -9.7                    BMI Categories: Underweight (BMI less than 22) age over 72    Estimated Daily Nutrient Needs:  Energy Requirements Based On: Kcal/kg  Weight Used for Energy Requirements: Current  Energy (kcal/day): 5254-6486 (kg x 28-30)  Weight Used for Protein Requirements: Current  Protein (g/day): 69-74 (kg x 1.3-1.4)  Method Used for Fluid Requirements: 1 ml/kcal  Fluid (ml/day): ~1500    Nutrition Diagnosis:   Moderate malnutrition, In context of social or environmental circumstances related to inadequate protein-energy intake as evidenced by Criteria as identified in malnutrition assessment  Inadequate oral intake related to cognitive or neurological impairment as evidenced by intake 0-25%, BMI, weight loss    Nutrition Interventions:   Food and/or Nutrient Delivery: Continue Current Diet, Start Oral Nutrition Supplement  Nutrition Education/Counseling: Education not indicated  Coordination of Nutrition Care: Continue to monitor while inpatient       Goals:     Goals: PO intake 50% or greater (weight gain)       Nutrition Monitoring and Evaluation:      Food/Nutrient Intake Outcomes: Food and Nutrient Intake, Supplement Intake  Physical Signs/Symptoms Outcomes: Weight, Biochemical Data    Discharge Planning:      Too soon to determine    Fortino Arreola RD, LD

## 2023-01-09 NOTE — PROGRESS NOTES
[facility] OCCUPATIONAL THERAPY EVALUATION - ACUTE     NAME: Constantine Swenson  : 1940 (79 y.o.)  MRN: [id number]  CODE STATUS: Full Code  Room: X379Y064-41    Date of Service: 2023    Patient Diagnosis(es): Dementia due to Alzheimer's disease ([facility] [G30.9, F02.80]   Patient Active Problem List    Diagnosis Date Noted    Dementia due to Alzheimer's disease ([facility] 2023        Past Medical History:   Diagnosis Date    Cerebral artery occlusion with cerebral infarction ([facility]     Dementia ([facility]      Past Surgical History:   Procedure Laterality Date    ANKLE FRACTURE SURGERY Left     CHOLECYSTECTOMY      TUBAL LIGATION          Restrictions  Restrictions/Precautions: Fall Risk         Other position/activity restrictions: avasys    Safety Devices: Safety Devices  Type of Devices: All fall risk precautions in place;Call light within reach; Left in chair;Chair alarm in place; Telesitter in use     Patient's date of birth confirmed: Yes    General:       Subjective          Pain at start of treatment: No    Pain at end of treatment: No    Prior Level of Function:  Social/Functional History  Lives With: Daughter  Type of Home: House  Home Layout: One level  Home Access: Stairs to enter with rails  Entrance Stairs - Number of Steps: \"some\" unable to provide number  Entrance Stairs - Rails: Right  Bathroom Shower/Tub: Walk-in shower (pt states she sponge baths vs showering)  Home Equipment:  (N/A)  ADL Assistance: [address] [place] [address] Avenue: Independent (pt states she completes light meal prep)  Ambulation Assistance: Independent (pt states furniture walks)  Transfer Assistance: Independent  Additional Comments: questionable historian    OBJECTIVE:     Orientation Status:  Orientation  Overall Orientation Status: Impaired  Orientation Level: Oriented to place;Oriented to person;Disoriented to time;Disoriented to situation    Observation:  Observation/Palpation  Observation: no acute distress noted.  Pt pleasantly confused, cooperative,    Cognition Status:  Cognition  Overall Cognitive Status: Exceptions  Arousal/Alertness: Appropriate responses to stimuli  Following Commands: Follows one step commands consistently  Attention Span: Attends with cues to redirect  Memory: Decreased recall of biographical Information;Decreased short term memory;Decreased recall of recent events;Decreased recall of precautions  Safety Judgement: Decreased awareness of need for safety;Decreased awareness of need for assistance  Problem Solving: Assistance required to generate solutions;Assistance required to identify errors made;Assistance required to implement solutions;Assistance required to correct errors made  Insights: Decreased awareness of deficits  Initiation: Requires cues for some  Sequencing: Requires cues for some    Perception Status:       Vision and Hearing Status:  Hearing  Hearing: Within functional limits        GROSS ASSESSMENT AROM/PROM:  AROM: Within functional limits       ROM:   LUE AROM (degrees)  LUE AROM : WFL  Left Hand AROM (degrees)  Left Hand AROM: WFL  RUE AROM (degrees)  RUE AROM : WFL  Right Hand AROM (degrees)  Right Hand AROM: WFL    UE STRENGTH:  Strength: Generally decreased, functional    UE COORDINATION:  Coordination: Within functional limits    UE TONE:  Tone: Normal    UE SENSATION:  Sensation: Intact    Hand Dominance:  Hand Dominance  Hand Dominance: Right    ADL Status:  ADL  Feeding: Thickened liquids; Supervision  Grooming: Stand by assistance  UE Bathing: Stand by assistance  LE Bathing: Minimal assistance  UE Dressing: Stand by assistance  LE Dressing: Minimal assistance  Toileting: Minimal assistance  Additional Comments: simulated ADL seated in bedside chair.  Anticipate increased time and frequent verbal cues due to cognition  Toilet Transfers  Toilet Transfer: Stand by assistance  Toilet Transfers Comments: anticipated level    Functional Mobility:    Transfers  Sit to stand: Stand by assistance  Stand to sit: Stand by assistance    Patient ambulated  5ft  with Handheld assist at SBA level. Bed Mobility  Bed mobility  Bed Mobility Comments: NT -  pt up in chair    Seated and Standing Balance:  Balance  Sitting: Intact  Standing: Impaired (CGA)    Functional Endurance:  Activity Tolerance  Activity Tolerance: Treatment limited secondary to decreased cognition    D/C Recommendations:  OT D/C RECOMMENDATIONS  REQUIRES OT FOLLOW-UP: Yes    Equipment Recommendations:  OT Equipment Recommendations  Other: continue to assess    OT Education:   Patient Education  Education Given To: Patient  Education Provided: Role of Therapy;Plan of Care  Barriers to Learning: Cognition  Education Outcome: Continued education needed    OT Follow Up:   OT D/C RECOMMENDATIONS  REQUIRES OT FOLLOW-UP: Yes       Assessment/Discharge Disposition:  Assessment: Pt is an 79 yr old female presenting to [facility] with the above functional deficits. Pt would benefit from occupational therapy services to maximize safety and independence with ADL tasks, improve overall strength/endurance and balance for functional tasks.   Performance deficits / Impairments: Decreased functional mobility , Decreased safe awareness, Decreased balance, Decreased ADL status, Decreased cognition, Decreased endurance, Decreased strength  Prognosis: Fair  Discharge Recommendations: Continue to assess pending progress  Decision Making: Medium Complexity  History: min  Exam: 7 perf deficits  Assistance / Modification: Vadim    [facility]) Self care Score   How much help for putting on and taking off regular lower body clothing?: A Little  How much help for Bathing?: A Little  How much help for Toileting?: A Little  How much help for putting on and taking off regular upper body clothing?: A Little  How much help for taking care of personal grooming?: A Little  How much help for eating meals?: A Little  AM-PAC Inpatient Daily Activity Raw Score: 18  AM-PAC Inpatient ADL T-Scale Score : 38.66  ADL Inpatient CMS 0-100% Score: 46.65    Therapy key for assistance levels -   Independent/Mod I = Pt. is able to perform task with no assistance but may require a device   Stand by assistance = Pt. does not perform task at an independent level but does not need physical assistance, requires verbal cues  Minimal, Moderate, Maximal Assistance = Pt. requires physical assistance (25%, 50%, 75% assist from helper) for task but is able to actively participate in task   Dependent = Pt. requires total assistance with task and is not able to actively participate with task completion     Plan:  Occupational Therapy Plan  Times Per Week: 1-4x/wk  Therapy Duration:  (length of acute stay)  Current Treatment Recommendations: Strengthening, Balance training, Functional mobility training, Endurance training, Equipment evaluation, education, & procurement, Patient/Caregiver education & training, Cognitive reorientation, Self-Care / ADL, Home management training, Safety education & training, Cognitive/Perceptual training    Goals:   Patient will:    - Improve functional endurance to tolerate/complete 30 mins of ADL's  - Be SUP in UB ADLs   - Be SUP in LB ADLs  - Be SUP in ADL transfers without LOB  - Be SUP in toileting tasks  - Improve B UE strength and endurance to 5/5 in order to participate in self-care activities as projected. - Access appropriate D/C site with as few architectural barriers as possible.     Patient Goal: Patient goals : to get better     Discussed and agreed upon: Yes Comments:       Therapy Time:   Individual   Time In 1108   Time Out 1119   Minutes 11          Eval: 11 minutes     Electronically signed by:    Brock Vo OT,   1/9/2023, 11:49 AM

## 2023-01-09 NOTE — CARE COORDINATION
Diana Solomon RN nursing supervisor informed pt will need avasys or 1:1. Pt only oriented x 1 and can become easily agitated . Dtr has concerns that pt is reporting having visual complaints recently, this was reported to Hakeem HORTA.  Electronically signed by Saulo Huerta RN on 1/8/2023 at 7:11 PM

## 2023-01-09 NOTE — ED NOTES
Pt stable, a&ox1-2, skin w/d/pink, 0 distress, 0 sob, 0 n&v, pt NSR on monitor. Pt's daughter at bedside.      Francois Hurst RN  01/08/23 2050

## 2023-01-09 NOTE — PROGRESS NOTES
[facility]   Facility/Department: [facility]  Speech Language Pathology  Clinical Bedside Swallow Evaluation    NAME:Rayna Hurst  : 1940 (79 y.o.)   [x]   confirmed    MRN: [id number]  [facility]  ADMISSION DATE: 2023  PATIENT DIAGNOSIS(ES): Dementia due to Alzheimer's disease ([facility] [address] [G30.9, F02.80]  Chief Complaint   Patient presents with    Altered Mental Status     Per daughter patient has had some paranoia and confusion     Patient Active Problem List    Diagnosis Date Noted    D[facility] 2023     Past Medical History:   Diagnosis Date    Cerebral artery occlusion with cerebral infarction ([facility]     Dementia ([facility]      Past Surgical History:   Procedure Laterality Date    ANKLE FRACTURE SURGERY Left     CHOLECYSTECTOMY      TUBAL LIGATION       Allergies   Allergen Reactions    Nuts [Peanut-Containing Drug Products] Hives     Brazil nuts only    Shellfish-Derived Products Hives       DATE ONSET: 23    Date of Evaluation: 2023   Evaluating Therapist: Shelley Murcia, SLP    Dysphagia Diagnosis  Dysphagia Diagnosis: Swallow function appears WFL  Dysphagia Impression : Pt p/w oral and pharyngeal phase of swallow WFL. No overt s/s of aspiration noted with all consistencies. During solid trials pt reported sometimes it \"feels weird in my mouth\". Pt had no oral residue noted during trials. Recommended Diet     Diet Solids Recommendation: Regular  Liquid Consistency Recommendation: Thin  Recommended Form of Meds: Whole with water  Compensatory Swallowing Strategies :  Alternate solids and liquids;Small bites/sips;Eat/Feed slowly;Upright as possible for all oral intake      Reason for Referral  Yehuda Kauffman was referred for a bedside swallow evaluation to assess the efficiency of her swallow function, identify signs and symptoms of aspiration, identify risk factors, and make recommendations regarding safe dietary consistencies, effective compensatory strategies, and safe eating environment. General  Chart Reviewed: Yes  Behavior/Cognition: Alert; Cooperative;Confused  Temperature Spikes Noted: No  Respiratory Status: Room air  O2 Device: None (Room air)  Communication Observation: Functional  Follows Directions: Simple  Dentition: Dentures top;Dentures bottom  Patient Positioning: Upright in chair  Baseline Vocal Quality: Normal  Consistencies Administered: Regular; Soft and Bite-Sized;Pureed; Thin    Vision and Hearing  Vision  Vision: Impaired  Hearing  Hearing: Within functional limits    Current Diet level  Current Diet : Regular  Current Liquid Diet : Mildly Thick    Oral Motor  Labial: No impairment  Dentition: Upper & lower dentures  Oral Hygiene: Moist;Clean  Lingual: No impairment  Velum: No Impairment  Mandible: No impairment    Oral/Pharyngeal Phase  Oral Phase - Comment: Oral phase WFL  Pharyngeal Phase: Pharyngeal phase WFL observed via palpation and observation    PO Trials  Neuromuscular Estim Used: No  Assessment Method(s): Observation;Palpation  Vocal Quality: No Impairment  Consistency Presented: All consistencies  How Presented: Self-fed/presented  How much presented: 3  Bolus Acceptance: No impairment  Bolus Formation/Control: No impairment  Propulsion: No impairment  Oral Residue: None (pt reports it feels weird in her mouth)  Initiation of Swallow: No impairment  Laryngeal Elevation: Functional  Aspiration Signs/Symptoms: None  Pharyngeal Phase Characteristics: No impairment, issues, or problems  Effective Modifications: Alternate liquids/solids;Small sips and bites                                  Dysphagia Diagnosis  Dysphagia Diagnosis: Swallow function appears WFL  Dysphagia Impression : Pt p/w oral and pharyngeal phase of swallow WFL. No overt s/s of aspiration noted with all consistencies. During solid trials pt reported sometimes it \"feels weird in my mouth\". Pt had no oral residue noted during trials.   Dysphagia Outcome Severity Scale: Level 6: Within functional limits/Modified independence     Recommendations  Requires SLP Intervention: No  D/C Recommendations: No follow up therapy recommended post discharge  Diet Solids Recommendation: Regular  Liquid Consistency Recommendation: Thin  Compensatory Swallowing Strategies : Alternate solids and liquids;Small bites/sips;Eat/Feed slowly;Upright as possible for all oral intake  Recommended Form of Meds: Whole with water  Duration of Treatment: No f/u  Frequency of Treatment: No f/u    Prognosis  Speech Therapy Prognosis  Prognosis Considerations: Age;Previous Level of Function    Education  Individuals consulted  Consulted and agree with results and recommendations: Patient;RN  RN Name: Doug Neri in place: Yes  Type of devices: Call light within reach; Chair alarm in place; Telesitter in use    Pain Assessment  Patient does not c/o pain. Pain Re-assessment  Patient does not c/o pain.       Therapy Time   Time in: 0930  Time out: 0945  Minutes: 15              Signature: Electronically signed by HUDSON Jaramillo on 1/9/2023 at 11:14 AM

## 2023-01-10 LAB
ALBUMIN SERPL-MCNC: 2.8 G/DL (ref 3.5–4.6)
ALP BLD-CCNC: 95 U/L (ref 40–130)
ALT SERPL-CCNC: 8 U/L (ref 0–33)
ANION GAP SERPL CALCULATED.3IONS-SCNC: 10 MEQ/L (ref 9–15)
AST SERPL-CCNC: 16 U/L (ref 0–35)
BASOPHILS ABSOLUTE: 0 K/UL (ref 0–0.2)
BASOPHILS RELATIVE PERCENT: 0.5 %
BILIRUB SERPL-MCNC: 0.6 MG/DL (ref 0.2–0.7)
BUN BLDV-MCNC: 8 MG/DL (ref 8–23)
CALCIUM SERPL-MCNC: 8.4 MG/DL (ref 8.5–9.9)
CHLORIDE BLD-SCNC: 98 MEQ/L (ref 95–107)
CO2: 32 MEQ/L (ref 20–31)
CREAT SERPL-MCNC: 0.72 MG/DL (ref 0.5–0.9)
EOSINOPHILS ABSOLUTE: 0.6 K/UL (ref 0–0.7)
EOSINOPHILS RELATIVE PERCENT: 8.1 %
GFR SERPL CREATININE-BSD FRML MDRD: >60 ML/MIN/{1.73_M2}
GLOBULIN: 2.6 G/DL (ref 2.3–3.5)
GLUCOSE BLD-MCNC: 79 MG/DL (ref 70–99)
HCT VFR BLD CALC: 36.4 % (ref 37–47)
HEMOGLOBIN: 12.2 G/DL (ref 12–16)
LYMPHOCYTES ABSOLUTE: 1.2 K/UL (ref 1–4.8)
LYMPHOCYTES RELATIVE PERCENT: 15.9 %
MCH RBC QN AUTO: 30.3 PG (ref 27–31.3)
MCHC RBC AUTO-ENTMCNC: 33.6 % (ref 33–37)
MCV RBC AUTO: 90.2 FL (ref 79.4–94.8)
MONOCYTES ABSOLUTE: 0.7 K/UL (ref 0.2–0.8)
MONOCYTES RELATIVE PERCENT: 9.7 %
NEUTROPHILS ABSOLUTE: 5.1 K/UL (ref 1.4–6.5)
NEUTROPHILS RELATIVE PERCENT: 65.8 %
PDW BLD-RTO: 14.8 % (ref 11.5–14.5)
PLATELET # BLD: 341 K/UL (ref 130–400)
POTASSIUM REFLEX MAGNESIUM: 3.6 MEQ/L (ref 3.4–4.9)
RBC # BLD: 4.04 M/UL (ref 4.2–5.4)
SODIUM BLD-SCNC: 140 MEQ/L (ref 135–144)
TOTAL PROTEIN: 5.4 G/DL (ref 6.3–8)
WBC # BLD: 7.7 K/UL (ref 4.8–10.8)

## 2023-01-10 PROCEDURE — 36415 COLL VENOUS BLD VENIPUNCTURE: CPT

## 2023-01-10 PROCEDURE — 6370000000 HC RX 637 (ALT 250 FOR IP): Performed by: INTERNAL MEDICINE

## 2023-01-10 PROCEDURE — 97116 GAIT TRAINING THERAPY: CPT

## 2023-01-10 PROCEDURE — 80053 COMPREHEN METABOLIC PANEL: CPT

## 2023-01-10 PROCEDURE — 6360000002 HC RX W HCPCS

## 2023-01-10 PROCEDURE — 97129 THER IVNTJ 1ST 15 MIN: CPT

## 2023-01-10 PROCEDURE — G0378 HOSPITAL OBSERVATION PER HR: HCPCS

## 2023-01-10 PROCEDURE — 85025 COMPLETE CBC W/AUTO DIFF WBC: CPT

## 2023-01-10 PROCEDURE — 6370000000 HC RX 637 (ALT 250 FOR IP)

## 2023-01-10 PROCEDURE — 96372 THER/PROPH/DIAG INJ SC/IM: CPT

## 2023-01-10 RX ORDER — LISINOPRIL 5 MG/1
5 TABLET ORAL DAILY
Qty: 30 TABLET | Refills: 3 | Status: SHIPPED | OUTPATIENT
Start: 2023-01-11

## 2023-01-10 RX ADMIN — LISINOPRIL 5 MG: 5 TABLET ORAL at 07:58

## 2023-01-10 RX ADMIN — Medication 3 MG: at 21:50

## 2023-01-10 RX ADMIN — Medication 3 MG: at 01:14

## 2023-01-10 RX ADMIN — HYDROXYZINE PAMOATE 25 MG: 25 CAPSULE ORAL at 14:21

## 2023-01-10 RX ADMIN — Medication 1 TABLET: at 07:58

## 2023-01-10 RX ADMIN — SERTRALINE HYDROCHLORIDE 25 MG: 25 TABLET ORAL at 07:58

## 2023-01-10 RX ADMIN — ENOXAPARIN SODIUM 40 MG: 100 INJECTION SUBCUTANEOUS at 07:58

## 2023-01-10 NOTE — PROGRESS NOTES
Progress Note  Date:1/10/2023       SQLN:[id number]  Patient Krystina Donovan     YOB: 1940     Age:82 y.o. Subjective      No acute events reported overnight  Infectious work-up negative  Morning labs again reassuring. Avoidable inpatient days ongoing, pending placement. Objective         Vitals Last 24 Hours:  TEMPERATURE:  Temp  Av.1 °F (36.7 °C)  Min: 97.9 °F (36.6 °C)  Max: 98.2 °F (36.8 °C)  RESPIRATIONS RANGE: Resp  Av  Min: 18  Max: 18  PULSE OXIMETRY RANGE: SpO2  Av.5 %  Min: 99 %  Max: 100 %  PULSE RANGE: Pulse  Av.3  Min: 69  Max: 78  BLOOD PRESSURE RANGE: Systolic (08WTN), VFA:900 , Min:113 , JB   ; Diastolic (46GQK), FEJ:27, Min:51, Max:76    I/O (24Hr): No intake or output data in the 24 hours ending 01/10/23 1258  Objective:  Vital signs: (most recent): Blood pressure 113/73, pulse 78, temperature 97.9 °F (36.6 °C), temperature source Axillary, resp. rate 18, height 5' 4\" (1.626 m), weight 117 lb (53.1 kg), SpO2 99 %. Constitutional: Frail elderly female reclining in bed no acute distress. Head: NCAT  Eyes: PERRLA, EOMI. Left-sided ectropion appreciated. Neck: Trachea midline, phonation intelligible. Cardiovascular: RRR. No significant murmurs appreciated. Warm and well perfused peripherally. No significant pretibial edema. Pulmonary: Normal rate and effort respiration on room air. Grossly CTAB. No coughing during exam.  Abdomen: Soft, nontense, nondistended. Bowel sounds present. Neurologic: Alert, oriented to self. Moderately poor historian. Follows all commands. No dysarthria. Psychiatric: Mildly anxious. Impaired attention and recall.       Labs/Imaging/Diagnostics    Labs:  CBC:  Recent Labs     23  1730 23  0610 01/10/23  0456   WBC 7.5 6.4 7.7   RBC 4.34 3.83* 4.04*   HGB 13.2 11.8* 12.2   HCT 39.3 34.7* 36.4*   MCV 90.7 90.6 90.2   RDW 14.9* 14.5 14.8*    313 341       CHEMISTRIES:  Recent Labs 01/08/23 1813 01/09/23 0610 01/10/23  0456    144 140   K 3.1* 3.6 3.6   CL 99 105 98   CO2 32* 30 32*   BUN 9 7* 8   CREATININE 0.67 0.61 0.72   GLUCOSE 94 79 79       PT/INR:No results for input(s): PROTIME, INR in the last 72 hours. APTT:No results for input(s): APTT in the last 72 hours. LIVER PROFILE:  Recent Labs     01/08/23 1813 01/09/23 0610 01/10/23  0456   AST 14 15 16   ALT 9 8 8   BILITOT 0.6 0.7 0.6   ALKPHOS 105 95 95         Imaging Last 24 Hours:  CT Head W/O Contrast    Result Date: 1/8/2023  EXAMINATION: CT OF THE HEAD WITHOUT CONTRAST  1/8/2023 5:45 pm TECHNIQUE: CT of the head was performed without the administration of intravenous contrast. Automated exposure control, iterative reconstruction, and/or weight based adjustment of the mA/kV was utilized to reduce the radiation dose to as low as reasonably achievable. COMPARISON: 10/28/2022 HISTORY: ORDERING SYSTEM PROVIDED HISTORY: altered mental status TECHNOLOGIST PROVIDED HISTORY: Reason for exam:->altered mental status Has a \"code stroke\" or \"stroke alert\" been called? ->No Decision Support Exception - unselect if not a suspected or confirmed emergency medical condition->Emergency Medical Condition (MA) What reading provider will be dictating this exam?->CRC FINDINGS: BRAIN/VENTRICLES: There is no acute intracranial hemorrhage, mass effect or midline shift. No abnormal extra-axial fluid collection. The gray-white differentiation is maintained without evidence of an acute infarct. There is no evidence of hydrocephalus. Mild to moderate diffuse brain atrophy is noted. ORBITS: The visualized portion of the orbits demonstrate no acute abnormality. SINUSES: The visualized paranasal sinuses and mastoid air cells demonstrate no acute abnormality. SOFT TISSUES/SKULL:  No acute abnormality of the visualized skull or soft tissues. No acute intracranial abnormality.      Assessment//Plan           Hospital Problems             Last Modified POA    * (Principal) Dementia due to Alzheimer's disease ([facility] [address] [facility] 1/8/2023 Yes   Assessment & Plan    Dementia  Paranoid and confused. Alert to self. Needs reassurance that she is not harming herself. She is afraid that she is doing something to to harm herself. Admit  Case management for discharge placement  PT/OT/SLP  Vistaril  Avasis  Bed alarm engaged at all times  Hypokalemia  Potassium 3.1 on presentation  Monitor potassium, maximize electrolytes and replenish to maintain potassium 4.0 to 5.0. Dysphagia  Patient reports having trouble swallowing, tolerated thin liquids with pills   SLP evaluated, recommend Reg diet with Thin liquids  Puritis  Patient itching tops of thighs, small scabs from scratching  Vistaril  Depression  Takes zoloft  Restart home med  Hypertension  SBP often ~170s-180s. Started on low dose lisinopril. Disposition: CM working on appropriate disposition options. Avoidable inpatient days ongoing, pending placement.       Electronically signed by Gail Cobb DO on 1/10/23

## 2023-01-10 NOTE — PLAN OF CARE
Problem: Skin/Tissue Integrity  Goal: Absence of new skin breakdown  Description: 1. Monitor for areas of redness and/or skin breakdown  2. Assess vascular access sites hourly  3. Every 4-6 hours minimum:  Change oxygen saturation probe site  4. Every 4-6 hours:  If on nasal continuous positive airway pressure, respiratory therapy assess nares and determine need for appliance change or resting period. Outcome: Progressing     Problem: Safety - Adult  Goal: Free from fall injury  Outcome: Progressing     Problem: Confusion  Goal: Confusion, delirium, dementia, or psychosis is improved or at baseline  Description: INTERVENTIONS:  1. Assess for possible contributors to thought disturbance, including medications, impaired vision or hearing, underlying metabolic abnormalities, dehydration, psychiatric diagnoses, and notify attending LIP  2. [place] high risk fall precautions, as indicated  3. Provide frequent short contacts to provide reality reorientation, refocusing and direction  4. Decrease environmental stimuli, including noise as appropriate  5. Monitor and intervene to maintain adequate nutrition, hydration, elimination, sleep and activity  6. If unable to ensure safety without constant attention obtain sitter and review sitter guidelines with assigned personnel  7. Initiate Psychosocial CNS and Spiritual Care consult, as indicated  Outcome: Progressing     Problem: Chronic Conditions and Co-morbidities  Goal: Patient's chronic conditions and co-morbidity symptoms are monitored and maintained or improved  Outcome: Progressing     Problem: SLP Adult - Impaired Communication  Goal: By Discharge: Demonstrates communication skills at highest level of function for planned discharge setting. See evaluation for individualized goals.   1/9/2023 1115 by HUDSON Mckenzie  Outcome: Progressing     Problem: SLP Adult - Disturbed Thought Process  Goal: By Discharge: Demonstrates cognitive skills at highest level of function for planned discharge setting. See evaluation for individualized goals. 1/9/2023 1115 by Enrique Pantoja, SLP  Outcome: Progressing     Problem: Occupational Therapy - Adult  Goal: By Discharge: Performs self-care activities at highest level of function for planned discharge setting. See evaluation for individualized goals.   1/9/2023 1151 by Fabiola Olson OT  Outcome: Progressing     Problem: Nutrition Deficit:  Goal: Optimize nutritional status  1/10/2023 0059 by Lori Wright RN  Outcome: Progressing  1/9/2023 1506 by Negro Lopez RD, LD  Flowsheets (Taken 1/9/2023 1506)  Nutrient intake appropriate for improving, restoring, or maintaining nutritional needs:   Assess nutritional status and recommend course of action   Monitor oral intake, labs, and treatment plans   Recommend appropriate diets, oral nutritional supplements, and vitamin/mineral supplements     Problem: Neurosensory - Adult  Goal: Achieves stable or improved neurological status  Outcome: Progressing  Goal: Absence of seizures  Outcome: Progressing  Goal: Remains free of injury related to seizures activity  Outcome: Progressing  Goal: Achieves maximal functionality and self care  Outcome: Progressing     Problem: Musculoskeletal - Adult  Goal: Return mobility to safest level of function  Outcome: Progressing  Goal: Maintain proper alignment of affected body part  Outcome: Progressing  Goal: Return ADL status to a safe level of function  Outcome: Progressing     Problem: Genitourinary - Adult  Goal: Absence of urinary retention  Outcome: Progressing  Goal: Urinary catheter remains patent  Outcome: Progressing

## 2023-01-10 NOTE — PROGRESS NOTES
Patient resting in bed comfortably, avasys in place for pt safety  Pt complaining of itching to the upper thighs- medicated with PRN vistaril as ordered  Pt up to the bathroom with the walker as a contact guard assist. Pt ambulating in bis[place] today with walker with RN as contact guard assist.

## 2023-01-10 NOTE — PROGRESS NOTES
Physical Therapy Med Surg Daily Treatment Note  Facility/Department: [facility]  Room: J519/L019-31       NAME: Natasha Wayne  : 1940 (79 y.o.)  MRN: [id number]  CODE STATUS: Full Code    Date of Service: 1/10/2023    Patient Diagnosis(es): Dementia due to Alzheimer's disease ([facility] [G30.9, F02.80]   Chief Complaint   Patient presents with    Altered Mental Status     Per daughter patient has had some paranoia and confusion     Patient Active Problem List    Diagnosis Date Noted    Dementia due to Alzheimer's disease ([facility] 2023        Past Medical History:   Diagnosis Date    Cerebral artery occlusion with cerebral infarction ([facility]     Dementia ([facility]      Past Surgical History:   Procedure Laterality Date    ANKLE FRACTURE SURGERY Left     CHOLECYSTECTOMY      TUBAL LIGATION         Chart Reviewed: Yes  Diagnosis: Dementia due to Alzheimer's disease ([facility]    Restrictions:  Restrictions/Precautions: Fall Risk  Position Activity Restriction  Other position/activity restrictions: avasys    SUBJECTIVE:   Subjective: I don't know if I should get out of bed. Pain  Pain: Denies pre and post session pain. OBJECTIVE:   Orientation  Overall Orientation Status: Impaired  Orientation Level: Oriented to place;Oriented to person;Disoriented to time;Disoriented to situation  Cognition  Overall Cognitive Status: Exceptions  Arousal/Alertness: Appropriate responses to stimuli  Following Commands:  Follows one step commands consistently  Attention Span: Attends with cues to redirect  Memory: Decreased recall of biographical Information;Decreased short term memory;Decreased recall of recent events;Decreased recall of precautions  Safety Judgement: Decreased awareness of need for safety;Decreased awareness of need for assistance  Problem Solving: Assistance required to generate solutions;Assistance required to identify errors made;Assistance required to implement solutions;Assistance required to correct errors made  Insights: Decreased awareness of deficits  Initiation: Requires cues for some  Sequencing: Requires cues for some    Bed mobility  Supine to Sit: Supervision  Bed Mobility Comments: bed flat with occasional use of hand rails; vc's for technique;    Transfers  Sit to Stand: Supervision  Stand to Sit: Supervision  Bed to Chair: Supervision  Comment: vc's for hand placement and technique; Pt required increased cues for safety. (Not getting up on her own)    Ambulation  Surface: Level tile  Device: No Device  Assistance: Stand by assistance  Quality of Gait: inconsistent pattern and frequent deviation from straight path. Requires verbal cues for direction and attention to task. Distance: 100'                              Activity Tolerance  Activity Tolerance: Patient tolerated treatment well          ASSESSMENT   Assessment: Pt required increased cuing for participation and safety. Pt ambulated without device and slow ira. Unable to perform MERINO balance test as pt was having difficulty follow instruction. Discharge Recommendations:  Continue to assess pending progress, Patient would benefit from continued therapy after discharge         Goals  Long Term Goals  Long Term Goal 1: Pt to complete bed mobility with indep  Long Term Goal 2: Pt to complete transfers with indep  Long Term Goal 3: Pt to ambulate 50-150ft with LRD and supervision  Long Term Goal 4: Pt to achieve >42/56 Merino to demo low falls risk    PLAN    General Plan: 1 time a day 3-6 times a week  Safety Devices  Type of Devices:  All fall risk precautions in place, Call light within reach, Left in chair, Chair alarm in place, Telesitter in use     [facility] (6 CLICK) [facility] Inpatient Mobility Raw Score : 18      Therapy Time   Individual   Time In 0950   Time Out 1011   Minutes 21      Bm/Trsf - 8 mins  Gait - 13 mins       Tyrone Humphreys PTA, 01/10/23 at 10:18 AM         Definitions for assistance levels  Independent = pt does not require any physical supervision or assistance from another person for activity completion. Device may be needed.   Stand by assistance = pt requires verbal cues or instructions from another person, close to but not touching, to perform the activity  Minimal assistance= pt performs 75% or more of the activity; assistance is required to complete the activity  Moderate assistance= pt performs 50% of the activity; assistance is required to complete the activity  Maximal assistance = pt performs 25% of the activity; assistance is required to complete the activity  Dependent = pt requires total physical assistance to accomplish the task

## 2023-01-10 NOTE — CARE COORDINATION
CALL PLACED TO DTR TO DISCUSS DISCHARGE DISPOSITION WITH VM MESSAGE LEFT. PT RECOMMENDS SNF. WILL FOLLOW.

## 2023-01-10 NOTE — CARE COORDINATION
LSW spoke with pt's daughter May Alvarado and she confirmed that [id number] would be top choices. LSW sent referral to both.

## 2023-01-10 NOTE — DISCHARGE SUMMARY
Discharge Summary    Date: 1/10/2023  Patient Name: Patel Perales    YOB: 1940     Age: 79 y.o. Admit Date: 1/8/2023  Discharge Date: 1/10/2023  Discharge Condition: [address]    Admission Diagnosis  [facility] [G30.9, F02.80]      Discharge Diagnosis  Principal Problem:    Dementia due to Alzheimer's disease ([facility]  Resolved Problems:    * No resolved hospital problems. [facility]  Narrative of Hospital Course:  Patient is an 79-year-old female brought in by family from home with complaints of increased confusion superimposed on baseline dementia, paranoia, falls, some choking on food, and generalized physical deconditioning. Infectious work-up was unremarkable. Vital signs were essentially WNL on presentation. Some mild hypertension following admission was successfully treated with very low dose ACE inhibitor. Overall presentation most consistent with progression of dementia. Family feeling they can no longer adequately provide for all patient's cares at home at this time. Therapy services consulted, made recommendations for rehab/SNF on discharge. Patient deemed sufficiently improved and medically appropriate for discharge to SNF/rehab as of 1/10/2023. Consultants:  IP CONSULT TO CASE MANAGEMENT    Surgeries/procedures Performed:      Treatments:            Discharge Plan/Disposition:  To [facility]/Incidental Findings Requiring Follow Up:    Patient Instructions:    Diet:    Activity:  For number of days (if applicable): Other Instructions:    Provider Follow-Up:   No follow-ups on file.      Significant Diagnostic Studies:    Recent Labs:  Admission on 01/08/2023  WBC                                           Date: 01/08/2023  Value: 7.5         Ref range: 4.8 - 10.8 K/uL    Status: Final  RBC                                           Date: 01/08/2023  Value: 4.34        Ref range: 4.20 - 5.40 M/uL   Status: Final  Hemoglobin                                    Date: 01/08/2023  Value: 13.2        Ref range: 12.0 - 16.0 g/dL   Status: Final  Hematocrit                                    Date: 01/08/2023  Value: 39.3        Ref range: 37.0 - 47.0 %      Status: Final  MCV                                           Date: 01/08/2023  Value: 90.7        Ref range: 79.4 - 94.8 fL     Status: Final  MCH                                           Date: 01/08/2023  Value: 30.4        Ref range: 27.0 - 31.3 pg     Status: Final  MCHC                                          Date: 01/08/2023  Value: 33.5        Ref range: 33.0 - 37.0 %      Status: Final  RDW                                           Date: 01/08/2023  Value: 14.9 (A)    Ref range: 11.5 - 14.5 %      Status: Final  Platelets                                     Date: 01/08/2023  Value: 345         Ref range: 130 - 400 K/uL     Status: Final  Neutrophils %                                 Date: 01/08/2023  Value: 74.3        Ref range: %                  Status: Final  Lymphocytes %                                 Date: 01/08/2023  Value: 11.8        Ref range: %                  Status: Final  Monocytes %                                   Date: 01/08/2023  Value: 6.8         Ref range: %                  Status: Final  Eosinophils %                                 Date: 01/08/2023  Value: 6.8         Ref range: %                  Status: Final  Basophils %                                   Date: 01/08/2023  Value: 0.3         Ref range: %                  Status: Final  Neutrophils Absolute                          Date: 01/08/2023  Value: 5.6         Ref range: 1.4 - 6.5 K/uL     Status: Final  Lymphocytes Absolute                          Date: 01/08/2023  Value: 0.9 (A)     Ref range: 1.0 - 4.8 K/uL     Status: Final  Monocytes Absolute                            Date: 01/08/2023  Value: 0.5         Ref range: 0.2 - 0.8 K/uL     Status: Final  Eosinophils Absolute Date: 01/08/2023  Value: 0.5         Ref range: 0.0 - 0.7 K/uL     Status: Final  Basophils Absolute                            Date: 01/08/2023  Value: 0.0         Ref range: 0.0 - 0.2 K/uL     Status: Final  SARS-CoV-2, NAAT                              Date: 01/08/2023  Value: Not Detected                     Ref range: Not Detected       Status: Final                Comment: Rapid NAAT:   Negative results should be treated as presumptive and,  if inconsistent with clinical signs and symptoms or necessary for  patient management, should be tested with an alternative molecular  assay. Negative results do not preclude SARS-CoV-2 infection and  should not be used as the sole basis for patient management decisions. This test has been authorized by the FDA under an Emergency Use  Authorization (EUA) for use by authorized laboratories.     Fact sheet for Healthcare Providers:  http://www.mel.harvey/  Fact sheet for Patients: http://www.mel.harvey/    METHODOLOGY: Isothermal Nucleic Acid Amplification    Ventricular Rate                              Date: 01/08/2023  Value: 49          Ref range: BPM                Status: Final  Atrial Rate                                   Date: 01/08/2023  Value: 49          Ref range: BPM                Status: Final  P-R Interval                                  Date: 01/08/2023  Value: 128         Ref range: ms                 Status: Final  QRS Duration                                  Date: 01/08/2023  Value: 84          Ref range: ms                 Status: Final  Q-T Interval                                  Date: 01/08/2023  Value: 446         Ref range: ms                 Status: Final  QTc Calculation (Bazett)                      Date: 01/08/2023  Value: 402         Ref range: ms                 Status: Final  P Axis                                        Date: 01/08/2023  Value: 101         Ref range: degrees Status: Final  R Axis                                        Date: 01/08/2023  Value: 40          Ref range: degrees            Status: Final  T Axis                                        Date: 01/08/2023  Value: 40          Ref range: degrees            Status: Final  Ethanol Lvl                                   Date: 01/08/2023  Value: <10         Ref range: mg/dL              Status: Final  Ethanol percent                               Date: 01/08/2023  Value: Not indicated                     Ref range: G/dL               Status: Final  Color, UA                                     Date: 01/08/2023  Value: Yellow      Ref range: Straw/Yellow       Status: Final  Clarity, UA                                   Date: 01/08/2023  Value: Clear       Ref range: Clear              Status: Final  Glucose, Ur                                   Date: 01/08/2023  Value: Negative    Ref range: Negative mg/dL     Status: Final  Bilirubin Urine                               Date: 01/08/2023  Value: Negative    Ref range: Negative           Status: Final  Ketones, Urine                                Date: 01/08/2023  Value: Negative    Ref range: Negative mg/dL     Status: Final  Specific [place], UA                          Date: 01/08/2023  Value: 1.012       Ref range: 1.005 - 1.030      Status: Final  Blood, Urine                                  Date: 01/08/2023  Value: Negative    Ref range: Negative           Status: Final  pH, UA                                        Date: 01/08/2023  Value: 7.0         Ref range: 5.0 - 9.0          Status: Final  Protein, UA                                   Date: 01/08/2023  Value: Negative    Ref range: Negative mg/dL     Status: Final  Urobilinogen, Urine                           Date: 01/08/2023  Value: 0.2         Ref range: <2.0 E.U./dL       Status: Final  Nitrite, Urine                                Date: 01/08/2023  Value: Negative    Ref range: Negative Status: Final  Leukocyte Esterase, Urine                     Date: 01/08/2023  Value: TRACE (A)   Ref range: Negative           Status: Final  Urine Reflex to Culture                       Date: 01/08/2023  Value: Not Indicated                       Status: Final  Amphetamine Screen, Urine                     Date: 01/08/2023  Value: Neg         Ref range: Negative <1000 n*  Status: Final  Barbiturate Screen, Ur                        Date: 01/08/2023  Value: Neg         Ref range: Negative < 200 n*  Status: Final  Benzodiazepine Screen, Urine                  Date: 01/08/2023  Value: Neg         Ref range: Negative < 200 n*  Status: Final  Cannabinoid Scrn, Ur                          Date: 01/08/2023  Value: Neg         Ref range: Negative < 50 ng*  Status: Final  Cocaine Metabolite Screen, Urine              Date: 01/08/2023  Value: Neg         Ref range: Negative < 300 n*  Status: Final  Opiate Scrn, Ur                               Date: 01/08/2023  Value: Neg         Ref range: Negative < 300 n*  Status: Final  PCP Screen, Urine                             Date: 01/08/2023  Value: Neg         Ref range: Negative < 25 ng*  Status: Final  Methadone Screen, Urine                       Date: 01/08/2023  Value: Neg         Ref range: Negative <300 ng*  Status: Final  Propoxyphene Scrn, Ur                         Date: 01/08/2023  Value: Neg         Ref range: Negative <300 ng*  Status: Final  Oxycodone Urine                               Date: 01/08/2023  Value: Neg         Ref range: Negative <100 ng*  Status: Final  FENTANYL SCREEN, URINE                        Date: 01/08/2023  Value: Neg         Ref range: Negative < 50 ng*  Status: Final  Drug Screen Comment:                          Date: 01/08/2023  Value: see below     Status: Final                Comment: This method is a screening test to detect only these drug  classes as part of a medical workup.   Confirmatory testing  by another method should be ordered if clinically indicated. Rejected Test                                 Date: 01/08/2023  Value: cmp           Status: Final  Reason for Rejection                          Date: 01/08/2023  Value: see below     Status: Final                Comment: Unable to perform testing; specimen grossly hemolyzed. To perform testing the specimen will need to be recollected. Hemolyz    Sodium                                        Date: 01/08/2023  Value: 139         Ref range: 135 - 144 mEq/L    Status: Final  Potassium                                     Date: 01/08/2023  Value: 3.1 (A)     Ref range: 3.4 - 4.9 mEq/L    Status: Final  Chloride                                      Date: 01/08/2023  Value: 99          Ref range: 95 - 107 mEq/L     Status: Final  CO2                                           Date: 01/08/2023  Value: 32 (A)      Ref range: 20 - 31 mEq/L      Status: Final  Anion Gap                                     Date: 01/08/2023  Value: 8 (A)       Ref range: 9 - 15 mEq/L       Status: Final  Glucose                                       Date: 01/08/2023  Value: 94          Ref range: 70 - 99 mg/dL      Status: Final  BUN                                           Date: 01/08/2023  Value: 9           Ref range: 8 - 23 mg/dL       Status: Final  Creatinine                                    Date: 01/08/2023  Value: 0.67        Ref range: 0.50 - 0.90 mg/dL  Status: Final  Est, Glom Filt Rate                           Date: 01/08/2023  Value: >60.0       Ref range: >60                Status: Final                Comment: Pediatric calculator link  [email or web address]  Effective Oct 3, 2022  These results are not intended for use in patients  <25years of age. eGFR results are calculated without  a race factor using the 2021 CKD-EPI equation.   Careful  clinical correlation is recommended, particularly when  comparing to results calculated using previous equations. The CKD-EPI equation is less accurate in patients with  extremes of muscle mass, extra-renal metabolism of  creatinine, excessive creatinine ingestion, or following  therapy that affects renal tubular secretion.     Calcium                                       Date: 01/08/2023  Value: 8.4 (A)     Ref range: 8.5 - 9.9 mg/dL    Status: Final  Total Protein                                 Date: 01/08/2023  Value: 5.6 (A)     Ref range: 6.3 - 8.0 g/dL     Status: Final  Albumin                                       Date: 01/08/2023  Value: 2.9 (A)     Ref range: 3.5 - 4.6 g/dL     Status: Final  Total Bilirubin                               Date: 01/08/2023  Value: 0.6         Ref range: 0.2 - 0.7 mg/dL    Status: Final  Alkaline Phosphatase                          Date: 01/08/2023  Value: 105         Ref range: 40 - 130 U/L       Status: Final  ALT                                           Date: 01/08/2023  Value: 9           Ref range: 0 - 33 U/L         Status: Final  AST                                           Date: 01/08/2023  Value: 14          Ref range: 0 - 35 U/L         Status: Final  Globulin                                      Date: 01/08/2023  Value: 2.7         Ref range: 2.3 - 3.5 g/dL     Status: Final  Bacteria, UA                                  Date: 01/08/2023  Value: Negative    Ref range: Negative /HPF      Status: Final  Hyaline Casts, UA                             Date: 01/08/2023  Value: 0-1         Ref range: 0 - 5 /HPF         Status: Final  WBC, UA                                       Date: 01/08/2023  Value: 0-2         Ref range: 0 - 5 /HPF         Status: Final  RBC, UA                                       Date: 01/08/2023  Value: 0-2         Ref range: 0 - 5 /HPF         Status: Final  Epithelial Cells, UA                          Date: 01/08/2023  Value: 0-2         Ref range: 0 - 5 /HPF         Status: Final  WBC                                           Date: 01/09/2023  Value: 6.4         Ref range: 4.8 - 10.8 K/uL    Status: Final  RBC                                           Date: 01/09/2023  Value: 3.83 (A)    Ref range: 4.20 - 5.40 M/uL   Status: Final  Hemoglobin                                    Date: 01/09/2023  Value: 11.8 (A)    Ref range: 12.0 - 16.0 g/dL   Status: Final  Hematocrit                                    Date: 01/09/2023  Value: 34.7 (A)    Ref range: 37.0 - 47.0 %      Status: Final  MCV                                           Date: 01/09/2023  Value: 90.6        Ref range: 79.4 - 94.8 fL     Status: Final  MCH                                           Date: 01/09/2023  Value: 30.8        Ref range: 27.0 - 31.3 pg     Status: Final  MCHC                                          Date: 01/09/2023  Value: 34.0        Ref range: 33.0 - 37.0 %      Status: Final  RDW                                           Date: 01/09/2023  Value: 14.5        Ref range: 11.5 - 14.5 %      Status: Final  Platelets                                     Date: 01/09/2023  Value: 313         Ref range: 130 - 400 K/uL     Status: Final  Neutrophils %                                 Date: 01/09/2023  Value: 58.5        Ref range: %                  Status: Final  Lymphocytes %                                 Date: 01/09/2023  Value: 17.5        Ref range: %                  Status: Final  Monocytes %                                   Date: 01/09/2023  Value: 10.0        Ref range: %                  Status: Final  Eosinophils %                                 Date: 01/09/2023  Value: 12.6        Ref range: %                  Status: Final  Basophils %                                   Date: 01/09/2023  Value: 1.4         Ref range: %                  Status: Final  Neutrophils Absolute                          Date: 01/09/2023  Value: 3.8         Ref range: 1.4 - 6.5 K/uL     Status: Final  Lymphocytes Absolute                          Date: 01/09/2023  Value: 1.1 Ref range: 1.0 - 4.8 K/uL     Status: Final  Monocytes Absolute                            Date: 01/09/2023  Value: 0.6         Ref range: 0.2 - 0.8 K/uL     Status: Final  Eosinophils Absolute                          Date: 01/09/2023  Value: 0.8 (A)     Ref range: 0.0 - 0.7 K/uL     Status: Final  Basophils Absolute                            Date: 01/09/2023  Value: 0.1         Ref range: 0.0 - 0.2 K/uL     Status: Final  Sodium                                        Date: 01/09/2023  Value: 144         Ref range: 135 - 144 mEq/L    Status: Final  Potassium reflex Magnesium                    Date: 01/09/2023  Value: 3.6         Ref range: 3.4 - 4.9 mEq/L    Status: Final  Chloride                                      Date: 01/09/2023  Value: 105         Ref range: 95 - 107 mEq/L     Status: Final  CO2                                           Date: 01/09/2023  Value: 30          Ref range: 20 - 31 mEq/L      Status: Final  Anion Gap                                     Date: 01/09/2023  Value: 9           Ref range: 9 - 15 mEq/L       Status: Final  Glucose                                       Date: 01/09/2023  Value: 79          Ref range: 70 - 99 mg/dL      Status: Final  BUN                                           Date: 01/09/2023  Value: 7 (A)       Ref range: 8 - 23 mg/dL       Status: Final  Creatinine                                    Date: 01/09/2023  Value: 0.61        Ref range: 0.50 - 0.90 mg/dL  Status: Final  Est, Glom Filt Rate                           Date: 01/09/2023  Value: >60.0       Ref range: >60                Status: Final                Comment: Pediatric calculator link  [email or web address]  Effective Oct 3, 2022  These results are not intended for use in patients  <25years of age. eGFR results are calculated without  a race factor using the 2021 CKD-EPI equation.   Careful  clinical correlation is recommended, particularly when  comparing to results calculated using previous equations. The CKD-EPI equation is less accurate in patients with  extremes of muscle mass, extra-renal metabolism of  creatinine, excessive creatinine ingestion, or following  therapy that affects renal tubular secretion.     Calcium                                       Date: 01/09/2023  Value: 8.4 (A)     Ref range: 8.5 - 9.9 mg/dL    Status: Final  Total Protein                                 Date: 01/09/2023  Value: 5.1 (A)     Ref range: 6.3 - 8.0 g/dL     Status: Final  Albumin                                       Date: 01/09/2023  Value: 2.7 (A)     Ref range: 3.5 - 4.6 g/dL     Status: Final  Total Bilirubin                               Date: 01/09/2023  Value: 0.7         Ref range: 0.2 - 0.7 mg/dL    Status: Final  Alkaline Phosphatase                          Date: 01/09/2023  Value: 95          Ref range: 40 - 130 U/L       Status: Final  ALT                                           Date: 01/09/2023  Value: 8           Ref range: 0 - 33 U/L         Status: Final  AST                                           Date: 01/09/2023  Value: 15          Ref range: 0 - 35 U/L         Status: Final  Globulin                                      Date: 01/09/2023  Value: 2.4         Ref range: 2.3 - 3.5 g/dL     Status: Final  WBC                                           Date: 01/10/2023  Value: 7.7         Ref range: 4.8 - 10.8 K/uL    Status: Final  RBC                                           Date: 01/10/2023  Value: 4.04 (A)    Ref range: 4.20 - 5.40 M/uL   Status: Final  Hemoglobin                                    Date: 01/10/2023  Value: 12.2        Ref range: 12.0 - 16.0 g/dL   Status: Final  Hematocrit                                    Date: 01/10/2023  Value: 36.4 (A)    Ref range: 37.0 - 47.0 %      Status: Final  MCV                                           Date: 01/10/2023  Value: 90.2        Ref range: 79.4 - 94.8 fL     Status: Final  MCH Date: 01/10/2023  Value: 30.3        Ref range: 27.0 - 31.3 pg     Status: Final  MCHC                                          Date: 01/10/2023  Value: 33.6        Ref range: 33.0 - 37.0 %      Status: Final  RDW                                           Date: 01/10/2023  Value: 14.8 (A)    Ref range: 11.5 - 14.5 %      Status: Final  Platelets                                     Date: 01/10/2023  Value: 341         Ref range: 130 - 400 K/uL     Status: Final  Neutrophils %                                 Date: 01/10/2023  Value: 65.8        Ref range: %                  Status: Final  Lymphocytes %                                 Date: 01/10/2023  Value: 15.9        Ref range: %                  Status: Final  Monocytes %                                   Date: 01/10/2023  Value: 9.7         Ref range: %                  Status: Final  Eosinophils %                                 Date: 01/10/2023  Value: 8.1         Ref range: %                  Status: Final  Basophils %                                   Date: 01/10/2023  Value: 0.5         Ref range: %                  Status: Final  Neutrophils Absolute                          Date: 01/10/2023  Value: 5.1         Ref range: 1.4 - 6.5 K/uL     Status: Final  Lymphocytes Absolute                          Date: 01/10/2023  Value: 1.2         Ref range: 1.0 - 4.8 K/uL     Status: Final  Monocytes Absolute                            Date: 01/10/2023  Value: 0.7         Ref range: 0.2 - 0.8 K/uL     Status: Final  Eosinophils Absolute                          Date: 01/10/2023  Value: 0.6         Ref range: 0.0 - 0.7 K/uL     Status: Final  Basophils Absolute                            Date: 01/10/2023  Value: 0.0         Ref range: 0.0 - 0.2 K/uL     Status: Final  Sodium                                        Date: 01/10/2023  Value: 140         Ref range: 135 - 144 mEq/L    Status: Final  Potassium reflex Magnesium                    Date: 01/10/2023  Value: 3.6         Ref range: 3.4 - 4.9 mEq/L    Status: Final  Chloride                                      Date: 01/10/2023  Value: 98          Ref range: 95 - 107 mEq/L     Status: Final  CO2                                           Date: 01/10/2023  Value: 32 (A)      Ref range: 20 - 31 mEq/L      Status: Final  Anion Gap                                     Date: 01/10/2023  Value: 10          Ref range: 9 - 15 mEq/L       Status: Final  Glucose                                       Date: 01/10/2023  Value: 79          Ref range: 70 - 99 mg/dL      Status: Final  BUN                                           Date: 01/10/2023  Value: 8           Ref range: 8 - 23 mg/dL       Status: Final  Creatinine                                    Date: 01/10/2023  Value: 0.72        Ref range: 0.50 - 0.90 mg/dL  Status: Final  Est, Glom Filt Rate                           Date: 01/10/2023  Value: >60.0       Ref range: >60                Status: Final                Comment: Pediatric calculator link  [email or web address]. [email or web address]  Effective Oct 3, 2022  These results are not intended for use in patients  <25years of age. eGFR results are calculated without  a race factor using the 2021 CKD-EPI equation. Careful  clinical correlation is recommended, particularly when  comparing to results calculated using previous equations. The CKD-EPI equation is less accurate in patients with  extremes of muscle mass, extra-renal metabolism of  creatinine, excessive creatinine ingestion, or following  therapy that affects renal tubular secretion.     Calcium                                       Date: 01/10/2023  Value: 8.4 (A)     Ref range: 8.5 - 9.9 mg/dL    Status: Final  Total Protein                                 Date: 01/10/2023  Value: 5.4 (A)     Ref range: 6.3 - 8.0 g/dL     Status: Final  Albumin                                       Date: 01/10/2023  Value: 2.8 (A)     Ref range: 3.5 - 4.6 g/dL     Status: Final  Total Bilirubin                               Date: 01/10/2023  Value: 0.6         Ref range: 0.2 - 0.7 mg/dL    Status: Final  Alkaline Phosphatase                          Date: 01/10/2023  Value: 95          Ref range: 40 - 130 U/L       Status: Final  ALT                                           Date: 01/10/2023  Value: 8           Ref range: 0 - 33 U/L         Status: Final  AST                                           Date: 01/10/2023  Value: 16          Ref range: 0 - 35 U/L         Status: Final  Globulin                                      Date: 01/10/2023  Value: 2.6         Ref range: 2.3 - 3.5 g/dL     Status: Final  ------------    Radiology last 7 days:  CT Head W/O Contrast    Result Date: 1/8/2023  No acute intracranial abnormality. [unfilled]    Discharge Medications    Current Discharge Medication List    START taking these medications    lisinopril (PRINIVIL;ZESTRIL) 5 MG tablet  Take 1 tablet by mouth daily  Qty: 30 tablet Refills: 3          Current Discharge Medication List        Current Discharge Medication List    CONTINUE these medications which have NOT CHANGED    sertraline (ZOLOFT) 50 MG tablet  Take 25 mg by mouth daily    Multiple Vitamins-Minerals (MULTIVITAMIN ADULT) CHEW  Take 1 tablet by mouth daily    melatonin 3 MG TABS tablet  Take 3 mg by mouth daily Over counter    betamethasone dipropionate (DIPROLENE) 0.05 % ointment  Apply topically 2 times daily. Qty: 1 Tube Refills: 1    lidocaine (XYLOCAINE) 5 % ointment  Apply topically as needed.   Qty: 30 g Refills: 0          Current Discharge Medication List    STOP taking these medications    cephALEXin (KEFLEX) 500 MG capsule  Comments:  Reason for Stopping:          Time Spent on Discharge:  35 minutes were spent in patient examination, evaluation, counseling as well as medication reconciliation, prescriptions for required medications, discharge plan, and follow up.    Electronically signed by Kadi Cat DO on 1/10/23 at 2:06 PM EST

## 2023-01-10 NOTE — PROGRESS NOTES
[facility]  [facility]  Speech Language Pathology   Treatment Note      Marce Duenas  1940  [id number]  [x]   confirmed      Date: 1/10/2023    Dementia due to Alzheimer's disease ([facility] [G30.9, F02.80]    Restrictions/Precautions: Fall Risk  Position Activity Restriction  Other position/activity restrictions: avasys    Weight: 117 lb (53.1 kg)     ADULT DIET; Regular  ADULT ORAL NUTRITION SUPPLEMENT; Breakfast, Lunch, Dinner; Standard High Calorie/High Protein Oral Supplement    SpO2: 99 % (01/10/23 0719)  No active isolations      Subjective:  Alert, Cooperative, Pleasant, and Confused        Interventions used this date:  Cognitive Skill Development      Objective/Assessment:  Patient progressing towards goals:  Goal 1: To address pt's cognitive deficits and promote orientation, pt will state name of facility, time within 1 hour, reason in hospital, current month and year with 100% accuracy with min assist, with use of external aid. Pt answered orientation questions:  + hospital, given [address], given 2 choices  + time, independently looked at clock and stated correct time  -  situation/injury  - month, stated \"December\"  + year with initial cue, \"_\"  Goal 2: To increase safety awareness and judgment for safe completion of ADLs secondary to pt's cognitive deficits, pt will provide reasonable solutions to problems of everyday living with 80% accuracy and min cues. Pt verbally stated solution to use call light if needing assistance. Poor carry-over x 2 trials. Goal 3: Pt will complete confrontational naming tasks with 80% accuracy with mild verbal cues to help the patient express their basic wants and needs. Pt named pictures of common items with 70% accuracy with min cues and increased time.   Goal 4: Pt will complete descriptive naming tasks with 80% accuracy with min verbal cues to promote use of circumlocution strategy and help the patient express their basic personal, safety, and medical wants and needs in the presence of communication deficits. Pt named after low-mid level description with 80% accuracy with min cues and increased time. Treatment/Activity Tolerance:  Patient tolerated treatment well    Plan:  Continue per POC    Pain Assessment:  Patient c/o pain: Location and pain level: legs, unable to rate  Pt able to proceed with session. Pain Re-assessment:  Patient c/o pain: Described as same as above    Patient/Caregiver Education:  Patient educated on session and progression towards goals. Education needs reinforcement. Safety Devices:  Bed alarm in place, Call light within reach, and Telesitter in use      Therapy Time  SLP Individual Minutes  Time In: 1450  Time Out: [address]  Minutes: 18            Signature: Electronically signed by Jonathan Gimenez.  Valerie Nixon SLP on 1/10/2023 at 3:21 PM

## 2023-01-11 LAB
ALBUMIN SERPL-MCNC: 2.7 G/DL (ref 3.5–4.6)
ALP BLD-CCNC: 86 U/L (ref 40–130)
ALT SERPL-CCNC: 8 U/L (ref 0–33)
ANION GAP SERPL CALCULATED.3IONS-SCNC: 7 MEQ/L (ref 9–15)
AST SERPL-CCNC: 16 U/L (ref 0–35)
BASOPHILS ABSOLUTE: 0 K/UL (ref 0–0.2)
BASOPHILS RELATIVE PERCENT: 0.4 %
BILIRUB SERPL-MCNC: 0.4 MG/DL (ref 0.2–0.7)
BUN BLDV-MCNC: 10 MG/DL (ref 8–23)
CALCIUM SERPL-MCNC: 8.4 MG/DL (ref 8.5–9.9)
CHLORIDE BLD-SCNC: 100 MEQ/L (ref 95–107)
CO2: 31 MEQ/L (ref 20–31)
CREAT SERPL-MCNC: 0.63 MG/DL (ref 0.5–0.9)
EOSINOPHILS ABSOLUTE: 0.6 K/UL (ref 0–0.7)
EOSINOPHILS RELATIVE PERCENT: 9.1 %
GFR SERPL CREATININE-BSD FRML MDRD: >60 ML/MIN/{1.73_M2}
GLOBULIN: 2.5 G/DL (ref 2.3–3.5)
GLUCOSE BLD-MCNC: 89 MG/DL (ref 70–99)
HCT VFR BLD CALC: 35.5 % (ref 37–47)
HEMOGLOBIN: 11.9 G/DL (ref 12–16)
LYMPHOCYTES ABSOLUTE: 1.3 K/UL (ref 1–4.8)
LYMPHOCYTES RELATIVE PERCENT: 18.4 %
MCH RBC QN AUTO: 30.2 PG (ref 27–31.3)
MCHC RBC AUTO-ENTMCNC: 33.5 % (ref 33–37)
MCV RBC AUTO: 90 FL (ref 79.4–94.8)
MONOCYTES ABSOLUTE: 0.7 K/UL (ref 0.2–0.8)
MONOCYTES RELATIVE PERCENT: 10 %
NEUTROPHILS ABSOLUTE: 4.3 K/UL (ref 1.4–6.5)
NEUTROPHILS RELATIVE PERCENT: 62.1 %
PDW BLD-RTO: 15 % (ref 11.5–14.5)
PLATELET # BLD: 323 K/UL (ref 130–400)
POTASSIUM REFLEX MAGNESIUM: 3.8 MEQ/L (ref 3.4–4.9)
RBC # BLD: 3.94 M/UL (ref 4.2–5.4)
SODIUM BLD-SCNC: 138 MEQ/L (ref 135–144)
TOTAL PROTEIN: 5.2 G/DL (ref 6.3–8)
WBC # BLD: 6.8 K/UL (ref 4.8–10.8)

## 2023-01-11 PROCEDURE — 36415 COLL VENOUS BLD VENIPUNCTURE: CPT

## 2023-01-11 PROCEDURE — 96372 THER/PROPH/DIAG INJ SC/IM: CPT

## 2023-01-11 PROCEDURE — 97116 GAIT TRAINING THERAPY: CPT

## 2023-01-11 PROCEDURE — G0378 HOSPITAL OBSERVATION PER HR: HCPCS

## 2023-01-11 PROCEDURE — 6360000002 HC RX W HCPCS

## 2023-01-11 PROCEDURE — 85025 COMPLETE CBC W/AUTO DIFF WBC: CPT

## 2023-01-11 PROCEDURE — 97129 THER IVNTJ 1ST 15 MIN: CPT

## 2023-01-11 PROCEDURE — 80053 COMPREHEN METABOLIC PANEL: CPT

## 2023-01-11 PROCEDURE — 6370000000 HC RX 637 (ALT 250 FOR IP): Performed by: INTERNAL MEDICINE

## 2023-01-11 PROCEDURE — 6370000000 HC RX 637 (ALT 250 FOR IP)

## 2023-01-11 RX ADMIN — Medication 3 MG: at 23:33

## 2023-01-11 RX ADMIN — ACETAMINOPHEN 650 MG: 325 TABLET ORAL at 14:03

## 2023-01-11 RX ADMIN — ENOXAPARIN SODIUM 40 MG: 100 INJECTION SUBCUTANEOUS at 09:19

## 2023-01-11 RX ADMIN — Medication 1 TABLET: at 09:19

## 2023-01-11 RX ADMIN — LISINOPRIL 5 MG: 5 TABLET ORAL at 09:20

## 2023-01-11 RX ADMIN — SERTRALINE HYDROCHLORIDE 25 MG: 25 TABLET ORAL at 09:19

## 2023-01-11 NOTE — CARE COORDINATION
ELIZABET spoke with Karma at the [facility] and we have precert approval for her to go tonight. Ambulance transport is arranged for 7pm.  Daughter Duana Habermann is aware and will meet the pt there.

## 2023-01-11 NOTE — PROGRESS NOTES
Patient ok to discharge. Report given to Laurent at [address]. Patient scheduled for  at 7pm.    Waiting for discharge orders to be put in at this time.

## 2023-01-11 NOTE — PROGRESS NOTES
Progress Note  Date:2023       [facility]:[id number]  Patient Prashanth Evans     YOB: 1940     Age:82 y.o. Subjective      No acute events reported overnight  Infectious work-up negative  Morning labs reassuring. Patient denies any acute physical complaints at time of exam.    Unable to complete full standard ROS in setting of baseline dementia. Objective         Vitals Last 24 Hours:  TEMPERATURE:  Temp  Av.6 °F (37 °C)  Min: 98.6 °F (37 °C)  Max: 98.6 °F (37 °C)  RESPIRATIONS RANGE: Resp  Av  Min: 18  Max: 18  PULSE OXIMETRY RANGE: SpO2  Av %  Min: 95 %  Max: 95 %  PULSE RANGE: Pulse  Av  Min: 76  Max: 78  BLOOD PRESSURE RANGE: Systolic (14YAN), VCH:945 , Min:113 , WIM:787   ; Diastolic (76WHK), INK:97, Min:66, Max:73    I/O (24Hr): No intake or output data in the 24 hours ending 23  Objective:  Vital signs: (most recent): Blood pressure (!) 150/66, pulse 76, temperature 98.6 °F (37 °C), temperature source Oral, resp. rate 18, height 5' 4\" (1.626 m), weight 117 lb (53.1 kg), SpO2 95 %. Constitutional: Frail elderly female reclining in bed no acute distress. Family present bedside. Head: NCAT  Eyes: PERRLA, EOMI. Left-sided ectropion appreciated. Neck: Trachea midline, phonation intelligible. Cardiovascular: RRR. No significant murmurs appreciated. Warm and well perfused peripherally. No significant pretibial edema. Pulmonary: Normal rate and effort respiration on room air. Grossly CTA B. No coughing during exam.  Abdomen: Soft, nontense, nondistended. Bowel sounds present. Neurologic: Alert, oriented to self and family. Moderately poor historian. Follows all commands. No dysarthria. Psychiatric: Mildly anxious. Impaired attention and recall.       Labs/Imaging/Diagnostics    Labs:  CBC:  Recent Labs     23  0610 01/10/23  0456 23  0448   WBC 6.4 7.7 6.8   RBC 3.83* 4.04* 3.94*   HGB 11.8* 12.2 11.9*   HCT 34.7* 36.4* 35.5*   MCV 90.6 90.2 90.0   RDW 14.5 14.8* 15.0*    341 323       CHEMISTRIES:  Recent Labs     01/09/23  0610 01/10/23  0456 01/11/23  0448    140 138   K 3.6 3.6 3.8    98 100   CO2 30 32* 31   BUN 7* 8 10   CREATININE 0.61 0.72 0.63   GLUCOSE 79 79 89       PT/INR:No results for input(s): PROTIME, INR in the last 72 hours. APTT:No results for input(s): APTT in the last 72 hours. LIVER PROFILE:  Recent Labs     01/09/23  0610 01/10/23  0456 01/11/23  0448   AST 15 16 16   ALT 8 8 8   BILITOT 0.7 0.6 0.4   ALKPHOS 95 95 86         Imaging Last 24 Hours:  CT Head W/O Contrast    Result Date: 1/8/2023  EXAMINATION: CT OF THE HEAD WITHOUT CONTRAST  1/8/2023 5:45 pm TECHNIQUE: CT of the head was performed without the administration of intravenous contrast. Automated exposure control, iterative reconstruction, and/or weight based adjustment of the mA/kV was utilized to reduce the radiation dose to as low as reasonably achievable. COMPARISON: 10/28/2022 HISTORY: ORDERING SYSTEM PROVIDED HISTORY: altered mental status TECHNOLOGIST PROVIDED HISTORY: Reason for exam:->altered mental status Has a \"code stroke\" or \"stroke alert\" been called? ->No Decision Support Exception - unselect if not a suspected or confirmed emergency medical condition->Emergency Medical Condition (MA) What reading provider will be dictating this exam?->CRC FINDINGS: BRAIN/VENTRICLES: There is no acute intracranial hemorrhage, mass effect or midline shift. No abnormal extra-axial fluid collection. The gray-white differentiation is maintained without evidence of an acute infarct. There is no evidence of hydrocephalus. Mild to moderate diffuse brain atrophy is noted. ORBITS: The visualized portion of the orbits demonstrate no acute abnormality. SINUSES: The visualized paranasal sinuses and mastoid air cells demonstrate no acute abnormality. SOFT TISSUES/SKULL:  No acute abnormality of the visualized skull or soft tissues.      No acute intracranial abnormality. Assessment//Plan           Hospital Problems             Last Modified POA    * (Principal) Dementia due to Alzheimer's disease ([facility] [address] [facility] 1/8/2023 Yes   Assessment & Plan    Dementia  Paranoid and confused. Alert to self. Needs reassurance that she is not harming herself. She is afraid that she is doing something to to harm herself. Admit  Case management for discharge placement  PT/OT/SLP  Vistaril  Avasis  Bed alarm engaged at all times  Hypokalemia  Potassium 3.1 on presentation  Monitor potassium, maximize electrolytes and replenish to maintain potassium 4.0 to 5.0. Dysphagia  Patient reports having trouble swallowing, tolerated thin liquids with pills   SLP evaluated, recommend Reg diet with Thin liquids  Puritis  Patient itching tops of thighs, small scabs from scratching  Vistaril  Depression  Takes zoloft  Restart home med  Hypertension  SBP often ~170s-180s. Started on low dose lisinopril. Disposition: CM working on appropriate disposition options. Was medically ready for discharge as of 1/10. Avoidable inpatient days beginning 1/11/23, pending placement.        Electronically signed by Sabra Larios DO on 1/11/2023 at 9:18 AM

## 2023-01-11 NOTE — PROGRESS NOTES
[facility]   Facility/Department: [facility]  Speech Language Pathology  Treatment Note      Sonido Pascual  1940  [id number]  [x]   confirmed      Date: 2023    Dementia due to Alzheimer's disease ([facility] [G30.9, F02.80]    Restrictions/Precautions: Fall Risk  Position Activity Restriction  Other position/activity restrictions: avasys    Weight: 117 lb (53.1 kg)     ADULT DIET; Regular  ADULT ORAL NUTRITION SUPPLEMENT; Breakfast, Lunch, Dinner; Standard High Calorie/High Protein Oral Supplement    SpO2: 95 % (01/10/23 2154)  No active isolations    Speech Dx: Cognitive Linguistic Impairment    Subjective:  Alert, Cooperative, and Confused      Daughter present at beginning of tx. SLP assisted PCA with transferring pt for toileting. Pt very weak and unable to ambulate to bathroom SLP assisted PCA with transferring pt back to bed. SLP left room for PCA to place [place]   Pt was pleasant but continued to call herself \"stupid\" when unable to answer questions. SLP provided support and encouragement. Interventions used this date:  Cognitive Skill Development      Objective/Assessment:  Patient progressing towards goals:  Short Term Goals  Time Frame for Short Term Goals: 1-2 weeks  Goal 1: To address pt's cognitive deficits and promote orientation, pt will state name of facility, time within 1 hour, reason in hospital, current month and year with 100% accuracy with min assist, with use of external aid. Pt unable to state hospital name, month, reason for admission when prompted. Pt able to state year. Given two verbal choices, pt able to state name of hospital and month. Following a 15 minute delay, pt was unable to recall month or hospital without verbal choices. Goal 2: To increase safety awareness and judgment for safe completion of ADLs secondary to pt's cognitive deficits, pt will provide reasonable solutions to problems of everyday living with 80% accuracy and min cues.   Not addressed   Goal 3: Pt will complete confrontational naming tasks with 80% accuracy with mild verbal cues to help the patient express their basic wants and needs. 70% acc I increasing to 80% acc with min cues  Goal 4: Pt will complete descriptive naming tasks with 80% accuracy with min verbal cues to promote use of circumlocution strategy and help the patient express their basic personal, safety, and medical wants and needs in the presence of communication deficits. 50% acc I increasing to 100% when given two visual chicles. Pt able to state name of item when pointing to the correct choice in 50% of opportunities. Treatment/Activity Tolerance:  Patient tolerated treatment well    Plan:  Continue per POC    Pain Assessment:  Patient does not c/o pain. Pain Re-assessment:  Patient does not c/o pain. Patient/Caregiver Education:  Patient educated on session and progression towards goals. Caregiver education on session and progress towards goals. Patient stated verbal understanding of directions. Caregiver stated verbal understanding of directions. Safety Devices:   All fall risk precautions in place    Therapy Time  SLP Individual Minutes  Time In: 1550  Time Out: [address] [place] [address]  Minutes: 25            Signature: Electronically signed by HUDSON Asif on 1/11/2023 at 4:35 PM

## 2023-01-11 NOTE — PROGRESS NOTES
Physical Therapy Med Surg Daily Treatment Note  [facility]       NAME: Sarah Karimi  : 1940 (79 y.o.)  MRN: [id number]  CODE STATUS: Full Code    Date of Service: 2023    Patient Diagnosis(es): Dementia due to Alzheimer's disease ([facility] [G30.9, F02.80]   Chief Complaint   Patient presents with    Altered Mental Status     Per daughter patient has had some paranoia and confusion     Patient Active Problem List    Diagnosis Date Noted    Dementia due to Alzheimer's disease ([facility] 2023        Past Medical History:   Diagnosis Date    Cerebral artery occlusion with cerebral infarction ([facility]     Dementia ([facility]      Past Surgical History:   Procedure Laterality Date    ANKLE FRACTURE SURGERY Left     CHOLECYSTECTOMY      TUBAL LIGATION         Chart Reviewed: Yes  Family / Caregiver Present: No  Diagnosis: Dementia due to Alzheimer's disease ([facility]    Restrictions:  Restrictions/Precautions: Fall Risk  Position Activity Restriction  Other position/activity restrictions: avasys    SUBJECTIVE:   Subjective: Patient resting in bed. Agreeable to tx with encouragement. Response To Previous Treatment: Patient with no complaints from previous session. Pain  C/o bilateral LE pain, unable to rate. OBJECTIVE:        Bed mobility  Supine to Sit: Supervision  Sit to Supine: Supervision  Scooting: Supervision    Transfers  Sit to Stand: Contact guard assistance  Stand to Sit: Contact guard assistance  Bed to Chair: Contact guard assistance    Ambulation  Surface: Level tile  Device: Rolling Walker  Assistance: Stand by assistance  Quality of Gait: step to antalgic  gait, decreased speed, NBOS, mildly unsteady  Distance: 50 feet x2    ASSESSMENT   Body Structures, Functions, Activity Limitations Requiring Skilled Therapeutic Intervention: Decreased functional mobility ; Decreased ADL status; Decreased safe awareness;Decreased cognition;Decreased balance  Assessment: Patient requires increased assistance with transfers since previous session. C/o dizziness during ambulation. /51 mmHg seated post ambulation, 81/44 mmHg in static standing following RB,. RN notified. Discharge Recommendations:  Continue to assess pending progress, Patient would benefit from continued therapy after discharge         Goals  Long Term Goals  Long Term Goal 1: Pt to complete bed mobility with indep  Long Term Goal 2: Pt to complete transfers with indep  Long Term Goal 3: Pt to ambulate 50-150ft with LRD and supervision  Long Term Goal 4: Pt to achieve >42/56 [place]ch to demo low falls risk    PLAN    General Plan: 1 time a day 3-6 times a week  Safety Devices  Type of Devices: All fall risk precautions in place, Call light within reach, Bed alarm in place, Left in bed     AMPAC (6 CLICK) BASIC MOBILITY  AM-PAC Inpatient Mobility Raw Score : 18     Therapy Time   Individual   Time In 1100   Time Out 1115   Minutes 15     Timed Code Treatment Minutes: [facility], [facility], 01/11/23 at 12:04 PM         Definitions for assistance levels  Independent = pt does not require any physical supervision or assistance from another person for activity completion. Device may be needed.   Stand by assistance = pt requires verbal cues or instructions from another person, close to but not touching, to perform the activity  Minimal assistance= pt performs 75% or more of the activity; assistance is required to complete the activity  Moderate assistance= pt performs 50% of the activity; assistance is required to complete the activity  Maximal assistance = pt performs 25% of the activity; assistance is required to complete the activity  Dependent = pt requires total physical assistance to accomplish the task

## 2023-01-11 NOTE — DISCHARGE INSTR - COC
Continuity of Care Form    Patient Name: Aleta Ricardo   :  1940  MRN:  [id number]    Admit date:  2023  Discharge date:  2023    Code Status Order: Full Code   Advance Directives:     Admitting Physician:  Isha Kellogg DO  PCP: RONAL Turner CNP    Discharging Nurse: Elfego Dobbs Unit/Room#: S879/W163-95  Discharging Unit Phone Number: [phone]    Emergency Contact:   Extended Emergency Contact Information  Primary Emergency Contact: Ludin Heath  Address: [address] [place] [address], [address] [place] [address] Phone: [phone]  Mobile Phone: [phone]  Relation: Child    Past Surgical History:  Past Surgical History:   Procedure Laterality Date    ANKLE FRACTURE SURGERY Left     CHOLECYSTECTOMY      TUBAL LIGATION         Immunization History:   Immunization History   Administered Date(s) Administered    COVID-19, PFIZER Bivalent BOOSTER, DO NOT Dilute, (age 12y+), IM, 30 mcg/0.3 mL 2022    COVID-19, PFIZER GRAY top, DO NOT Dilute, (age 15 y+), IM, 30 mcg/0.3 mL 2022    COVID-19, PFIZER PURPLE top, DILUTE for use, (age 15 y+), 30mcg/0.3mL 2021, 2021, 2021       Active Problems:  Patient Active Problem List   Diagnosis Code    [facility] G30.9, F02.80       Isolation/Infection:   Isolation            No Isolation          Patient Infection Status       None to display            Nurse Assessment:  Last Vital Signs: BP (!) 150/66   Pulse 76   Temp 98.6 °F (37 °C) (Oral)   Resp 18   Ht 5' 4\" (1.626 m)   Wt 117 lb (53.1 kg)   SpO2 95%   BMI 20.08 kg/m²     Last documented pain score (0-10 scale):    Last Weight:   Wt Readings from Last 1 Encounters:   01/10/23 117 lb (53.1 kg)     Mental Status:  disoriented    IV Access:  - None    Nursing Mobility/ADLs:  Walking   Assisted  Transfer  Assisted  Bathing  Assisted  Dressing  Assisted  [address] Dependent  Med Delivery   whole    Wound Care Documentation and Therapy:        Elimination:  Continence: Bowel: yes  Bladder: Yes and No  Urinary Catheter: None   Colostomy/Ileostomy/Ileal Conduit: No       Date of Last BM:   No intake or output data in the 24 hours ending 01/11/23 1643  No intake/output data recorded. Safety Concerns:     Sundowners [place] and At Risk for Falls    Impairments/Disabilities:      None    Nutrition Therapy:  Current Nutrition Therapy:   - Oral Diet:  General    Routes of Feeding: Oral  Liquids: Thin Liquids  Daily Fluid Restriction: no  Last Modified Barium Swallow with Video (Video Swallowing Test): not done    Treatments at the Time of Hospital Discharge:   Respiratory Treatments: none  Oxygen Therapy:  is not on home oxygen therapy.   Ventilator:    - No ventilator support    Rehab Therapies: Physical Therapy and Occupational Therapy  Weight Bearing Status/Restrictions: No weight bearing restrictions  Other Medical Equipment (for information only, NOT a DME order):  wheelchair and walker  Other Treatments: ***    Patient's personal belongings (please select all that are sent with patient):  None    RN SIGNATURE:  Electronically signed by Hailey Saucedo RN on 1/11/23 at 5:08 PM EST    CASE MANAGEMENT/SOCIAL WORK SECTION    Inpatient Status Date: 1/8/23    Readmission Risk Assessment Score:  Readmission Risk              Risk of Unplanned Readmission:  0           Discharging to Facility/ Agency   Name:   Address:  Phone:  Fax:    Dialysis Facility (if applicable)   Name:  Address:  Dialysis Schedule:  Phone:  Fax:    / signature: Electronically signed by ELIZABET Soto on 1/11/23 at 4:43 PM EST    PHYSICIAN SECTION    Prognosis: Good    Condition at Discharge: Stable    Rehab Potential (if transferring to Rehab): Good    Recommended Labs or Other Treatments After Discharge: N/A    Physician Certification: I certify the above information and transfer of Marce Duenas  is necessary for the continuing treatment of the diagnosis listed and that she requires [place] for either > or <30 days, pending clinical course.      Update Admission H&P: No change in H&P    PHYSICIAN SIGNATURE:  Electronically signed by Myra Hatchet, DO on 1/12/23 at 9:38 AM EST

## 2023-01-12 VITALS
TEMPERATURE: 98.6 F | HEART RATE: 78 BPM | DIASTOLIC BLOOD PRESSURE: 70 MMHG | WEIGHT: 117 LBS | OXYGEN SATURATION: 95 % | SYSTOLIC BLOOD PRESSURE: 171 MMHG | HEIGHT: 64 IN | RESPIRATION RATE: 18 BRPM | BODY MASS INDEX: 19.97 KG/M2

## 2023-01-12 LAB
ALBUMIN SERPL-MCNC: 2.6 G/DL (ref 3.5–4.6)
ALP BLD-CCNC: 79 U/L (ref 40–130)
ALT SERPL-CCNC: 8 U/L (ref 0–33)
ANION GAP SERPL CALCULATED.3IONS-SCNC: 7 MEQ/L (ref 9–15)
AST SERPL-CCNC: 14 U/L (ref 0–35)
BASOPHILS ABSOLUTE: 0 K/UL (ref 0–0.2)
BASOPHILS RELATIVE PERCENT: 0.6 %
BILIRUB SERPL-MCNC: 0.3 MG/DL (ref 0.2–0.7)
BUN BLDV-MCNC: 13 MG/DL (ref 8–23)
CALCIUM SERPL-MCNC: 8.3 MG/DL (ref 8.5–9.9)
CHLORIDE BLD-SCNC: 99 MEQ/L (ref 95–107)
CO2: 30 MEQ/L (ref 20–31)
CREAT SERPL-MCNC: 0.59 MG/DL (ref 0.5–0.9)
EOSINOPHILS ABSOLUTE: 0.5 K/UL (ref 0–0.7)
EOSINOPHILS RELATIVE PERCENT: 6 %
GFR SERPL CREATININE-BSD FRML MDRD: >60 ML/MIN/{1.73_M2}
GLOBULIN: 2.6 G/DL (ref 2.3–3.5)
GLUCOSE BLD-MCNC: 91 MG/DL (ref 70–99)
HCT VFR BLD CALC: 34.3 % (ref 37–47)
HEMOGLOBIN: 11.5 G/DL (ref 12–16)
LYMPHOCYTES ABSOLUTE: 1.1 K/UL (ref 1–4.8)
LYMPHOCYTES RELATIVE PERCENT: 15.1 %
MCH RBC QN AUTO: 30.6 PG (ref 27–31.3)
MCHC RBC AUTO-ENTMCNC: 33.4 % (ref 33–37)
MCV RBC AUTO: 91.5 FL (ref 79.4–94.8)
MONOCYTES ABSOLUTE: 0.8 K/UL (ref 0.2–0.8)
MONOCYTES RELATIVE PERCENT: 10.2 %
NEUTROPHILS ABSOLUTE: 5.1 K/UL (ref 1.4–6.5)
NEUTROPHILS RELATIVE PERCENT: 68.1 %
PDW BLD-RTO: 15.1 % (ref 11.5–14.5)
PLATELET # BLD: 302 K/UL (ref 130–400)
POTASSIUM REFLEX MAGNESIUM: 3.9 MEQ/L (ref 3.4–4.9)
RBC # BLD: 3.75 M/UL (ref 4.2–5.4)
SODIUM BLD-SCNC: 136 MEQ/L (ref 135–144)
TOTAL PROTEIN: 5.2 G/DL (ref 6.3–8)
WBC # BLD: 7.6 K/UL (ref 4.8–10.8)

## 2023-01-12 PROCEDURE — 6370000000 HC RX 637 (ALT 250 FOR IP): Performed by: INTERNAL MEDICINE

## 2023-01-12 PROCEDURE — 96372 THER/PROPH/DIAG INJ SC/IM: CPT

## 2023-01-12 PROCEDURE — 36415 COLL VENOUS BLD VENIPUNCTURE: CPT

## 2023-01-12 PROCEDURE — 6360000002 HC RX W HCPCS

## 2023-01-12 PROCEDURE — 80053 COMPREHEN METABOLIC PANEL: CPT

## 2023-01-12 PROCEDURE — 6370000000 HC RX 637 (ALT 250 FOR IP)

## 2023-01-12 PROCEDURE — 85025 COMPLETE CBC W/AUTO DIFF WBC: CPT

## 2023-01-12 PROCEDURE — G0378 HOSPITAL OBSERVATION PER HR: HCPCS

## 2023-01-12 RX ORDER — CETIRIZINE HYDROCHLORIDE 10 MG/1
10 TABLET ORAL DAILY
Status: DISCONTINUED | OUTPATIENT
Start: 2023-01-12 | End: 2023-01-12 | Stop reason: HOSPADM

## 2023-01-12 RX ADMIN — LISINOPRIL 5 MG: 5 TABLET ORAL at 09:55

## 2023-01-12 RX ADMIN — Medication 1 TABLET: at 09:55

## 2023-01-12 RX ADMIN — SERTRALINE HYDROCHLORIDE 25 MG: 25 TABLET ORAL at 09:55

## 2023-01-12 RX ADMIN — ENOXAPARIN SODIUM 40 MG: 100 INJECTION SUBCUTANEOUS at 09:54

## 2023-01-12 RX ADMIN — CETIRIZINE HYDROCHLORIDE 10 MG: 10 TABLET, FILM COATED ORAL at 09:56

## 2023-01-12 NOTE — CARE COORDINATION
PATIENT TO BE D/C TODAY TO [facility]. CM/LSW TO FOLLOW FOR ANY NEW D/C NEEDS OR CHANGES TO THE D/C PLAN.  P/U 2 PM.

## 2023-01-12 NOTE — PROGRESS NOTES
Progress Note  Date:2023       KMWW:[id number]  Patient Rosa Hare     YOB: 1940     Age:82 y.o. Subjective      No acute events reported overnight  Infectious work-up negative  Morning labs reassuring. Patient denies any acute physical complaints at time of exam.    Unable to complete full standard ROS in setting of baseline dementia. Objective         Vitals Last 24 Hours:  TEMPERATURE:  Temp  Av.4 °F (36.9 °C)  Min: 98.2 °F (36.8 °C)  Max: 98.6 °F (37 °C)  RESPIRATIONS RANGE: No data recorded  PULSE OXIMETRY RANGE: SpO2  Av.5 %  Min: 95 %  Max: 98 %  PULSE RANGE: Pulse  Av.5  Min: 75  Max: 78  BLOOD PRESSURE RANGE: Systolic (00QRE), PVB:340 , Min:157 , OFK:532   ; Diastolic (52BIJ), ZCP:83, Min:70, Max:93    I/O (24Hr): Intake/Output Summary (Last 24 hours) at 2023  Last data filed at 2023  Gross per 24 hour   Intake 300 ml   Output --   Net 300 ml     Objective:  Vital signs: (most recent): Blood pressure (!) 171/70, pulse 78, temperature 98.6 °F (37 °C), resp. rate 18, height 5' 4\" (1.626 m), weight 117 lb (53.1 kg), SpO2 95 %. Constitutional: Frail elderly female reclining in bed no acute distress. Eating breakfast at time of exam.  Head: NCAT  Eyes: PERRLA, EOMI. Left-sided ectropion appreciated. Neck: Trachea midline, phonation intelligible. Cardiovascular: RRR. No significant murmurs appreciated. Warm and well perfused peripherally. No significant pretibial edema. Pulmonary: Normal rate and effort respiration on room air. Grossly CTAB. No coughing during exam.  Abdomen: Soft, nontense, nondistended. Bowel sounds present. Neurologic: Alert, oriented to self and family. Moderately poor historian. Follows all commands. No dysarthria. Psychiatric: Less anxious today. Impaired attention and recall.       Labs/Imaging/Diagnostics    Labs:  CBC:  Recent Labs     01/10/23  0456 23  0448 23  0544   WBC 7.7 6.8 7.6   RBC 4.04* 3.94* 3.75*   HGB 12.2 11.9* 11.5*   HCT 36.4* 35.5* 34.3*   MCV 90.2 90.0 91.5   RDW 14.8* 15.0* 15.1*    323 302       CHEMISTRIES:  Recent Labs     01/10/23  0456 01/11/23  0448 01/12/23  0544    138 136   K 3.6 3.8 3.9   CL 98 100 99   CO2 32* 31 30   BUN 8 10 13   CREATININE 0.72 0.63 0.59   GLUCOSE 79 89 91       PT/INR:No results for input(s): PROTIME, INR in the last 72 hours. APTT:No results for input(s): APTT in the last 72 hours. LIVER PROFILE:  Recent Labs     01/10/23  0456 01/11/23  0448 01/12/23  0544   AST 16 16 14   ALT 8 8 8   BILITOT 0.6 0.4 0.3   ALKPHOS 95 86 79         Imaging Last 24 Hours:  CT Head W/O Contrast    Result Date: 1/8/2023  EXAMINATION: CT OF THE HEAD WITHOUT CONTRAST  1/8/2023 5:45 pm TECHNIQUE: CT of the head was performed without the administration of intravenous contrast. Automated exposure control, iterative reconstruction, and/or weight based adjustment of the mA/kV was utilized to reduce the radiation dose to as low as reasonably achievable. COMPARISON: 10/28/2022 HISTORY: ORDERING SYSTEM PROVIDED HISTORY: altered mental status TECHNOLOGIST PROVIDED HISTORY: Reason for exam:->altered mental status Has a \"code stroke\" or \"stroke alert\" been called? ->No Decision Support Exception - unselect if not a suspected or confirmed emergency medical condition->Emergency Medical Condition (MA) What reading provider will be dictating this exam?->CRC FINDINGS: BRAIN/VENTRICLES: There is no acute intracranial hemorrhage, mass effect or midline shift. No abnormal extra-axial fluid collection. The gray-white differentiation is maintained without evidence of an acute infarct. There is no evidence of hydrocephalus. Mild to moderate diffuse brain atrophy is noted. ORBITS: The visualized portion of the orbits demonstrate no acute abnormality. SINUSES: The visualized paranasal sinuses and mastoid air cells demonstrate no acute abnormality.  SOFT TISSUES/SKULL:  No acute abnormality of the visualized skull or soft tissues. No acute intracranial abnormality. Assessment//Plan           Hospital Problems             Last Modified POA    * (Principal) Dementia due to Alzheimer's disease ([facility] [address] 1/8/2023 Yes   Assessment & Plan    Dementia  Paranoid and confused. Alert to self. Needs reassurance that she is not harming herself. She is afraid that she is doing something to to harm herself. Admit  Case management for discharge placement  PT/OT/SLP  Vistaril  Avasis  Bed alarm engaged at all times  Hypokalemia  Potassium 3.1 on presentation  Monitor potassium, maximize electrolytes and replenish to maintain potassium 4.0 to 5.0. Dysphagia  Patient reports having trouble swallowing, tolerated thin liquids with pills   SLP evaluated, recommend Reg diet with Thin liquids  Puritis  Patient itching tops of thighs, small scabs from scratching  Vistaril  Depression  Takes zoloft  Restart home med  Hypertension  SBP often ~170s-180s. Started on low dose lisinopril. Disposition: CM working on appropriate disposition options. Was medically ready for discharge as of 1/10. Avoidable inpatient days beginning 1/11/23, pending placement. Planned to go out to SNF later today, per nursing.         Electronically signed by Houston Prader, DO on 1/12/2023 at 9:18 AM

## 2024-02-19 ENCOUNTER — HOSPITAL ENCOUNTER (INPATIENT)
Facility: HOSPITAL | Age: 84
LOS: 7 days | Discharge: NURSING, REHAB OR LONG-TERM CARE | DRG: 083 | End: 2024-02-27
Attending: EMERGENCY MEDICINE | Admitting: SURGERY
Payer: MEDICARE

## 2024-02-19 ENCOUNTER — APPOINTMENT (OUTPATIENT)
Dept: RADIOLOGY | Facility: HOSPITAL | Age: 84
DRG: 083 | End: 2024-02-19
Payer: MEDICARE
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
## 2024-02-19 DIAGNOSIS — S06.5XAA SUBDURAL HEMATOMA (MULTI): Primary | ICD-10-CM

## 2024-02-19 DIAGNOSIS — S32.030S CLOSED COMPRESSION FRACTURE OF L3 LUMBAR VERTEBRA, SEQUELA: ICD-10-CM

## 2024-02-19 DIAGNOSIS — S52.502A CLOSED FRACTURE OF DISTAL END OF LEFT RADIUS, UNSPECIFIED FRACTURE MORPHOLOGY, INITIAL ENCOUNTER: ICD-10-CM

## 2024-02-19 PROCEDURE — 70450 CT HEAD/BRAIN W/O DYE: CPT

## 2024-02-19 PROCEDURE — 96375 TX/PRO/DX INJ NEW DRUG ADDON: CPT

## 2024-02-19 PROCEDURE — 99285 EMERGENCY DEPT VISIT HI MDM: CPT | Performed by: EMERGENCY MEDICINE

## 2024-02-19 PROCEDURE — G0390 TRAUMA RESPONS W/HOSP CRITI: HCPCS

## 2024-02-19 PROCEDURE — 70450 CT HEAD/BRAIN W/O DYE: CPT | Performed by: RADIOLOGY

## 2024-02-19 PROCEDURE — 96374 THER/PROPH/DIAG INJ IV PUSH: CPT

## 2024-02-19 PROCEDURE — 93010 ELECTROCARDIOGRAM REPORT: CPT

## 2024-02-19 PROCEDURE — 99285 EMERGENCY DEPT VISIT HI MDM: CPT | Mod: 25

## 2024-02-19 PROCEDURE — 96376 TX/PRO/DX INJ SAME DRUG ADON: CPT

## 2024-02-20 ENCOUNTER — APPOINTMENT (OUTPATIENT)
Dept: RADIOLOGY | Facility: HOSPITAL | Age: 84
DRG: 083 | End: 2024-02-20
Payer: MEDICARE

## 2024-02-20 ENCOUNTER — CLINICAL SUPPORT (OUTPATIENT)
Dept: EMERGENCY MEDICINE | Facility: HOSPITAL | Age: 84
DRG: 083 | End: 2024-02-20
Payer: MEDICARE

## 2024-02-20 PROBLEM — S06.5XAA SUBDURAL HEMATOMA (MULTI): Status: ACTIVE | Noted: 2024-02-20

## 2024-02-20 LAB
ABO GROUP (TYPE) IN BLOOD: NORMAL
ALBUMIN SERPL BCP-MCNC: 3.6 G/DL (ref 3.4–5)
ALP SERPL-CCNC: 85 U/L (ref 33–136)
ALT SERPL W P-5'-P-CCNC: 7 U/L (ref 7–45)
ANION GAP SERPL CALC-SCNC: 13 MMOL/L (ref 10–20)
ANTIBODY SCREEN: NORMAL
APPEARANCE UR: CLEAR
APTT PPP: 25 SECONDS (ref 27–38)
AST SERPL W P-5'-P-CCNC: 15 U/L (ref 9–39)
ATRIAL RATE: 70 BPM
BASOPHILS # BLD AUTO: 0.07 X10*3/UL (ref 0–0.1)
BASOPHILS NFR BLD AUTO: 0.8 %
BILIRUB SERPL-MCNC: 0.4 MG/DL (ref 0–1.2)
BILIRUB UR STRIP.AUTO-MCNC: NEGATIVE MG/DL
BUN SERPL-MCNC: 19 MG/DL (ref 6–23)
CALCIUM SERPL-MCNC: 9.2 MG/DL (ref 8.6–10.6)
CHLORIDE SERPL-SCNC: 98 MMOL/L (ref 98–107)
CO2 SERPL-SCNC: 30 MMOL/L (ref 21–32)
COLOR UR: ABNORMAL
CREAT SERPL-MCNC: 0.82 MG/DL (ref 0.5–1.05)
EGFRCR SERPLBLD CKD-EPI 2021: 71 ML/MIN/1.73M*2
EOSINOPHIL # BLD AUTO: 0.67 X10*3/UL (ref 0–0.4)
EOSINOPHIL NFR BLD AUTO: 7.4 %
ERYTHROCYTE [DISTWIDTH] IN BLOOD BY AUTOMATED COUNT: 12.7 % (ref 11.5–14.5)
GLUCOSE SERPL-MCNC: 80 MG/DL (ref 74–99)
GLUCOSE UR STRIP.AUTO-MCNC: NORMAL MG/DL
HCT VFR BLD AUTO: 36.1 % (ref 36–46)
HGB BLD-MCNC: 12.1 G/DL (ref 12–16)
IMM GRANULOCYTES # BLD AUTO: 0.02 X10*3/UL (ref 0–0.5)
IMM GRANULOCYTES NFR BLD AUTO: 0.2 % (ref 0–0.9)
INR PPP: 1 (ref 0.9–1.1)
KETONES UR STRIP.AUTO-MCNC: NEGATIVE MG/DL
LEUKOCYTE ESTERASE UR QL STRIP.AUTO: NEGATIVE
LYMPHOCYTES # BLD AUTO: 1.31 X10*3/UL (ref 0.8–3)
LYMPHOCYTES NFR BLD AUTO: 14.5 %
MAGNESIUM SERPL-MCNC: 1.98 MG/DL (ref 1.6–2.4)
MCH RBC QN AUTO: 30.1 PG (ref 26–34)
MCHC RBC AUTO-ENTMCNC: 33.5 G/DL (ref 32–36)
MCV RBC AUTO: 90 FL (ref 80–100)
MONOCYTES # BLD AUTO: 0.79 X10*3/UL (ref 0.05–0.8)
MONOCYTES NFR BLD AUTO: 8.8 %
NEUTROPHILS # BLD AUTO: 6.15 X10*3/UL (ref 1.6–5.5)
NEUTROPHILS NFR BLD AUTO: 68.3 %
NITRITE UR QL STRIP.AUTO: NEGATIVE
NRBC BLD-RTO: 0 /100 WBCS (ref 0–0)
P AXIS: 83 DEGREES
P OFFSET: 185 MS
P ONSET: 149 MS
PH UR STRIP.AUTO: 8 [PH]
PLATELET # BLD AUTO: 410 X10*3/UL (ref 150–450)
POTASSIUM SERPL-SCNC: 4.3 MMOL/L (ref 3.5–5.3)
PR INTERVAL: 148 MS
PROT SERPL-MCNC: 6.6 G/DL (ref 6.4–8.2)
PROT UR STRIP.AUTO-MCNC: NEGATIVE MG/DL
PROTHROMBIN TIME: 11.8 SECONDS (ref 9.8–12.8)
Q ONSET: 223 MS
QRS COUNT: 12 BEATS
QRS DURATION: 82 MS
QT INTERVAL: 422 MS
QTC CALCULATION(BAZETT): 455 MS
QTC FREDERICIA: 444 MS
R AXIS: 53 DEGREES
RBC # BLD AUTO: 4.02 X10*6/UL (ref 4–5.2)
RBC # UR STRIP.AUTO: NEGATIVE /UL
RH FACTOR (ANTIGEN D): NORMAL
SODIUM SERPL-SCNC: 137 MMOL/L (ref 136–145)
SP GR UR STRIP.AUTO: 1.04
T AXIS: 45 DEGREES
T OFFSET: 434 MS
UROBILINOGEN UR STRIP.AUTO-MCNC: NORMAL MG/DL
VENTRICULAR RATE: 70 BPM
WBC # BLD AUTO: 9 X10*3/UL (ref 4.4–11.3)

## 2024-02-20 PROCEDURE — 73110 X-RAY EXAM OF WRIST: CPT | Mod: RIGHT SIDE | Performed by: RADIOLOGY

## 2024-02-20 PROCEDURE — 71260 CT THORAX DX C+: CPT | Performed by: RADIOLOGY

## 2024-02-20 PROCEDURE — 2500000004 HC RX 250 GENERAL PHARMACY W/ HCPCS (ALT 636 FOR OP/ED): Performed by: STUDENT IN AN ORGANIZED HEALTH CARE EDUCATION/TRAINING PROGRAM

## 2024-02-20 PROCEDURE — 72128 CT CHEST SPINE W/O DYE: CPT | Mod: RCN

## 2024-02-20 PROCEDURE — 72131 CT LUMBAR SPINE W/O DYE: CPT | Performed by: RADIOLOGY

## 2024-02-20 PROCEDURE — 1100000001 HC PRIVATE ROOM DAILY

## 2024-02-20 PROCEDURE — 85025 COMPLETE CBC W/AUTO DIFF WBC: CPT | Performed by: EMERGENCY MEDICINE

## 2024-02-20 PROCEDURE — 70450 CT HEAD/BRAIN W/O DYE: CPT

## 2024-02-20 PROCEDURE — 81003 URINALYSIS AUTO W/O SCOPE: CPT | Performed by: EMERGENCY MEDICINE

## 2024-02-20 PROCEDURE — 72100 X-RAY EXAM L-S SPINE 2/3 VWS: CPT

## 2024-02-20 PROCEDURE — 2500000004 HC RX 250 GENERAL PHARMACY W/ HCPCS (ALT 636 FOR OP/ED)

## 2024-02-20 PROCEDURE — 73110 X-RAY EXAM OF WRIST: CPT | Mod: RT

## 2024-02-20 PROCEDURE — 72131 CT LUMBAR SPINE W/O DYE: CPT | Mod: RCN

## 2024-02-20 PROCEDURE — 93005 ELECTROCARDIOGRAM TRACING: CPT

## 2024-02-20 PROCEDURE — 72125 CT NECK SPINE W/O DYE: CPT | Performed by: RADIOLOGY

## 2024-02-20 PROCEDURE — 73090 X-RAY EXAM OF FOREARM: CPT | Mod: LT

## 2024-02-20 PROCEDURE — 36415 COLL VENOUS BLD VENIPUNCTURE: CPT | Performed by: EMERGENCY MEDICINE

## 2024-02-20 PROCEDURE — 99221 1ST HOSP IP/OBS SF/LOW 40: CPT | Performed by: ORTHOPAEDIC SURGERY

## 2024-02-20 PROCEDURE — 70450 CT HEAD/BRAIN W/O DYE: CPT | Performed by: RADIOLOGY

## 2024-02-20 PROCEDURE — 84075 ASSAY ALKALINE PHOSPHATASE: CPT | Performed by: EMERGENCY MEDICINE

## 2024-02-20 PROCEDURE — 2550000001 HC RX 255 CONTRASTS: Performed by: EMERGENCY MEDICINE

## 2024-02-20 PROCEDURE — 2500000002 HC RX 250 W HCPCS SELF ADMINISTERED DRUGS (ALT 637 FOR MEDICARE OP, ALT 636 FOR OP/ED): Mod: MUE

## 2024-02-20 PROCEDURE — 73090 X-RAY EXAM OF FOREARM: CPT | Mod: LEFT SIDE | Performed by: RADIOLOGY

## 2024-02-20 PROCEDURE — 72125 CT NECK SPINE W/O DYE: CPT

## 2024-02-20 PROCEDURE — 72128 CT CHEST SPINE W/O DYE: CPT | Performed by: RADIOLOGY

## 2024-02-20 PROCEDURE — 99223 1ST HOSP IP/OBS HIGH 75: CPT | Performed by: INTERNAL MEDICINE

## 2024-02-20 PROCEDURE — 99223 1ST HOSP IP/OBS HIGH 75: CPT | Performed by: SURGERY

## 2024-02-20 PROCEDURE — 73110 X-RAY EXAM OF WRIST: CPT | Mod: LT

## 2024-02-20 PROCEDURE — 2500000001 HC RX 250 WO HCPCS SELF ADMINISTERED DRUGS (ALT 637 FOR MEDICARE OP)

## 2024-02-20 PROCEDURE — 83735 ASSAY OF MAGNESIUM: CPT | Performed by: EMERGENCY MEDICINE

## 2024-02-20 PROCEDURE — 99222 1ST HOSP IP/OBS MODERATE 55: CPT | Performed by: NEUROLOGICAL SURGERY

## 2024-02-20 PROCEDURE — 72100 X-RAY EXAM L-S SPINE 2/3 VWS: CPT | Performed by: RADIOLOGY

## 2024-02-20 PROCEDURE — 85730 THROMBOPLASTIN TIME PARTIAL: CPT | Mod: 91 | Performed by: EMERGENCY MEDICINE

## 2024-02-20 PROCEDURE — 86900 BLOOD TYPING SEROLOGIC ABO: CPT | Mod: 91 | Performed by: EMERGENCY MEDICINE

## 2024-02-20 PROCEDURE — 74177 CT ABD & PELVIS W/CONTRAST: CPT | Performed by: RADIOLOGY

## 2024-02-20 PROCEDURE — 71260 CT THORAX DX C+: CPT

## 2024-02-20 PROCEDURE — 73110 X-RAY EXAM OF WRIST: CPT | Mod: LEFT SIDE | Performed by: RADIOLOGY

## 2024-02-20 RX ORDER — AMLODIPINE BESYLATE 5 MG/1
5 TABLET ORAL DAILY
Status: DISCONTINUED | OUTPATIENT
Start: 2024-02-20 | End: 2024-02-22

## 2024-02-20 RX ORDER — HYDRALAZINE HYDROCHLORIDE 20 MG/ML
5 INJECTION INTRAMUSCULAR; INTRAVENOUS ONCE
Status: COMPLETED | OUTPATIENT
Start: 2024-02-20 | End: 2024-02-20

## 2024-02-20 RX ORDER — SERTRALINE HYDROCHLORIDE 50 MG/1
50 TABLET, FILM COATED ORAL DAILY
Status: DISCONTINUED | OUTPATIENT
Start: 2024-02-20 | End: 2024-02-27 | Stop reason: HOSPADM

## 2024-02-20 RX ORDER — SERTRALINE HYDROCHLORIDE 50 MG/1
50 TABLET, FILM COATED ORAL DAILY
COMMUNITY

## 2024-02-20 RX ORDER — HYDRALAZINE HYDROCHLORIDE 20 MG/ML
INJECTION INTRAMUSCULAR; INTRAVENOUS
Status: COMPLETED
Start: 2024-02-20 | End: 2024-02-20

## 2024-02-20 RX ORDER — LEVETIRACETAM 5 MG/ML
500 INJECTION INTRAVASCULAR EVERY 12 HOURS
Status: COMPLETED | OUTPATIENT
Start: 2024-02-20 | End: 2024-02-22

## 2024-02-20 RX ORDER — ACETAMINOPHEN 325 MG/1
975 TABLET ORAL EVERY 6 HOURS SCHEDULED
Status: DISCONTINUED | OUTPATIENT
Start: 2024-02-20 | End: 2024-02-24

## 2024-02-20 RX ORDER — AMLODIPINE BESYLATE 5 MG/1
5 TABLET ORAL DAILY
COMMUNITY
End: 2024-02-27 | Stop reason: HOSPADM

## 2024-02-20 RX ADMIN — IOHEXOL 100 ML: 350 INJECTION, SOLUTION INTRAVENOUS at 00:56

## 2024-02-20 RX ADMIN — ACETAMINOPHEN 975 MG: 325 TABLET ORAL at 23:17

## 2024-02-20 RX ADMIN — LEVETIRACETAM 500 MG: 5 INJECTION INTRAVENOUS at 08:05

## 2024-02-20 RX ADMIN — HYDRALAZINE HYDROCHLORIDE 5 MG: 20 INJECTION INTRAMUSCULAR; INTRAVENOUS at 08:08

## 2024-02-20 RX ADMIN — ACETAMINOPHEN 975 MG: 325 TABLET ORAL at 18:43

## 2024-02-20 RX ADMIN — HYDRALAZINE HYDROCHLORIDE 5 MG: 20 INJECTION INTRAMUSCULAR; INTRAVENOUS at 13:32

## 2024-02-20 RX ADMIN — LEVETIRACETAM 500 MG: 5 INJECTION INTRAVENOUS at 18:43

## 2024-02-20 RX ADMIN — AMLODIPINE BESYLATE 5 MG: 5 TABLET ORAL at 18:43

## 2024-02-20 RX ADMIN — SERTRALINE HYDROCHLORIDE 50 MG: 50 TABLET ORAL at 18:43

## 2024-02-20 ASSESSMENT — PAIN SCALES - WONG BAKER: WONGBAKER_NUMERICALRESPONSE: NO HURT

## 2024-02-20 ASSESSMENT — PAIN SCALES - GENERAL: PAINLEVEL_OUTOF10: 0 - NO PAIN

## 2024-02-20 ASSESSMENT — ACTIVITIES OF DAILY LIVING (ADL): LACK_OF_TRANSPORTATION: NO

## 2024-02-20 ASSESSMENT — PAIN - FUNCTIONAL ASSESSMENT: PAIN_FUNCTIONAL_ASSESSMENT: WONG-BAKER FACES

## 2024-02-20 NOTE — CONSULTS
Orthopaedic Surgery Consult H&P    HPI:   Orthopaedic Problems/Injuries: L distal radius fracture  Other Injuries: L3 burst fracture, SDH    83 y.o. female PMH dementia, CVA on ASA/plavix, orthostatic hypotension presents after unwitnessed fall sustaining above. Ambulates with walker assistance however poorly complaint. Pain localized to L wrist and low back. Denies signficant pain elsewhere. Worse with movement. Improves with rest. Denies numbness, tingling to the left upper extremity.     Of note, patient sustained R distal radius fracture in 12/2023. This injury is currently being treated non-operatively by Dr. Hernandez at [facility] and she is in a short arm cast.     PMH: per above/EMR  PSH: per above/EMR  SocHx:      -  Denies tobacco use      -  Denies EtOH use      -  Denies other drug use  FamHx:  Non-contributory to this patient's acute orthopaedic problem.   Allergies: Reviewed in EMR  Meds: Reviewed in EMR    ROS      - 14 point ROS negative except as above    Physical Exam:  Gen: AOx3, NAD  HEENT: normocephalic atraumatic  Psych: appropriate mood and affect  Resp: nonlabored breathing  Cardiac: Extremities WWP, RRR to peripheral palpation  Neuro: CN 2-12 grossly intact  Skin: no rashes    L Hand:  - Skin: intact  - Painful at site of injury with notable swelling  - SILT M/U/R  - RoM: full pronation/supination  - Fires AIN/PIN/Ulnar distributions  - Fingertips pink/warm, cap refill < 2sec  - 2+ radial pulse  - Minimal pain with passive stretch of fingers  - Hand and Forearm compartments compressible  - No fusiform digital swelling noted  - No scissoring noted with full fist    R Hand:  - Short arm cast intact  - No acute injury/new onset pain to RUE  - SILT M/U/R  - RoM: full pronation/supination  - Fires AIN/PIN/Ulnar distributions  - Fingertips pink/warm, cap refill < 2sec    A full secondary exam was performed and all relevant findings discussed and noted above.    Imaging:  AP and lateral radiographs of the  L wrist display acute intra-articular distal radius fracture with minimal angulation however 3-4mm shortening and non-displaced ulnar styloid fracture.     Assessment:  Orthopaedic Problems/Injuries: L intra-articular distal radius fracture and ulnar styloid fracture    83F PMHx dementia, CVA on aspirin/plavix, and orthostatic hypotension, R distal radius fracture (12/2023 - managed non-op by Dr. Hernandez and currently in short arm cast) ambulates with walker assistance (poorly complaint) s/p GLF sustained above along with SDH and L3 burst fracture    Plan:  - No acute orthopaedic intervention  - WB: NWB LUE in STS, continue NWB RUE in short arm cast  - Abx: none indicated from orthopaedic hand perspective  - Diet: per primary team  - DVT: No chemoppx indicated from orthopaedic hand perspective      - Dispo: Per trauma team. Patient can follow up with Dr. Jackson as outpatient    Denton Duran MD  PGY-5 Orthopaedic Surgery    This patient was seen and staffed within 30 minutes of initial consult.  _________________________________________________________    This patient will be followed by the Hand Team while inpatient. Epic Chat preferred. See team members and contacts below:    Michel Brush, PGY-2  Mark Morrison, PGY-4

## 2024-02-20 NOTE — SIGNIFICANT EVENT
Imaging reviewed and is stable. No additional neurosurgical follow up needed    Okay for DVT ppx post bleed day 2  Okay for ASA post bleed day 7 and PLX post bleed day 10.     Will sign off at this time. Please page with any questions.

## 2024-02-20 NOTE — ED TRIAGE NOTES
Pt is a transfer from [facility] for a fall causing a fracture to her left radius after unwitnessed fall. Pt also has a hematoma to the left forehead with a possible history of blood thinners. Pt alert and oriented x1- which is baseline. Pt not able to give very much information, but came over with information from previous facility. According to report, pt daughter is concerned about amount of falls at home. No history of medications to reference.  Pt appears to be resting comfortably at this time. Pt denies any needs at this time.

## 2024-02-20 NOTE — PROGRESS NOTES
Pharmacy Medication History Review    Olesya Rodriguez is a 83 y.o. female admitted for No Principal Problem: There is no principal problem currently on the Problem List. Please update the Problem List and refresh.. Pharmacy reviewed the patient's sjazt-lr-[id number] medications and allergies for accuracy.    The list below reflects the updated PTA list. Comments regarding how patient may be taking medications differently can be found in the Admit Orders Activity    Prior to Admission Medications   Prescriptions Last Dose Informant Patient Reported?   EPINEPHrine 0.3 mg/0.3 mL injection syringe Unknown Child Yes   Sig: Inject 0.3 mL (0.3 mg) into the muscle 1 time if needed for anaphylaxis. Inject into upper leg. Call 911 after use.   amLODIPine (Norvasc) 5 mg tablet 2/19/2024 Child Yes   Sig: Take 1 tablet (5 mg) by mouth once daily.   sertraline (Zoloft) 50 mg tablet 2/19/2024 Child Yes   Sig: Take 1 tablet (50 mg) by mouth once daily.      Facility-Administered Medications: None          The list below reflects the updated allergy list. Please review each documented allergy for additional clarification and justification.  Allergies  Reviewed by Jordy Baltazar CPhT on 2/20/2024        Severity Reactions Comments    Chocolate Low Rash     Nut - Unspecified Low Rash     Shellfish Derived Low Rash             Patient accepts M2B at discharge. Pharmacy has been updated to ester.    Sources used to complete the med history include   2/20/2024 [facility] clinical summary   [facility] ( none )  Patient old mrn [phone]  Caregiver interview     Below are additional concerns with the patient's PTA list.  Patient daughter manages patient healthcare and medication   Spoke with nurse and was given medication list left by daughter   Daughters medication list confirms medications [facility]  2/20/2024 [facility] clinical summary has otc med list - unable to confirm otc med list   Otc med list -  acetaminophen 500 mg - adult multi-vitamin gummies - melatonin 3 mg -  lidocaine 5% ointment      Jordy Baltazar [facility]  Transitions of Care Pharmacy Technician  [facility]  Please reach out via Alerts Secure Chat for questions, or if no response call [phone] or vocera “MedRec”

## 2024-02-20 NOTE — CONSULTS
[facility]  Department of Orthopaedic Surgery  02/20/24      HPI:   Injury: L3 burst fx  HPI: 83 F (dementia, CVA (Asa/plavix), orthostatic hypotension, R DRFx 1/2024) p/an unwitnessed fall w above. Also w L DR fx and acute on chronic R subdural hematoma. On exam, 5/5 BLLE, SILT. RUE in cast, LUE limited due to STS, grossly moving bilateral shoulders and fingers. CT w L3 burst fx w/o retropulsion.  Plan: no acute ortho intervention. Uprights when able. Ortho spine to follow uprights.     PMH: per above/EMR  PSH: per above/EMR  SocHx:      - unable to obtain due to neuro status/dementia.   Allergies: Reviewed in EMR  Meds: Reviewed in EMR    ROS      - unable to obtain due to dementia    Physical Exam:  Gen: AOx1, NAD  HEENT: normocephalic, hematoma over left frontal bone  Psych: appropriate mood and affect  Resp: nonlabored breathing  Cardiac: Extremities WWP, RRR to peripheral palpation  Neuro: CN 2-12 grossly intact  Skin: no rashes    Spine Exam:    Unable to obtain upper extermity exam due to bilateral wrist splint/casts      L1: SILT       L2: SILT      Hip flexors 5/5 Left; 5/5 Right  L3: SILT      Knee extension 5/5 Left; 5/5 Right  L4: SILT      Tib Ant. (Dorsiflexion) 5/5 Left; 5/5 Right  L5: SILT      EHL 5/5 Left; 5/5 Right  S1: SILT      Plantarflexion 5/5 Left; 5/5 Right    Babinkski: Intact  No clonus    A full secondary exam was performed and all relevant findings discussed and noted above.    Imaging:  CT CTL spine displays L3 burst fx wo retropulsion.      Assessment:  Injury: L3 burst fx  HPI: 83 F (dementia, CVA (Asa/plavix), orthostatic hypotension, R DRFx 1/2024) p/an unwitnessed fall w above. Also w L DR fx and acute on chronic R subdural hematoma. On exam, 5/5 BLLE, SILT. RUE in cast, LUE limited due to STS, grossly moving bilateral shoulders and fingers. CT w L3 burst fx w/o retropulsion.  Plan: no acute ortho intervention. Uprights when able. Ortho spine  to follow uprights.        Plan:   - No acute orthopaedic surgical intervention  - No heavy lifting/twisting/bending >10lbs  - uprights when able; please notify spine team when complete  - Okay for diet from orthopedic perspective  - okay for DVT ppx from orthopedic perspective  - Patient to follow up in clinic with Dr. Woody in 1-2 weeks.  Please call [phone] to schedule appointment after discharge.    discussed with attending, Dr. Woody.       Nadia Manuel, DO  PGY-2 Orthopaedic Surgery  On-call Resident    This patient was seen and staffed within 30 minutes of initial consult.  _________________________________________________________    This patient will be followed by the Ortho Spine Team while inpatient. See team members and contacts below:     While admitted, this patient will be followed by the Ortho Spine Team. Please contact below residents with any questions (available via Voicendo).       Orthopaedic Spine Service  1st Call: Jhony Abraham (PGY-2)  2nd Call: Preet Woody (PGY-4)  Available by Epic Chat    From 6pm-7am, weekends, holidays, and if no answer and there is an emergent issue, please page orthopaedic consult pager, [phone].       I saw and evaluated the patient.  I personally obtained the key and critical portions of the history and physical exam or was physically present for key and critical portions performed by the Resident. I reviewed the documentation and discussed the patient with the Resident.  I agree with the Resident’s medical decision making as documented in the note.    82yo female with dementia s/p ground level fall with L3 burst fracture. Also with head bleed. No retropulsed bone. Stable fracture. Recommend closed management. Please obtain standing upright AP and Lateral XR L Spine when able. Follow up in clinic in 2-3 weeks or when discharged.

## 2024-02-20 NOTE — H&P
[facility]  TRAUMA SERVICE - HISTORY AND PHYSICAL / CONSULT    Patient Name: Olesya Rodriguez  MRN: [id number]  Admit Date: 219  : 1940  AGE: 83 y.o.   GENDER: female  ==============================================================================  MECHANISM OF INJURY / CHIEF COMPLAINT:   84yo female unwitnessed fall    LOC (yes/no?): Unknown  Anticoagulant / Anti-platelet Rx? (for what dx?): ASA Plavix Past CVA  Referring Facility Name (N/A for scene EMR run): [facility]     INJURIES:   L Distal Radius fx  L ulnar styloid fx  Acute, comminuted burst type compression fracture of the L3 vertebral body with approximately 40% vertebral body height loss.  Acute on chronic right subdural hematoma measuring up to 9 mm in maximal thickness with minimal adjacent mass effect. No midline shift.    OTHER MEDICAL PROBLEMS:  orthostatic hypotension   CVA unknown if residual deficit  Vascular Dementia  Hypertension    INCIDENTAL FINDINGS:  Cystic Pancreatic lesion     ==============================================================================  ADMISSION PLAN OF CARE:  Assessment:    PLAN:    ##Admit to ***    Acute on Chronic SDH  -Repeat CTH 6 hours post (0600)   -Hold ASA Plavix, Chemical DVT prophylaxis    Burst Fx L3  -Spine Recs pending       - Analgesia: Scheduled Tylenol,  Lidoderm patch, Toradol, Gabapentin, Oxy 5/10 Q4h PRN, Dilaudid BT  - PT/OT when appropriate        ##FEN/GI/  - NPO Diet  - BR with Sophy-Colace and Miralax  - Monitor electrolytes and replete as clinically indicated  - Monitor fluid status and replete as clinically indicated  - Voiding spontaneously   - Strict I/Os  - AM labs    ##PPX  -Hold Chemical ISO SDH   - SCDs    Dispo:     ***      Miguel Fontenot MD  Trauma, Critical Care, and Acute Care Surgery  Floor: [id number]   TSU: [id number]       ==============================================================================  PAST MEDICAL HISTORY:   PMH:   Past Medical  History:   Diagnosis Date   • Cataract    • Cataract     bilateral   • CVA (cerebral vascular accident) (CMS/[facility]) 11/15/2017    left thalamic   • Syncope 11/15/2011   • Vascular dementia of acute onset without behavioral disturbance (CMS/[facility])          PSH:   Past Surgical History:   Procedure Laterality Date   • ANKLE SURGERY Left    • CATARACT EXTRACTION Right     Dr. Logan   • CHOLECYSTECTOMY       FH:   No family history on file.  SOCIAL HISTORY:    Smoking: Former   Social History     Tobacco Use   Smoking Status Former   • Years: 8   • Types: Cigarettes   • Quit date: 1954   • Years since quittin.2   Smokeless Tobacco Never       Alcohol: Denied   Social History     Substance and Sexual Activity   Alcohol Use Not on file       Drug use: Denied     MEDICATIONS:   ASA  Plavix  Prior to Admission medications    Not on File     ALLERGIES:   Allergies   Allergen Reactions   • Chocolate Rash   • Nut - Unspecified Rash   • Shellfish Derived Rash       REVIEW OF SYSTEMS:  Review of Systems   Unable to perform ROS: Dementia     PHYSICAL EXAM:  PRIMARY SURVEY:  Airway  Airway is patent.     Breathing  Breathing is normal. Right breath sounds are normal. Left breath sounds are normal.     Circulation  Cardiac rhythm is regular. Rate is regular.   Pulses  Radial: on the right; 2+ on the left.  Pedal: 2+ on the right; 2+ on the left.    Disability  [place] Coma Score  Eye:4   Verbal:5   Motor:6      15  Pupils  Right Pupil:   round and reactive        Left Pupil:   round           Motor Strength   strength:  5/5 on the right  5/5 on the left  Dorsiflex strength:  5/5 on the right  5/5 on the left  Plantarflex strength:  5/5 on the right  5/5 on the left      SECONDARY SURVEY/PHYSICAL EXAM:  Physical Exam  Constitutional:       General: She is not in acute distress.     Appearance: She is not ill-appearing.   HENT:      Head:      Comments: L frontal old hematoma     Right Ear: External ear normal.      Left  "Ear: External ear normal.      Nose: Nose normal.      Mouth/Throat:      Mouth: Mucous membranes are moist.      Pharynx: Oropharynx is clear.   Eyes:      General:         Right eye: No discharge.         Left eye: No discharge.      Extraocular Movements: Extraocular movements intact.      Pupils: Pupils are equal, round, and reactive to light.   Cardiovascular:      Rate and Rhythm: Normal rate and regular rhythm.      Pulses: Normal pulses.      Heart sounds: Normal heart sounds.   Pulmonary:      Effort: Pulmonary effort is normal.      Breath sounds: Normal breath sounds.   Abdominal:      General: Abdomen is flat.      Palpations: Abdomen is soft.      Tenderness: There is no abdominal tenderness. There is no guarding or rebound.   Musculoskeletal:      Cervical back: Neck supple. No rigidity.      Comments: No tenderness to palpation L wrist   Skin:     Capillary Refill: Capillary refill takes less than 2 seconds.   Neurological:      General: No focal deficit present.      Mental Status: She is alert. Mental status is at baseline. She is disoriented.      Cranial Nerves: No cranial nerve deficit.      Sensory: No sensory deficit.      Motor: No weakness.   Psychiatric:         Mood and Affect: Mood normal.         Behavior: Behavior normal.     IMAGING SUMMARY:  (summary of findings, not a copy of dictation)  CT Head/Face: cute on chronic right subdural hematoma measuring up to 9 mm in maximal thickness with minimal adjacent mass effect. No midline shift.  CT C-Spine: No acute fracture  CT Chest/Abd/Pelvis: Pancreatic mass  CXR/PXR: No acute process  Other(s): L distal radius fx, L ulnar styloid fx    LABS:                No lab exists for component: \"LABALBU\"            I have reviewed all laboratory and imaging results ordered/pertinent for this encounter.        "

## 2024-02-20 NOTE — CONSULTS
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 Non-specific mild white matter changes and generalized parenchymal  volume loss.          I personally reviewed the images/study, and I agree with the findings  as stated above. This study was interpreted at [facility], [place], [place].      MACRO:  Temitope Oates discussed the significance and urgency of this critical  finding by telephone with Dr. Oskar Rod MD on 2/20/2024 at 12:17  am.  (**-RCF-**) Findings:  See findings.          Signed by: Sania Gandhi 2/20/2024 12:49 AM  Dictation workstation:   [id number]    Head/Brain Imaging  === Results for orders placed during the hospital encounter of 02/19/24 ===    CT head wo IV contrast [[id number]] 02/20/2024    Status: Normal  1. Interval worsening of right hemispheric subdural hematoma  measuring up to 1.0 cm in maximal thickness, previously measuring up  to 0.9 cm there is persistent associated local mass effect with at  most minimal bowing of the septum pellucidum to the left.  2. Nonspecific periventricular and subcortical white matter changes  most likely representing small-vessel ischemic disease  in a patient  of this age with generalized parenchymal volume loss, similar when  compared to prior exam.    I personally reviewed the images/study, and I agree with the findings  as stated above.    MACRO:  Temitope Oates discussed the significance and urgency of this critical  finding by telephone with KATHLEEN WADDELL on 2/20/2024 at 7:11 am.  (**-RCF-**) Findings:  See findings.      Signed by: Kathleen Urrutia 2/20/2024 7:45 AM  Dictation workstation:   [id number]    DATA:  EKG: QTC - 422  Anti-psychotics in 48 hours: none  Opioids/Benzodiazepines in 48 hours: none  Anticholinergics on board: none  Restraints: none  Indwelling catheters: none  Last BM: not recorded  UO in 24 hours:  not recorded  Activity in the past 24 hours: none  Need for ambulatory devices: uses walker at home    Assessment/Plan   This is a/an 83 y.o. year old female, with past medical history relevant for dementia, CVA, orthostatic hypotension, presenting for unwitnessed fall R DRFx 1/2024), Also w L DR fx and acute on chronic R subdural hematoma , being seen in geriatric consultation for dementia with severe deconditioning.     Active Problems:  There are no active Hospital Problems.    #. Acute fall unclear etiology. H/o orthostatic hypotension   Plan:    - wait for PT/OT eval, patient could benefit from rehab.    - Check orthostatic vitals   - Order Vitamin D   - Consider telemetry monitoring    #. L - comminuted fracture of distal radius  Plan:    -Per orthopedic team, no acute intervention indicated.   - No heavy lifting/twisting/bending >10lbs  - uprights when able   - Start Tylenol 975mg TID for pain control   - Consider 2.5 oxy q6 PRN.     #. L-sided subdural hematoma  Plan:    - Per neurosurgery, repeat CT and holding Plavix and ASA.     #. Dementia, mild to moderate, suspected vascular at baseline with deconditioning. No MoCa found on file.  Plan:   - Order baseline TSH, Vitamin B12.   - Discuss placement and code status with  POA.    #. Patient at risk for delirium   Please consider the following general measures for minimizing delirium in a hospitalized patient:   -Bright lights during the day, keeps blinds up, switch all lights on   -avoid disturbances at night. Encourage at least 6 hours uninterrupted sleep. Consider d/c 4am vitals check  -avoid benzodiazepines, sedatives. Minimize opioids   -avoid anti-cholinergics    -avoid restraints.   -Seroquel 12.5mg BID or low dose haldol 0.5mg PO (IM if PO not possible) only PRN for severe agitation where pt exhibits volatile behavior and is a threat to self or others. EKGs to monitor QTc   -daily orientation to time and place by the staff   -out of bed to chair few hours everyday  - encourage stimulating activities during the day if possible    #Medical Decision making capacity   - Patient lacks capacity to make decisions. Does not understand why she is in the hospital. Lacks capacity to appreciate her condition and was not able to provide logical reasoning for why she fell.     Medication Evaluation:   High risk medications: none  Other concerning issues regarding medications: none    Geriatric medicine will continue to follow the patient. Thank you for allowing geriatric medicine to be involved in the care of your patient. Geriatric medicine consultation team is available during work hours Monday through Friday. For any emergency issues requiring immediate assistance over the weekend, please contact Geriatric pager [phone].    WALT CAMACHO, MS4    I saw and evaluated the patient.  I personally obtained the key and critical portions of the history and physical exam or was physically present for key and critical portions performed by the resident. I reviewed the resident’s documentation and discussed the patient with the resident.  I agree with the resident’s medical decision making as documented in their note with the exception/addition of the following:     Noted that patient was on  amlodipine 2.5mg daily per most recent PCP note.   Patient has mild-moderate dementia, vascular, dependent on daughter for all IADLs and some BADLs, has a walker for ambulation but usually does not use.   BP now elevated. Not sure if there is a component of agitation as patient appeared to be slowly getting hyperactive on exam  Ok with current amlodipine 5mg.   Depending on orthostatic vitals check, will evaluate need to decrease amlodipine to 2.5mg or not.   Will continue to follow.     Daisy Guillory MD

## 2024-02-20 NOTE — ED NOTES
Contact number for Daughter Liza is    [phone]    Called and spoke to daughters Liza and Jacqueline. Daughters state that the pt has been having falls at home, but they seem to be better after she was started on amlodipine. Daughters state that the pt is supposed to be utilizing a walker but that the pt often walks without it.  Currently the pt lives at home with her 2 daughters, and has a stable routine. Sisters state that the pt may benefit from rehab for PTOT.  Daughters agree that they would like the pt to remain in the home with them as long as it is deemed safe for her. They state that the pt is happy at home, and it is what the pt would want. The pt does not have a formal POA, but the daughters have been making the majority of her healthcare decisions. They do state that they have 3 older brothers that do not assist much with the pt's care.  They also voiced a code status change, but they were informed that a physician would discuss this further.  All questions and concerns addressed at this time.     Felicita Pfeiffer RN  02/20/24 0025

## 2024-02-20 NOTE — CONSULTS
Reason For Consult  Traumatic SDH    History Of Present Illness  Olesya Rodriguez is a 83 y.o. female h/o dementia, CVA on ASA/PLX, orthostatic hypotension, s/p unwitnessed fall, CTH R acute on chronic non compressive SDH. Neurosurgery consulted.  Patient's fall was unwitnessed.  Per chart review, patient lives at home with her daughters and is supposed to be using a walker however has been noncompliant.      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    Surgical History  She has a past surgical history that includes Cholecystectomy; Cataract extraction (Right); and Ankle surgery (Left).     Social History  She reports that she quit smoking about 69 years ago. Her smoking use included cigarettes. She has never used smokeless tobacco. She reports that she does not use drugs. No history on file for alcohol use.    Family History  No family history on file.     Allergies  Chocolate, Nut - unspecified, and Shellfish derived    Review of Systems  Negative 12 point review of systems other than stated in HPI      Physical Exam  GEN: Healthy appearing  PSYCH: Aox1 (to name)   CV: well perfused   LUNGS: breathing comfortably on room air   Neuro:   Awake, Ox1  Able to hold conversation   Face symmetric  Fcx4 with symmetric strength        Last Recorded Vitals  Blood pressure 169/78, pulse 66, temperature 36.6 °C (97.8 °F), resp. rate 19, SpO2 98 %.    Relevant Results  CTH R acute on chronic non compressive SDH, most likely traumatic given history to no evidence of brain compression      Assessment/Plan     Olesya Rodriguez is a 83 y.o. female h/o dementia, CVA on ASA/PLX, orthostatic hypotension, s/p unwitnessed fall, CTH R acute on chronic non compressive SDH.     Recs:   Obtain repeat CT head 6 hours after original scan for stability  Hold  aspirin and Plavix  Will continue to follow    Plan was discussed with chief resident and attending Dr. Adamson. Recommendations not final until note signed by attending.         Lukas Nova MD

## 2024-02-20 NOTE — ED PROVIDER NOTES
HPI   Chief Complaint   Patient presents with    Fall       The patient is a 84 y/o female with PMH including CVA (on Plavix and Asprin), dementia (AAOx1), cholecystectomy, ankle surgery, presenting as transfer from [facility] after an unwitnessed fall. The patient has a history of frequent falls and orthostatic hypotension. Patient does not use drugs, ETOH and has allergies to shellfish, nuts, and chocolate.     At the time of exam, the patient reports some left-sided wrist pain and is unable to furnish any additional meaningful history due to patient history of dementia. She denies any pain anywhere else. She denies any headache, dizziness, vision changes, neck pain, back pain, chest pain, shortness of breath, nausea, vomiting, abdominal pain, urinary symptoms, numbness, tingling, fevers, or chills. She is unable to describe the events surrounding her fall. She is otherwise comfortable appearing.                          Minal Coma Scale Score: 14         NIH Stroke Scale: 1             Patient History   Past Medical History:   Diagnosis Date    Cataract     Cataract     bilateral    CVA (cerebral vascular accident) (CMS/[facility]) 11/15/2017    left thalamic    Syncope 11/15/2011    Vascular dementia of acute onset without behavioral disturbance (CMS/[facility])      Past Surgical History:   Procedure Laterality Date    ANKLE SURGERY Left     CATARACT EXTRACTION Right     Dr. Logan    CHOLECYSTECTOMY       No family history on file.  Social History     Tobacco Use    Smoking status: Former     Years: 8     Types: Cigarettes     Quit date: 1954     Years since quittin.2    Smokeless tobacco: Never   Substance Use Topics    Alcohol use: Not on file    Drug use: Never       Physical Exam   ED Triage Vitals   Temp Pulse Resp BP   -- -- -- --      SpO2 Temp src Heart Rate Source Patient Position   -- -- -- --      BP Location FiO2 (%)     -- --       Physical Exam  Constitutional:       General: She is not in acute  distress.     Comments: Frail appearing elderly female. Resting calmly.   HENT:      Head: Normocephalic and atraumatic.      Nose: Nose normal.      Mouth/Throat:      Mouth: Mucous membranes are moist.   Eyes:      General: No scleral icterus.     Extraocular Movements: Extraocular movements intact.      Pupils: Pupils are equal, round, and reactive to light.   Cardiovascular:      Rate and Rhythm: Normal rate and regular rhythm.      Heart sounds: Normal heart sounds. No murmur heard.  Pulmonary:      Effort: Pulmonary effort is normal. No respiratory distress.      Breath sounds: Normal breath sounds. No wheezing.   Abdominal:      General: There is no distension.      Palpations: Abdomen is soft.      Tenderness: There is no abdominal tenderness. There is no guarding or rebound.   Musculoskeletal:      Cervical back: Normal range of motion and neck supple. No tenderness.      Right lower leg: No edema.      Left lower leg: No edema.      Comments: Left arm in a splint,  right forearm in cast.   Skin:     General: Skin is warm and dry.   Neurological:      General: No focal deficit present.      Mental Status: She is alert. Mental status is at baseline. She is disoriented.      Cranial Nerves: No cranial nerve deficit.      Sensory: No sensory deficit.      Comments: Strength symmetric in upper and lower extremities bilaterally.   Psychiatric:         Mood and Affect: Mood normal.         Behavior: Behavior normal.         ED Course & [facility]   ED Course as of 02/20/24 1537   Tue Feb 20, 2024   0720 Received call from radiologist, reporting a worsening of the acute component of subdural.  Consulted neurosurgery recommending for another repeat scan in 6 hours, with Keppra dosing 500 mg IV every 12 hours [VH]   1434 Briefly, patient is a 83-year-old female with history of dementia, frequent falls transferred here from [facility] for evaluation status post fall.  Patient with an L3 compression fracture with 40% height loss,  left distal radius fracture and subdural hemorrhages.  Patient received Cts x 3 with stable bleed.  Accepted for admission to trauma floor.  Neurosurgery recommended Keppra which has been dosed. [EG]      ED Course User Index  [EG] Maggy Sarah MD  [VH] Brandon Aguilar,          Diagnoses as of 02/20/24 1537   Subdural hematoma (CMS/HCC)   Fall, initial encounter   Closed compression fracture of L3 lumbar vertebra, sequela   Closed fracture of distal end of left radius, unspecified fracture morphology, initial encounter       Medical Decision Making  The patient presents to the ED as a transfer from outside hospital ED for trauma consultation.  She is hemodynamically stable on arrival to the ED. The patient is in no respiratory distress, satting well on room air. We were unable to view the actual images performed at OSH despite attempts at obtaining them. Thus, CT pan scan and plain films of the LUE were obtained for further evaluation. Trauma surgery was consulted for patient evaluation.     Repeat laboratory studies obtained and been grossly unremarkable, CBC and CMP unremarkable.    CT scan of the head demonstrates acute on chronic right subdural hematoma for which Neurosurgery was consulted for patient evaluation. Plain films are notable for acute comminuted and impacted fracture of the left distal radius with probable intra-articular extension. Patient also has acute, mildly distracted left ulnar styloid fracture. Orthopedic hand surgery was consulted for patient evaluation given these findings. Patient was offered pain medication but denied needing anything at this time.     Neurosurgery evaluated the patient in the ED with recommendation for repeat scan stability scan 6 hours after prior CT.  This was ordered for 0600.  CT chest abdomen pelvis was notable for L3 vertebral compression fracture with approximately 40% vertebral body height loss.  Orthopedic surgery consulted who is covering spine surgery.  On  repeat assessment, patient is able to plantar and dorsiflex at the foot and great toe bilaterally with 5/5 strength with no sensory deficits.     The repeat CT head scan demonstrates worsening of the acute component of the subdural hematoma, for which neurosurgery contacted again given the new findings, with recommendation for repeat CT scan in another 6 hours and starting seizure prophylaxis, Keppra 500 mg IV twice daily which was ordered. Patient was given a dose of 5 mg IV hydralazine for blood pressure control in the setting of SDH. The patient signed out to oncoming resident physician at 7 AM pending repeat stability scan and further neurosurgery, Orthopedic, and trauma surgery recommendations. Patient will need to be admitted to the hospital for further management and monitoring but her final disposition is pending at this time.     Discussed with the attending  Brandon Aguilar DO PGY-4  Emergency Medicine          Procedure  Procedures     Brandon Aguilar DO  Resident  02/20/24 7434    I saw and evaluated the patient. I personally obtained the key and critical portions of the history and physical exam or was physically present for key and critical portions performed by the resident/fellow. I reviewed the resident/fellow's documentation and discussed the patient with the resident/fellow. I agree with the resident/fellow's medical decision making as documented in the note. Patient was signed out to my colleague, Dr. Tino Gutierrez at 7 AM pending repeat CT head and pending final Neurosurgery, Orthopedic, and Trauma recommendations.     MD Kathleen Jimenez MD  02/21/24 1262

## 2024-02-21 LAB
ANION GAP SERPL CALC-SCNC: 16 MMOL/L (ref 10–20)
BUN SERPL-MCNC: 17 MG/DL (ref 6–23)
CALCIUM SERPL-MCNC: 10 MG/DL (ref 8.6–10.6)
CHLORIDE SERPL-SCNC: 97 MMOL/L (ref 98–107)
CO2 SERPL-SCNC: 28 MMOL/L (ref 21–32)
CREAT SERPL-MCNC: 0.82 MG/DL (ref 0.5–1.05)
EGFRCR SERPLBLD CKD-EPI 2021: 71 ML/MIN/1.73M*2
ERYTHROCYTE [DISTWIDTH] IN BLOOD BY AUTOMATED COUNT: 12.5 % (ref 11.5–14.5)
GLUCOSE BLD MANUAL STRIP-MCNC: 91 MG/DL (ref 74–99)
GLUCOSE SERPL-MCNC: 80 MG/DL (ref 74–99)
HCT VFR BLD AUTO: 42.8 % (ref 36–46)
HGB BLD-MCNC: 14.1 G/DL (ref 12–16)
HOLD SPECIMEN: NORMAL
MAGNESIUM SERPL-MCNC: 2.15 MG/DL (ref 1.6–2.4)
MCH RBC QN AUTO: 29.9 PG (ref 26–34)
MCHC RBC AUTO-ENTMCNC: 32.9 G/DL (ref 32–36)
MCV RBC AUTO: 91 FL (ref 80–100)
NRBC BLD-RTO: 0 /100 WBCS (ref 0–0)
PLATELET # BLD AUTO: 469 X10*3/UL (ref 150–450)
POTASSIUM SERPL-SCNC: 4.8 MMOL/L (ref 3.5–5.3)
RBC # BLD AUTO: 4.71 X10*6/UL (ref 4–5.2)
SODIUM SERPL-SCNC: 136 MMOL/L (ref 136–145)
WBC # BLD AUTO: 8.2 X10*3/UL (ref 4.4–11.3)

## 2024-02-21 PROCEDURE — 2500000001 HC RX 250 WO HCPCS SELF ADMINISTERED DRUGS (ALT 637 FOR MEDICARE OP)

## 2024-02-21 PROCEDURE — 2500000002 HC RX 250 W HCPCS SELF ADMINISTERED DRUGS (ALT 637 FOR MEDICARE OP, ALT 636 FOR OP/ED): Mod: MUE

## 2024-02-21 PROCEDURE — 97165 OT EVAL LOW COMPLEX 30 MIN: CPT | Mod: GO

## 2024-02-21 PROCEDURE — 97162 PT EVAL MOD COMPLEX 30 MIN: CPT | Mod: GP

## 2024-02-21 PROCEDURE — 82947 ASSAY GLUCOSE BLOOD QUANT: CPT

## 2024-02-21 PROCEDURE — 85027 COMPLETE CBC AUTOMATED: CPT

## 2024-02-21 PROCEDURE — 80048 BASIC METABOLIC PNL TOTAL CA: CPT

## 2024-02-21 PROCEDURE — 83735 ASSAY OF MAGNESIUM: CPT

## 2024-02-21 PROCEDURE — 2500000004 HC RX 250 GENERAL PHARMACY W/ HCPCS (ALT 636 FOR OP/ED)

## 2024-02-21 PROCEDURE — 99232 SBSQ HOSP IP/OBS MODERATE 35: CPT | Performed by: INTERNAL MEDICINE

## 2024-02-21 PROCEDURE — 36415 COLL VENOUS BLD VENIPUNCTURE: CPT

## 2024-02-21 PROCEDURE — 1100000001 HC PRIVATE ROOM DAILY

## 2024-02-21 RX ORDER — ENOXAPARIN SODIUM 100 MG/ML
40 INJECTION SUBCUTANEOUS DAILY
Status: DISCONTINUED | OUTPATIENT
Start: 2024-02-21 | End: 2024-02-27 | Stop reason: HOSPADM

## 2024-02-21 RX ORDER — AMOXICILLIN 250 MG
1 CAPSULE ORAL NIGHTLY
Status: DISCONTINUED | OUTPATIENT
Start: 2024-02-21 | End: 2024-02-27 | Stop reason: HOSPADM

## 2024-02-21 RX ORDER — LEVETIRACETAM 500 MG/1
500 TABLET ORAL 2 TIMES DAILY
Status: DISCONTINUED | OUTPATIENT
Start: 2024-02-22 | End: 2024-02-27 | Stop reason: HOSPADM

## 2024-02-21 RX ADMIN — ACETAMINOPHEN 975 MG: 325 TABLET ORAL at 11:41

## 2024-02-21 RX ADMIN — ACETAMINOPHEN 975 MG: 325 TABLET ORAL at 06:05

## 2024-02-21 RX ADMIN — AMLODIPINE BESYLATE 5 MG: 5 TABLET ORAL at 08:23

## 2024-02-21 RX ADMIN — SERTRALINE HYDROCHLORIDE 50 MG: 50 TABLET ORAL at 08:23

## 2024-02-21 RX ADMIN — LEVETIRACETAM 500 MG: 5 INJECTION INTRAVENOUS at 13:31

## 2024-02-21 RX ADMIN — ENOXAPARIN SODIUM 40 MG: 100 INJECTION SUBCUTANEOUS at 14:53

## 2024-02-21 RX ADMIN — SENNOSIDES AND DOCUSATE SODIUM 1 TABLET: 8.6; 5 TABLET ORAL at 21:53

## 2024-02-21 RX ADMIN — ACETAMINOPHEN 975 MG: 325 TABLET ORAL at 17:00

## 2024-02-21 SDOH — SOCIAL STABILITY: SOCIAL INSECURITY: ARE YOU OR HAVE YOU BEEN THREATENED OR ABUSED PHYSICALLY, EMOTIONALLY, OR SEXUALLY BY ANYONE?: UNABLE TO ASSESS

## 2024-02-21 SDOH — SOCIAL STABILITY: SOCIAL INSECURITY: DO YOU FEEL UNSAFE GOING BACK TO THE PLACE WHERE YOU ARE LIVING?: UNABLE TO ASSESS

## 2024-02-21 SDOH — SOCIAL STABILITY: SOCIAL INSECURITY: DO YOU FEEL ANYONE HAS EXPLOITED OR TAKEN ADVANTAGE OF YOU FINANCIALLY OR OF YOUR PERSONAL PROPERTY?: UNABLE TO ASSESS

## 2024-02-21 SDOH — SOCIAL STABILITY: SOCIAL INSECURITY: DOES ANYONE TRY TO KEEP YOU FROM HAVING/CONTACTING OTHER FRIENDS OR DOING THINGS OUTSIDE YOUR HOME?: UNABLE TO ASSESS

## 2024-02-21 SDOH — SOCIAL STABILITY: SOCIAL INSECURITY: ARE THERE ANY APPARENT SIGNS OF INJURIES/BEHAVIORS THAT COULD BE RELATED TO ABUSE/NEGLECT?: UNABLE TO ASSESS

## 2024-02-21 SDOH — SOCIAL STABILITY: SOCIAL INSECURITY: HAVE YOU HAD THOUGHTS OF HARMING ANYONE ELSE?: UNABLE TO ASSESS

## 2024-02-21 SDOH — SOCIAL STABILITY: SOCIAL INSECURITY: HAS ANYONE EVER THREATENED TO HURT YOUR FAMILY OR YOUR PETS?: UNABLE TO ASSESS

## 2024-02-21 SDOH — SOCIAL STABILITY: SOCIAL INSECURITY: ABUSE: ADULT

## 2024-02-21 ASSESSMENT — COLUMBIA-SUICIDE SEVERITY RATING SCALE - C-SSRS
1. IN THE PAST MONTH, HAVE YOU WISHED YOU WERE DEAD OR WISHED YOU COULD GO TO SLEEP AND NOT WAKE UP?: NO
6. HAVE YOU EVER DONE ANYTHING, STARTED TO DO ANYTHING, OR PREPARED TO DO ANYTHING TO END YOUR LIFE?: NO
2. HAVE YOU ACTUALLY HAD ANY THOUGHTS OF KILLING YOURSELF?: NO

## 2024-02-21 ASSESSMENT — COGNITIVE AND FUNCTIONAL STATUS - GENERAL
HELP NEEDED FOR BATHING: TOTAL
WALKING IN HOSPITAL ROOM: TOTAL
MOVING TO AND FROM BED TO CHAIR: A LOT
STANDING UP FROM CHAIR USING ARMS: A LOT
DAILY ACTIVITIY SCORE: 6
EATING MEALS: TOTAL
DRESSING REGULAR LOWER BODY CLOTHING: TOTAL
CLIMB 3 TO 5 STEPS WITH RAILING: TOTAL
TOILETING: TOTAL
PERSONAL GROOMING: TOTAL
MOBILITY SCORE: 10
DRESSING REGULAR UPPER BODY CLOTHING: TOTAL
MOVING FROM LYING ON BACK TO SITTING ON SIDE OF FLAT BED WITH BEDRAILS: A LOT
TURNING FROM BACK TO SIDE WHILE IN FLAT BAD: A LOT

## 2024-02-21 ASSESSMENT — PAIN - FUNCTIONAL ASSESSMENT
PAIN_FUNCTIONAL_ASSESSMENT: 0-10

## 2024-02-21 ASSESSMENT — ACTIVITIES OF DAILY LIVING (ADL)
TOILETING: DEPENDENT
BATHING: DEPENDENT
GROOMING: DEPENDENT
HEARING - LEFT EAR: FUNCTIONAL
PATIENT'S MEMORY ADEQUATE TO SAFELY COMPLETE DAILY ACTIVITIES?: NO
ADEQUATE_TO_COMPLETE_ADL: YES
DRESSING YOURSELF: DEPENDENT
FEEDING YOURSELF: NEEDS ASSISTANCE
JUDGMENT_ADEQUATE_SAFELY_COMPLETE_DAILY_ACTIVITIES: NO
HEARING - RIGHT EAR: FUNCTIONAL
WALKS IN HOME: NEEDS ASSISTANCE
ASSISTIVE_DEVICE: WALKER

## 2024-02-21 ASSESSMENT — PAIN SCALES - GENERAL
PAINLEVEL_OUTOF10: 0 - NO PAIN

## 2024-02-21 ASSESSMENT — LIFESTYLE VARIABLES
SKIP TO QUESTIONS 9-10: 1
HOW MANY STANDARD DRINKS CONTAINING ALCOHOL DO YOU HAVE ON A TYPICAL DAY: PATIENT DOES NOT DRINK
AUDIT-C TOTAL SCORE: 0
HOW OFTEN DO YOU HAVE A DRINK CONTAINING ALCOHOL: NEVER
AUDIT-C TOTAL SCORE: 0
HOW OFTEN DO YOU HAVE 6 OR MORE DRINKS ON ONE OCCASION: NEVER

## 2024-02-21 ASSESSMENT — PATIENT HEALTH QUESTIONNAIRE - PHQ9
1. LITTLE INTEREST OR PLEASURE IN DOING THINGS: NOT AT ALL
2. FEELING DOWN, DEPRESSED OR HOPELESS: NOT AT ALL
SUM OF ALL RESPONSES TO PHQ9 QUESTIONS 1 & 2: 0

## 2024-02-21 NOTE — PROGRESS NOTES
Occupational Therapy    Evaluation    Patient Name: Olesya Rodriguez  MRN: [id number]  Today's Date: 2/21/2024  Time Calculation  Start Time: 0831  Stop Time: 0844  Time Calculation (min): 13 min    Assessment  IP OT Assessment  OT Assessment: Pt presents with decreased strength, endurance, and balance with impaired cognition and now NWB B UE impairing occupational performance of ADLs and functional mobility  Evaluation/Treatment Tolerance: Patient tolerated treatment well  End of Session Communication: Bedside nurse  End of Session Patient Position: Bed, 3 rail up, Alarm on  Plan:  Treatment Interventions: ADL retraining, Functional transfer training, Cognitive reorientation, Compensatory technique education  OT Frequency: 2 times per week  OT Discharge Recommendations: Moderate intensity level of continued care. Dtr's prefer [facility]  OT - OK to Discharge: Yes (indicates completed eval and discharge recommendation)    Subjective   Current Problem:  1. Subdural hematoma (CMS/[facility])        2. Fall, initial encounter        3. Closed compression fracture of L3 lumbar vertebra, sequela        4. Closed fracture of distal end of left radius, unspecified fracture morphology, initial encounter          General:  Reason for Referral: s/p fall with injuries1. L Distal Radius fx  2. L ulnar styloid fx  3. Acute, comminuted burst type compression fracture of the L3 vertebral body with approximately 40% vertebral body height loss. 4. Acute on chronic right subdural hematoma measuring up to 9 mm in maximal thickness with minimal adjacent mass effect. No midline shift.  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, Syncope 11/15/2011, Vascular dementia of acute onset without behavioral disturbance (CMS/[facility])  Co-Treatment: PT  Co-Treatment Reason: maximize Pt safety and therapeutic success; Pt with multiple WB precautions  Prior to Session Communication:  Bedside nurse  Patient Position Received: Bed, 3 rail up, Alarm on  General Comment: pleasantly confused and cooperative, agreeable to OT   Precautions:  UE Weight Bearing Status: Right Non-Weight Bearing, Left Non-Weight Bearing  Precautions Comment: sling donned L UE    Pain:  Pain Assessment  Pain Assessment: 0-10  Pain Score: 0 - No pain  Lines/Tubes/Drains:   External catheter      Objective   Cognition:  Overall Cognitive Status: Impaired at baseline  Orientation Level:  (oriented to self only)  Following Commands: Follows all commands and directions without difficulty  Safety Judgment: Decreased awareness of need for safety precautions  Problem Solving: Assistance required to identify errors made  Sustained Attention: Impaired  Short-Term Memory: Impaired  Working Memory: Impaired  Insight: Moderate  Impulsive: Mildly           Home Living:  Type of Home: House  Lives With: Adult children (dtr Liza; Jacqueline (work) during the day when Liza is at work)  Home Adaptive Equipment: Walker rolling or standard  Home Layout: One level, Full bath main level  Home Access: Stairs to enter without rails  Entrance Stairs-Number of Steps: 1 KORIN from back  Bathroom Shower/Tub: Walk-in shower  Bathroom Toilet: Handicapped height  Bathroom Equipment: Grab bars in shower, Tub transfer bench  Home Living Comments: Pt is poor historian. OT called dtrs Liza and Jacqueline to obtain PLOF and home setup   Prior Function:  Level of [place]:  (IND with dressing, assist with bathing; before fall in december IND functional mobility with no AE; Dtr's try to encourage Pt to use device d/t decreased balance)  Homemaking Assistance:  (dtrs perform household managmeent and IADLs)  IADL History:  Current License: No  Mode of Transportation: Family  ADL:  Eating Deficit:  (total anticipated 2/2 NWB B UE)  Grooming Deficit:  (total anticipated 2/2 B UE NWB)  Bathing Deficit:  (total anticipated 2/2 B UE NWB)  UE Dressing Deficit:  (total  anticipated 2/2 B UE NWB)  LE Dressing Deficit:  (total anticipated 2/2 B UE NWB)  Toileting Deficit:  (total; external catheter; total anticipated 2/2 B UE NWB)  Functional Deficit:  (Pt performed 1 R lateral steps towards HOB max A x2; VC for sequencing)  Activity Tolerance:  Endurance: Tolerates 10 - 20 min exercise with multiple rests       Bed Mobility/Transfers: Bed Mobility  Bed Mobility: Yes  Bed Mobility 1  Bed Mobility 1: Supine to sitting, Sitting to supine  Level of Assistance 1: Moderate assistance  Bed Mobility Comments 1: 2 person assist; HOB elevated   and Transfers  Transfer: Yes  Transfer 1  Transfer From 1: Bed to  Transfer to 1: Stand  Technique 1: Sit to stand  Transfer Level of Assistance 1: Moderate assistance, +2, Minimal verbal cues  Trials/Comments 1: 1x; VC for body mechanics and Pt demonstrated slight posterior lean upon standing  Transfers 2  Transfer From 2: Stand to  Transfer to 2: Bed  Technique 2: Stand to sit  Transfer Level of Assistance 2: Moderate assistance, +2  Trials/Comments 2: 1x  IADL's:   Current License: No  Mode of Transportation: Family  Vision: Vision - Basic Assessment  Current Vision:  (no apparent deficits)     Sensation:  Light Touch: No apparent deficits    Hand Function:  Hand Function  Gross Grasp:  (able to wiggle B fingers; strength not tested 2/2 NWB)  Coordination: Functional  Extremities: RUE   RUE :  (R UE shoulder and elbow flexion AROM WFL; strength not tested 2/2 NWB), LUE   LUE:  (not tested 2/2 sling and NWB; adjusted sling seated EOB), RLE   RLE :  (at least 3/5 strength as observed through functional mobility), and LLE   LLE :  (at least 3/5 strength as observed through functional mobilit)    Outcome Measures: [facility] Daily Activity  Putting on and taking off regular lower body clothing: Total  Bathing (including washing, rinsing, drying): Total  Putting on and taking off regular upper body clothing: Total  Toileting, which includes using toilet,  bedpan or urinal: Total  Taking care of personal grooming such as brushing teeth: Total  Eating Meals: Total  Daily Activity - Total Score: 6         ,     OT Adult Other Outcome Measures  4AT: 4AT+ (dementia at baseline)    Education Documentation  Precautions, taught by Paola Lovelace OT at 2/21/2024 10:56 AM.  Learner: Patient  Readiness: Acceptance  Method: Explanation  Response: Needs Reinforcement    ADL Training, taught by Paola Lovelace OT at 2/21/2024 10:56 AM.  Learner: Patient  Readiness: Acceptance  Method: Explanation  Response: Needs Reinforcement    Goals:     Encounter Problems       Encounter Problems (Active)       BALANCE       Pt will maintain dynamic standing balance during ADL task with minimal assist  level of assistance in order to demonstrate decreased risk of falling and improved postural control. (Progressing)       Start:  02/21/24    Expected End:  03/06/24               COGNITION/SAFETY       Patient will recall and adhere to weight bearing restrictions with all ADL and functional mobility in order to promote healing and safety with functional tasks (Progressing)       Start:  02/21/24    Expected End:  03/06/24            Patient will follow 100% Simple commands to allow improved ADL performance. (Progressing)       Start:  02/21/24    Expected End:  03/06/24               MOBILITY       Patient will perform Functional mobility min Household distances/Community Distances with minimal assist  level of assistance and least restrictive device in order to improve safety and functional mobility. (Progressing)       Start:  02/21/24    Expected End:  03/06/24               TRANSFERS       Patient will perform bed mobility minimal assist  level of assistance in order to improve safety and independence with mobility (Progressing)       Start:  02/21/24    Expected End:  03/06/24            Patient will complete functional transfers with least restrictive device with minimal assist  level  of assistance. (Progressing)       Start:  02/21/24    Expected End:  03/06/24 02/21/24 at 10:58 AM   Paola Lovelace, OT   Rehab Office: [phone]

## 2024-02-21 NOTE — H&P
[facility]  TRAUMA SERVICE - HISTORY AND PHYSICAL / CONSULT    Patient Name: Olesya Rodriguez  MRN: [id number]  Admit Date: 219  : 1940  AGE: 83 y.o.   GENDER: female  ==============================================================================  MECHANISM OF INJURY / CHIEF COMPLAINT:   84yo female unwitnessed fall    LOC (yes/no?): Unknown  Anticoagulant / Anti-platelet Rx? (for what dx?): ASA Plavix Past CVA  Referring Facility Name (N/A for scene EMR run): [facility]     INJURIES:   L Distal Radius fx  L ulnar styloid fx  Acute, comminuted burst type compression fracture of the L3 vertebral body with approximately 40% vertebral body height loss.  Acute on chronic right subdural hematoma measuring up to 9 mm in maximal thickness with minimal adjacent mass effect. No midline shift.    OTHER MEDICAL PROBLEMS:  orthostatic hypotension   CVA unknown if residual deficit  Vascular Dementia  Hypertension    INCIDENTAL FINDINGS:  Cystic Pancreatic lesion     ==============================================================================  ADMISSION PLAN OF CARE:  Assessment: 83 year old female with left wrist fractures, T3 burst fracture, and acute on chronic subdural hematoma.    PLAN:    ##Admit to trauma floor    Acute on Chronic SDH  -Repeat CTH 6 hours post (0600)   -Hold ASA Plavix, Chemical DVT prophylaxis    Burst Fx L3  -Spine Recs pending     - Analgesia: Scheduled Tylenol,  Lidoderm patch, Toradol, Gabapentin, Oxy 5/10 Q4h PRN, Dilaudid BT  - PT/OT when appropriate    ##FEN/GI/  - NPO Diet  - BR with Sophy-Colace and Miralax  - Monitor electrolytes and replete as clinically indicated  - Monitor fluid status and replete as clinically indicated  - Voiding spontaneously   - Strict I/Os  - AM labs    ##PPX  -Hold Chemical ISO SDH   - SCDs    Dispo: BEATRIZ Goodwin MD  Trauma, Critical Care, and Acute Care Surgery  Floor: [id number]   TSICU: [id number]        ==============================================================================  PAST MEDICAL HISTORY:   PMH:   Past Medical History:   Diagnosis Date    Cataract     Cataract     bilateral    CVA (cerebral vascular accident) (CMS/[facility]) 11/15/2017    left thalamic    Syncope 11/15/2011    Vascular dementia of acute onset without behavioral disturbance (CMS/[facility])          PSH:   Past Surgical History:   Procedure Laterality Date    ANKLE SURGERY Left     CATARACT EXTRACTION Right     Dr. Logan    CHOLECYSTECTOMY       FH:   No family history on file.  SOCIAL HISTORY:    Smoking: Former   Social History     Tobacco Use   Smoking Status Former    Years: 8    Types: Cigarettes    Quit date: 1954    Years since quittin.2   Smokeless Tobacco Never       Alcohol: Denied   Social History     Substance and Sexual Activity   Alcohol Use Not on file       Drug use: Denied     MEDICATIONS:   ASA  Plavix  Prior to Admission medications    Not on File     ALLERGIES:   Allergies   Allergen Reactions    Chocolate Rash    Nut - Unspecified Rash    Shellfish Derived Rash       REVIEW OF SYSTEMS:  Review of Systems   Unable to perform ROS: Dementia     PHYSICAL EXAM:  PRIMARY SURVEY:  Airway  Airway is patent.     Breathing  Breathing is normal. Right breath sounds are normal. Left breath sounds are normal.     Circulation  Cardiac rhythm is regular. Rate is regular.   Pulses  Radial: on the right; 2+ on the left.  Pedal: 2+ on the right; 2+ on the left.    Disability  Minal Coma Score  Eye:4   Verbal:5   Motor:6      15  Pupils  Right Pupil:   round and reactive        Left Pupil:   round           Motor Strength   strength:  5/5 on the right  5/5 on the left  Dorsiflex strength:  5/5 on the right  5/5 on the left  Plantarflex strength:  5/5 on the right  5/5 on the left        SECONDARY SURVEY/PHYSICAL EXAM:  Physical Exam  Constitutional:       General: She is not in acute distress.     Appearance: She is not  ill-appearing.   HENT:      Head:      Comments: L frontal old hematoma     Right Ear: External ear normal.      Left Ear: External ear normal.      Nose: Nose normal.      Mouth/Throat:      Mouth: Mucous membranes are moist.      Pharynx: Oropharynx is clear.   Eyes:      General:         Right eye: No discharge.         Left eye: No discharge.      Extraocular Movements: Extraocular movements intact.      Pupils: Pupils are equal, round, and reactive to light.   Cardiovascular:      Rate and Rhythm: Normal rate and regular rhythm.      Pulses: Normal pulses.      Heart sounds: Normal heart sounds.   Pulmonary:      Effort: Pulmonary effort is normal.      Breath sounds: Normal breath sounds.   Abdominal:      General: Abdomen is flat.      Palpations: Abdomen is soft.      Tenderness: There is no abdominal tenderness. There is no guarding or rebound.   Musculoskeletal:      Cervical back: Neck supple. No rigidity.      Comments: No tenderness to palpation L wrist   Skin:     Capillary Refill: Capillary refill takes less than 2 seconds.   Neurological:      General: No focal deficit present.      Mental Status: She is alert. Mental status is at baseline. She is disoriented.      Cranial Nerves: No cranial nerve deficit.      Sensory: No sensory deficit.      Motor: No weakness.   Psychiatric:         Mood and Affect: Mood normal.         Behavior: Behavior normal.       IMAGING SUMMARY:  (summary of findings, not a copy of dictation)  CT Head/Face: cute on chronic right subdural hematoma measuring up to 9 mm in maximal thickness with minimal adjacent mass effect. No midline shift.  CT C-Spine: No acute fracture  CT Chest/Abd/Pelvis: Pancreatic mass  CXR/PXR: No acute process  Other(s): L distal radius fx, L ulnar styloid fx    LABS:  Results from last 7 days   Lab Units 02/20/24  0459   WBC AUTO x10*3/uL 9.0   HEMOGLOBIN g/dL 12.1   HEMATOCRIT % 36.1   PLATELETS AUTO x10*3/uL 410   NEUTROS PCT AUTO % 68.3   LYMPHS  PCT AUTO % 14.5   MONOS PCT AUTO % 8.8   EOS PCT AUTO % 7.4     Results from last 7 days   Lab Units 02/20/24  0459   APTT seconds 25*   INR  1.0     Results from last 7 days   Lab Units 02/20/24  0459   SODIUM mmol/L 137   POTASSIUM mmol/L 4.3   CHLORIDE mmol/L 98   CO2 mmol/L 30   BUN mg/dL 19   CREATININE mg/dL 0.82   CALCIUM mg/dL 9.2   PROTEIN TOTAL g/dL 6.6   BILIRUBIN TOTAL mg/dL 0.4   ALK PHOS U/L 85   ALT U/L 7   AST U/L 15   GLUCOSE mg/dL 80     Results from last 7 days   Lab Units 02/20/24  0459   BILIRUBIN TOTAL mg/dL 0.4           I have reviewed all laboratory and imaging results ordered/pertinent for this encounter.

## 2024-02-21 NOTE — ACP (ADVANCE CARE PLANNING)
Confirming Previous Code Status:   Advance Care Planning Note     Discussion Date: 02/21/24   Discussion Participants: patient and daughter    The patient wishes to discuss Advance Care Planning today and the following is a brief summary of our discussion.     Patient's daughters were at bedside and reported that their mother had told them since they were young children that she would never like to have chest compressions or be intubated. She has always stated to them that she would like to be DNR/DNI. At this time, code status will be changed.    This discussion was completed with attending, Dr. Ward.    Magali Goodwin MD  2/21/2024 1:43 PM

## 2024-02-21 NOTE — CARE PLAN
The patient's goals for the shift include  free from falls     The clinical goals for the shift include patient will remain comfortable throughout shift.      Problem: Pain  Goal: My pain/discomfort is manageable  Outcome: Progressing     Problem: Safety  Goal: Patient will be injury free during hospitalization  Outcome: Progressing     Problem: Safety  Goal: I will remain free of falls  Outcome: Progressing     Problem: Daily Care  Goal: Daily care needs are met  Outcome: Progressing     Problem: Fall/Injury  Goal: Not fall by end of shift  Outcome: Progressing     Problem: Fall/Injury  Goal: Be free from injury by end of the shift  Outcome: Progressing     Problem: Fall/Injury  Goal: Verbalize understanding of personal risk factors for fall in the hospital  Outcome: Progressing

## 2024-02-21 NOTE — HOSPITAL COURSE
Olesya delgado is a 82 Y/O Female w/ hx of CVA on ASA/Plavix, Anxiety, Orthostatic hypotension, and Dementia who presented to [facility] ED after being transferred from OSH s/p unwitnessed fall.  Imaging notable for L distal radius/ulna fx, L3 burst fx, and acute on chronic SDH.  Initial rCTH worsened but then stable on 3rd CTH.  NSGY consulted.  Once imaging stabilized, recommended DVT ppx on PBD #2, holding ASA until PBD #7, and holding plavix until PBD #10.  Ortho consulted for spine fx and recommended upright XR and no bending/twisting.  Ortho hand consulted and recommended non-op mgmt and splint application.  Patient admitted to the [facility] for continued monitoring and PT/OT. Pt resumed on home medications, Geriatric were consulted and have recommended Orthostatic VS, Vit-d/TSH/B12 serum studies. Pt was found to be orthostatic positive 2/21 and home amlodipine was decreased. PT was due to start home ASA and Plavix, however pt is not taking home dapt, was originally started for remote cva. After discussion with attending physician and geriatric service decision was made to discontinue these as the patient is not taking them.    On the morning of discharge, patient and family were agreeable with plan to discharge to SNF. Tolerating a diet and at baseline activity. Incidental findings were discussed. All questions answered.

## 2024-02-21 NOTE — SIGNIFICANT EVENT
Patient evaluated at 19:45. Patient sitting up in bed eating. GCS 14 (E4, V4, M6). DUPREE. Sensation grossly intact throughout. Pending floor bed.     New Sanchez PA-C  Trauma, Critical Care, and Acute Care Surgery  [id number]

## 2024-02-21 NOTE — SIGNIFICANT EVENT
Ortho Spine Update Note    Lumbar uprights reviewed, stable    No acute intervention. Avoid extremes of twisting/bending. No weightbearing restrictions.    Jhony Abraham MD  PGY-2 Orthopedic Surgery  [facility]  [employer] Preferred  Pager: [phone]

## 2024-02-21 NOTE — CARE PLAN
Problem: Pain  Goal: My pain/discomfort is manageable  Outcome: Progressing     Problem: Safety  Goal: Patient will be injury free during hospitalization  Outcome: Progressing  Goal: I will remain free of falls  Outcome: Progressing     Problem: Daily Care  Goal: Daily care needs are met  Outcome: Progressing     Problem: Psychosocial Needs  Goal: Demonstrates ability to cope with hospitalization/illness  Outcome: Progressing  Goal: Collaborate with me, my family, and caregiver to identify my specific goals  Outcome: Progressing     Problem: Discharge Barriers  Goal: My discharge needs are met  Outcome: Progressing     Problem: Fall/Injury  Goal: Not fall by end of shift  Outcome: Progressing  Goal: Be free from injury by end of the shift  Outcome: Progressing  Goal: Verbalize understanding of personal risk factors for fall in the hospital  Outcome: Progressing  Goal: Verbalize understanding of risk factor reduction measures to prevent injury from fall in the home  Outcome: Progressing  Goal: Use assistive devices by end of the shift  Outcome: Progressing  Goal: Pace activities to prevent fatigue by end of the shift  Outcome: Progressing   The patient's goals for the shift include      The clinical goals for the shift include patient will remain comfortable throughout shift.

## 2024-02-21 NOTE — PROGRESS NOTES
Physical Therapy    Physical Therapy Evaluation    Patient Name: Olesya Rodriguez  MRN: [id number]  Today's Date: 2/21/2024   Time Calculation  Start Time: 0832  Stop Time: 0844  Time Calculation (min): 12 min    Assessment/Plan   PT Assessment  PT Assessment Results: Decreased strength, Decreased range of motion, Decreased endurance, Impaired balance, Decreased mobility, Decreased coordination, Decreased cognition, Impaired judgement, Decreased safety awareness  Rehab Prognosis: Good  Medical Staff Made Aware: Yes  End of Session Communication: Bedside nurse  Assessment Comment: Pt is an 84yo F presenting from a fall at home with new R radius fx and acute on chronic SDH. Patient with previous L radius fx as well and now NWB BUEs. Patient is pleasantly confused, AO to self. Per EMR, lives with her 2 daughters at home but has had falls. Pt is a high falls risk. dc rec mod  End of Session Patient Position: Bed, 3 rail up, Alarm on  IP OR SWING BED PT PLAN  Inpatient or Swing Bed: Inpatient  PT Plan  Treatment/Interventions: Bed mobility, Transfer training, Gait training, Stair training, Balance training, Neuromuscular re-education, Strengthening, Range of motion, Endurance training, Therapeutic exercise, Therapeutic activity  PT Plan: Skilled PT  PT Frequency: 3 times per week  PT Discharge Recommendations: Moderate intensity level of continued care  PT Recommended Transfer Status: Assist x2  PT - OK to Discharge: Yes (indicates PT eval complete and dc rec determined)      Subjective   General Visit Information:  General  Reason for Referral: fall with L DR fx and acute on chronic R subdural hematoma.  Past Medical History Relevant to Rehab: dementia, CVA (Asa/plavix), orthostatic hypotension, R DRFx 1/2024  Co-Treatment: OT  Co-Treatment Reason: maximize pt safety and function in pt with multiple WB statuses  Prior to Session Communication: Bedside nurse  Patient Position Received: Bed, 3 rail up, Alarm on  General  "Comment: pt supine in bed, pleasantly confused during session and cooperative. RN cleared  Home Living:  Home Living  Home Living Comments: Patient is a poor historian and unable to provide home set. Per EMR, pt lives with 2 daughters at home and has a FWW. Patient reports living with her mother. When asked if she lives with her 2 sisters she said \"do i?\"  Prior Level of Function:  Prior Function Per Pt/Caregiver Report  Prior Function Comments: Patient unable to provide PLOF. Patient unable to recall using a FWW, reports \"that oculd be helpful\". Per EMR, pts dtrs reports increased falls at home.  Precautions:  Precautions  UE Weight Bearing Status: Right Non-Weight Bearing, Left Non-Weight Bearing  Precautions Comment: pt in sling for LUE on arrival- adjusted appropriately.      Objective   Pain:  Pain Assessment  Pain Assessment: 0-10  Pain Score: 0 - No pain  Cognition:  Cognition  Orientation Level:  (AO to self, disoriented to place, time and situation)    General Assessments:       Activity Tolerance  Endurance: Tolerates 10 - 20 min exercise with multiple rests    Sensation  Light Touch: No apparent deficits    Static Sitting Balance  Static Sitting-Level of Assistance:  (min A, progress to CGA)     Functional Assessments:  Bed Mobility  Bed Mobility: Yes  Bed Mobility 1  Bed Mobility 1: Supine to sitting  Level of Assistance 1: Moderate assistance  Bed Mobility Comments 1: HOB elevated slightly, assist with trunk    Transfers  Transfer: Yes  Transfer 1  Transfer From 1: Sit to, Stand to  Transfer to 1: Stand, Sit  Technique 1: Sit to stand, Stand to sit  Transfer Level of Assistance 1: Moderate assistance (x2)  Trials/Comments 1: pt stood from EOB, mod A x2 with bilateral arm in arm support. pt with slight posterior lean in stand.    Ambulation/Gait Training  Ambulation/Gait Training Performed:  (one lateral step with mod A x2 but increased unsteadiness noted and posterior lean during attempt, wide " VICTORINA)  Extremity/Trunk Assessments:  RLE   RLE :  (> 3+/5)  LLE   LLE :  (3+/5)  Outcome Measures:  [facility] Basic Mobility  Turning from your back to your side while in a flat bed without using bedrails: A lot  Moving from lying on your back to sitting on the side of a flat bed without using bedrails: A lot  Moving to and from bed to chair (including a wheelchair): A lot  Standing up from a chair using your arms (e.g. wheelchair or bedside chair): A lot  To walk in hospital room: Total  Climbing 3-5 steps with railing: Total  Basic Mobility - Total Score: 10    Encounter Problems       Encounter Problems (Active)       Balance       STG - Maintains static standing balance without upper extremity support for 10 min modif indep  (Progressing)       Start:  02/21/24    Expected End:  03/14/24       INTERVENTIONS:  1. Practice standing with minimal support.  2. Educate patient about maintaining total hip precautions while maintaining balance.  3. Educate patient about standing tolerance.  4. Educate patient about independence with gait, transfers, and ADL's.  5. Educate patient about use of assistive device.  6. Educate patient about self-directed care.            Mobility       STG - Patient will ambulate >20' and progress with SBA, LRAD to maintain NWB BUEs (Progressing)       Start:  02/21/24    Expected End:  03/14/24               Transfers       STG - Patient will perform bed mobility modif indep  (Progressing)       Start:  02/21/24    Expected End:  03/14/24            STG - Patient will transfer sit to and from stand SBA  (Progressing)       Start:  02/21/24    Expected End:  03/14/24                   Education Documentation  Precautions, taught by Elena Cuadra PT at 2/21/2024  9:59 AM.  Learner: Patient  Readiness: Acceptance  Method: Explanation  Response: Needs Reinforcement  Comment: edu on role of PT and precautions    Mobility Training, taught by Elena Cuadra PT at 2/21/2024  9:59 AM.  Learner:  Patient  Readiness: Acceptance  Method: Explanation  Response: Needs Reinforcement  Comment: edu on role of PT and precautions    Education Comments  No comments found.

## 2024-02-21 NOTE — PROGRESS NOTES
"Subjective   Follow-up for dementia with deconditioning. No events overnight. Patient reports she is feeling \"beautiful\" this morning and that she slept well. She denies pain currently. She denies any concerns today.     She remains disoriented (oriented to self only) but is pleasant and in good spirits.        Objective     Current Facility-Administered Medications   Medication Dose Route Frequency Provider Last Rate Last Admin    acetaminophen (Tylenol) tablet 975 mg  975 mg oral q6h [facility] Magali Goodwin MD   975 mg at 02/21/24 0605    amLODIPine (Norvasc) tablet 5 mg  5 mg oral Daily Magali Goodwin MD   5 mg at 02/21/24 0823    levETIRAcetam in NaCl (iso-os) (Keppra)  mg  500 mg intravenous q12h Magali Goodwin MD   Stopped at 02/20/24 1858    oxygen (O2) therapy   inhalation Continuous PRN - O2/gases Magali Goodwin MD        sertraline (Zoloft) tablet 50 mg  50 mg oral Daily Magali Goodwin MD   50 mg at 02/21/24 0823       Physical Exam  Constitutional:       General: She is not in acute distress.  Cardiovascular:      Rate and Rhythm: Normal rate and regular rhythm.   Pulmonary:      Effort: Pulmonary effort is normal.      Breath sounds: Normal breath sounds.   Abdominal:      Palpations: Abdomen is soft.   Skin:     General: Skin is warm and dry.   Neurological:      Mental Status: She is alert. She is disoriented.      Comments: Oriented to self only         Confusion Assessment Method(CAM) for diagnosis of delirium:    1.  Acute onset or fluctuating course: absent/present: Absent  2.  Inattention: absent/present: Present  3.  Disorganized thinking: absent/present: Present  4.  Altered level of consciousness: absent/present: Absent  CAM: negative    AT Score For Assessment of Delirium and Cognitive Impairment:    Alertness: 0  Normal(fully alert,but not agitated, throughout assessment)=0  Mild sleepiness for <10 seconds after walking, then normal=0  Clearly abnormal=4  2.  AMT4: 2  No " "mistakes=0  One mistake=1  Two or more mistakes/untestable=2  3.  Attention: 1  Achieves seven months or more correctly=0  Starts but scores <7 months/ refuses to start=1  Untestable(cannot start because unwell, drowsy, inattentive)=2  4.  Acute: 0  No=0  Yes=4    Total Score: 3  4 or above: Possible delirium +/- cognitive impairment  1-3: Possible cognitive impairment  0: Delirium or severe cognitive impairment unlikely(but delirium still possible if (4) information incomplete)      Last Recorded Vitals      2/20/2024     6:45 PM 2/20/2024     7:17 PM 2/20/2024     7:54 PM 2/20/2024     8:58 PM 2/20/2024    11:18 PM 2/21/2024     5:11 AM 2/21/2024     6:47 AM   Vitals   Systolic 163 143 133 151 114 174 139   Diastolic 80 71 61 73 61 75 69   Heart Rate 82 84 80 76 80 78 68   Temp    36.4 °C (97.5 °F) 36 °C (96.8 °F) 36.7 °C (98.1 °F)    Resp 16 16 19 18 15 17       There were no vitals filed for this visit.     Relevant Results  No results found for: \"TSH\", \"HOVHZTBP25\", \"VITD25\", \"HGBA1C\"  Results from last 7 days   Lab Units 02/21/24  0643 02/20/24  0459   WBC AUTO x10*3/uL 8.2 9.0   HEMOGLOBIN g/dL 14.1 12.1   HEMATOCRIT % 42.8 36.1   ALT U/L  --  7   AST U/L  --  15   SODIUM mmol/L 136 137   POTASSIUM mmol/L 4.8 4.3   CHLORIDE mmol/L 97* 98   CREATININE mg/dL 0.82 0.82   BUN mg/dL 17 19   CO2 mmol/L 28 30   INR   --  1.0          XR lumbar spine 2-3 views  Narrative: Interpreted By:  Karson Perez,   STUDY:  XR LUMBAR SPINE 2-3 VIEWS; ;  2/20/2024 12:31 pm      INDICATION:  Signs/Symptoms:ortho request: lumbar burst fracture on CT.      COMPARISON:  None.      ACCESSION NUMBER(S):  [id number]      ORDERING CLINICIAN:  YUNIER MANGUS      FINDINGS:  Lumbar Spine, two views      There is a burst fracture of the L3 vertebral body with sclerosis and  mild loss of height. There is no evidence of retropulsion of osseous  fragments in the spinal canal radiographically. There is no  malalignment.      Mild " multilevel spondylitic changes present.      Impression: Burst fracture of L3 vertebral body without significant retropulsion  of osseous fragments radiographically          MACRO:  None      Signed by: Karson Perez 2/20/2024 1:04 PM  Dictation workstation:   [id number]  CT head wo IV contrast  Narrative: Interpreted By:  Kathleen Urrutia,   STUDY:  CT HEAD WO IV CONTRAST;  2/20/2024 11:52 am      INDICATION:  Signs/Symptoms:Stability scan.      COMPARISON:  02/20/2024 at 6:46 a.m.      ACCESSION NUMBER(S):  [id number]      ORDERING CLINICIAN:  JOSE LUIS LOPEZ      TECHNIQUE:  Noncontrast axial CT scan of head was performed. Angled reformats in  brain and bone windows were generated. The images were reviewed in  bone, brain, blood and soft tissue windows. Coronal and sagittal  reformats are provided for review.      FINDINGS:  There is again a mixed attenuation extra-axial collection overlying  the right cerebral hemisphere currently measuring up to approximately  10 mm in greatest thickness, similar from the previous examination.  There is persistent mass effect with asymmetric sulcal effacement on  the right and very minimal effacement of the right lateral ventricle.  There is again at most trace bowing of the septum pellucidum to the  left.      There are again extensive areas of abnormal diminished attenuation  within the subcortical and periventricular white matter. There is  subtle hypodensity with loss of gray/white matter differentiation  involving portions of the right temporal and parietal lobes. It is  uncertain to what extent this is due to artifact. There are  hypodensities in the basal ganglia, thalami, and external capsules  which may represent lacunar infarcts or perivascular spaces. The  posterior fossa is degraded by beam hardening artifact. There are  hypodensities in the abel.      There is prominence of ventricles and sulci compatible with diffuse  parenchymal volume loss.      There are  again small retention cysts or polyps in the left maxillary  sinus. The mastoid air cells are clear.      Impression: Redemonstration of subdural hematoma overlying the right cerebral  hemisphere with associated mass effect overall very similar from the  examination performed earlier on the same date.      MACRO:  None      Signed by: Jessie Urrutia 2/20/2024 11:57 AM  Dictation workstation:   [id number]  CT head wo IV contrast  Narrative: Interpreted By:  Jessie Urrutia,  Alaina Linn   STUDY:  CT HEAD WO IV CONTRAST;  2/20/2024 6:58 am      INDICATION:  Signs/Symptoms:SDH, want to monitor for stability.      COMPARISON:  CT head without contrast 02/19/2024      ACCESSION NUMBER(S):  [id number]      ORDERING CLINICIAN:  JESSIE WADDELL      TECHNIQUE:  Noncontrast axial CT scan of head was performed. Angled reformats in  brain and bone windows were generated. The images were reviewed in  bone, brain, blood and soft tissue windows.      FINDINGS:  The examination is motion degraded.      CSF Spaces: There is prominence of ventricles and sulci compatible  with diffuse parenchymal volume loss. There is interval increase in a  now 1.0 cm mixed density extra-axial fluid collection overlying the  right frontal and parietal lobe (series 205, image 43), previously  measuring up to 0.9 cm. There is interval increase in mixed high  density focus within the fluid collection when compared to prior  exam, suggestive of increased acute blood products. Persistent mild  mass effect on the adjacent parenchyma. There is trace bowing of the  septum pellucidum to the left. Hyperdensity along the falx and  tentorium is favored to be related to the dural sinuses.      Parenchyma: There are again nonspecific areas of diminished  attenuation in the subcortical and periventricular white matter. The  grey-white differentiation is intact. There is no mass effect or  midline shift.  There is no intracranial hemorrhage.  Atherosclerotic  calcifications of the carotid siphons.      Calvarium: The calvarium is unremarkable.      Paranasal sinuses and mastoids: Mucous retention cysts versus polyps  within the inferior left frontal sinus and left maxillary sinus.  Mastoid air cells are clear.      Impression: 1. Interval worsening of right hemispheric subdural hematoma  measuring up to 1.0 cm in maximal thickness, previously measuring up  to 0.9 cm there is persistent associated local mass effect with at  most minimal bowing of the septum pellucidum to the left.  2. Nonspecific periventricular and subcortical white matter changes  most likely representing small-vessel ischemic disease in a patient  of this age with generalized parenchymal volume loss, similar when  compared to prior exam.      I personally reviewed the images/study, and I agree with the findings  as stated above.      MACRO:  Temitope Oates discussed the significance and urgency of this critical  finding by telephone with JESSIE WADDELL on 2/20/2024 at 7:11 am.  (**-RCF-**) Findings:  See findings.          Signed by: Jessie Urrutia 2/20/2024 7:45 AM  Dictation workstation:   [id number]  XR wrist right 3+ views  Narrative: Interpreted By:  Karson Perez and Meyers Emily   STUDY:  XR WRIST RIGHT 3+ VIEWS; ;  2/20/2024 5:54 am      INDICATION:  Signs/Symptoms:Hx fracture.      COMPARISON:  Study performed earlier the same day.      ACCESSION NUMBER(S):  [id number]      ORDERING CLINICIAN:  JOSE LUIS LOPEZ      FINDINGS:  Three views of the right wrist were obtained.      Please note, overlying casting material partially limits fine osseous  and soft tissue detail.      Impacted fracture of the distal radius with a proximally 0.6 cm of  fracture fragment overriding and volar displacement of a large  fracture fragment. There is intra-articular extension. Further, there  is an acute, mildly distracted ulnar styloid fracture.      There is increased scapholunate interval  measuring up to 0.3 cm.      There is resultant minimal positive ulnar variance.      Impression: 1. Redemonstration of acute, comminuted and impacted fracture of the  distal radius with intra-articular extension.  2. Acute, mildly distracted ulnar styloid fracture.  3. Widening of the scapholunate interval, which may suggests  scapholunate ligamentous injury.          I personally reviewed the images/study, and I agree with the findings  as stated above. This study was interpreted at [place], [place].      MACRO:  None      Signed by: Karson Perez 2/20/2024 6:59 AM  Dictation workstation:   [id number]  ECG 12 lead  See ED provider note for full interpretation and clinical correlation  Confirmed by Lance Stover ([id number]) on 2/20/2024 4:09:09 AM  CT thoracic spine wo IV contrast, CT lumbar spine wo IV contrast, CT chest abdomen pelvis w IV contrast  Narrative: Interpreted By:  Sania Gandhi and Meyers Emily   STUDY:  CT CHEST ABDOMEN PELVIS W IV CONTRAST; CT THORACIC SPINE WO IV  CONTRAST; CT LUMBAR SPINE WO IV CONTRAST;  2/20/2024 1:19 am      INDICATION:  Signs/Symptoms:fall.      COMPARISON:  None.      ACCESSION NUMBER(S):  [id number]; [id number]; [id number]      ORDERING CLINICIAN:  JESSIE WADDELL      TECHNIQUE:  Contiguous axial images of the chest, abdomen, and pelvis were  obtained after the intravenous administration of 100 mL Omnipaque 350  contrast. Coronal and sagittal reformatted images were reconstructed  from the axial data.      Multiplanar reformatted images of the thoracic and lumbar spine were  reconstructed from source data of concurrent CT chest/abdomen/pelvis  acquisition.      FINDINGS:  CT CHEST:      MEDIASTINUM AND LYMPH NODES: The esophagus appears within normal  limits. No enlarged intrathoracic or axillary lymph nodes by imaging  criteria. No pneumomediastinum.      VESSELS: Normal caliber thoracic aorta. No evidence of  traumatic  aortic injury. No significant aortic atherosclerosis.      HEART: Normal size. Moderate coronary artery calcifications. No  significant pericardial effusion.      LUNG, AIRWAYS, PLEURA: Dependent bibasilar atelectasis. Mild  interlobular septal thickening. There is slight mosaic attenuation of  the upper lobes predominantly. No focal consolidation, pleural  effusion or sizable pneumothorax seen.      CHEST WALL SOFT TISSUES: No discernible acute abnormality.      OSSEOUS STRUCTURES: No acute osseous abnormality.          CT ABDOMEN/PELVIS:      ABDOMINAL WALL: Small fat containing umbilical hernia.      LIVER: 19.7 cm, enlarged. No significant parenchymal abnormality.      BILE DUCTS: Mild biliary ductal prominence, indeterminate in the post  cholecystectomy state.      GALLBLADDER: Postsurgical change of cholecystectomy.      SPLEEN: No significant abnormality.      PANCREAS: There is a 3.0 x 1.2 cm cystic lesion within the junction  between the pancreatic head and body (series 301, image 53).      ADRENALS: No significant abnormality.      KIDNEYS, URETERS, BLADDER: No significant abnormality.      REPRODUCTIVE ORGANS: No significant abnormality.      VESSELS: Moderate atherosclerotic calcifications of the abdominal  aorta and its branches. There are mild-to-moderate atherosclerotic  calcifications of the origin of the celiac axis and superior  mesenteric artery without occlusion. No abdominal aortic aneurysm.      LYMPH NODES/RETROPERITONEUM: No acute retroperitoneal abnormality. No  enlarged lymph nodes.      BOWEL/MESENTERY/PERITONEUM: Stomach is unremarkable in appearance. No  bowel wall thickening or dilatation. Moderate colonic stool burden  with rectal distention measuring up to 7.5 cm without evidence of  stercoral colitis. Normal appendix. No ascites, free air or fluid  collection.      Partial ankylosis of the sacroiliac joints.      CT THORACIC SPINE:      There are 12 rib-bearing vertebral  bodies.      PARASPINAL SOFT TISSUES: No paravertebral fluid collection or  significant edema. ALIGNMENT: Mild rightward curvature which may be  partially positional, otherwise alignment is well-maintained. No  traumatic spondylolisthesis or traumatic facet widening. VERTEBRAE:  Veins appear partially demineralized. No acute fracture. Vertebral  body heights are maintained. SPINAL CANAL/INTERVERTEBRAL DISCS: No  high-grade spinal canal stenosis. Varying degrees of degenerative  disc disease, with multilevel intestinal fights suggestive of  ankylosing spondylitis.          CT LUMBAR SPINE:      There are 5 lumbarized vertebral bodies with the last intervertebral  body disc space labeled L5-S1.      PARASPINAL SOFT TISSUES: No paravertebral fluid collection or  significant edema. ALIGNMENT: Alignment is well-maintained. No  traumatic spondylolisthesis or traumatic facet widening. VERTEBRAE:  Acute, severely comminuted compression fracture of the L3 vertebral  body with approximately 40% vertebral body height loss. There is  anterior displacement of few osseous fragments without significant  mass effect. There is no significant retropulsion of osseous  fragments into the spinal canal. SPINAL CANAL/INTERVERTEBRAL DISCS:  No high-grade spinal canal stenosis. No significant intervertebral  body disc height loss. NEURAL FORAMINA: Circumferential disc bulge  and multilevel facet arthropathy with mild left neural foraminal  stenosis at L4-L5 and L5-S1.      Impression: CT CHEST/ABDOMEN/PELVIS:  1. No acute posttraumatic finding within the chest, abdomen, or  pelvis.  2. Cystic pancreatic lesion measuring up to 3.0 cm within the  junction of the pancreatic head and body without distal pancreatic  ductal dilatation, which may represent a IPMN. Nonemergent MRI may be  considered for further evaluation.  3. Slight mosaic attenuation of the predominantly upper lobes of the  lungs bilaterally, which may represent a component of  air trapping.  4. Moderate stool burden. Correlation for constipation is suggested.          CT THORACIC AND LUMBAR SPINE:  1. Acute, comminuted burst type compression fracture of the L3  vertebral body with approximately 40% vertebral body height loss. No  significant retropulsion of osseous fragments into the spinal canal.  2. Multilevel circumferential disc bulge and facet arthropathy with  resultant mild left neural foraminal stenosis at L4-L5 and L5-S1.  3. Findings suggestive of ankylosing spondylitis.      I personally reviewed the images/study, and I agree with the findings  as stated above. This study was interpreted at [place], [place].      MACRO:  None.      Signed by: Sania Gandhi 2/20/2024 3:02 AM  Dictation workstation:   [id number]  CT cervical spine wo IV contrast  Narrative: Interpreted By:  Sania Gandhi and Meyers Emily   STUDY:  CT CERVICAL SPINE WO IV CONTRAST;  2/20/2024 1:19 am      INDICATION:  Signs/Symptoms:fall.      COMPARISON:  CT Head without contrast 02/19/2024      ACCESSION NUMBER(S):  [id number]      ORDERING CLINICIAN:  JESSIE WADDELL      TECHNIQUE:  Axial CT images of the cervical spine are obtained. Axial, coronal  and sagittal reconstructions are provided for review.      FINDINGS:      Fractures: There is no evidence for an acute fracture of the cervical  spine. Vertebral body heights are maintained.      Vertebral Alignment: Minimal retrolisthesis of C2 on C3, C3 on C4 and  C4 on C5. Facet joint an craniocervical alignment maintained.      Craniocervical Junction: The odontoid process and craniocervical  junction are intact.      Vertebrae/Disc Spaces:  The cervical vertebral body heights are  intact. There is multilevel loss of intervertebral body disc height,  most notably at C5-C6, C6-C7, and C7-T1 with associated vacuum disc  phenomenon. Further, there is osteophytic spurring and adjacent  subchondral cystic formation  and Schmorl's nodes. Disc osteophyte  complex at C5-C6 with resultant mild spinal canal stenosis. Further,  there is multilevel facet arthrosis with resultant multilevel neural  foraminal stenosis, most notably for moderate bilateral neural  foraminal stenosis at C5-C6 and C6-C7.      Prevertebral/Paraspinal Soft Tissues: The prevertebral and paraspinal  soft tissues are unremarkable.      Partially visualized lung apices are clear. Trace secretions are  noted within the proximal esophageal lumen.      Impression: 1. No evidence for an acute fracture or subluxation of the cervical  spine.  2. Spondylotic change of the cervical spine with mild central canal  stenosis at C5-C6 and moderate bilateral neural foraminal stenosis at  C5-C6 and C6-C7.      I personally reviewed the images/study, and I agree with the findings  as stated above. This study was interpreted at [place], [place].      MACRO:  None      Signed by: Sania Gandhi 2/20/2024 2:48 AM  Dictation workstation:   [id number]  XR wrist left 3+ views, XR forearm left 2 views  Narrative: Interpreted By:  Sania Gandhi and Meyers Emily   STUDY:  XR WRIST LEFT 3+ VIEWS; XR FOREARM LEFT 2 VIEWS; ;  2/20/2024 1:22 am      INDICATION:  Signs/Symptoms:Left wrist fracture; Signs/Symptoms:fall, LUE injury.      COMPARISON:  None.      ACCESSION NUMBER(S):  [id number]; [id number]      ORDERING CLINICIAN:  JOSE LUIS WADDELL      FINDINGS:  Three views of the left wrist and two views of the left forearm were  obtained.      Please note, overlying casting material partially limits fine osseous  and soft tissue detail.      Impacted fracture of the distal radius with approximately 0.6 cm of  fracture fragment overriding and volar displacement of a large  fracture fragment. Intra-articular extension suspected. There is an  acute, mildly distracted ulnar styloid fracture.      There is resultant minimal  positive ulnar variance.      No additional fractures of the proximal radius or ulna within  constraints of artifact.      Joints are maintained.      Impression: 1. Acute, comminuted and impacted fracture of the distal radius with  probable intra-articular extension  2. Acute, mildly distracted ulnar styloid fracture.      I personally reviewed the images/study, and I agree with the findings  as stated above. This study was interpreted at [facility], [place], [place].      MACRO:  None      Signed by: Sania Gandhi 2/20/2024 2:44 AM  Dictation workstation:   [id number]  CT head wo IV contrast  Narrative: Interpreted By:  Sania Gandhi,  Alaina Linn   STUDY:  CT HEAD WO IV CONTRAST;  2/19/2024 11:58 pm      INDICATION:  Signs/Symptoms:Fall head injury, hx previous subdural.      COMPARISON:  None.      ACCESSION NUMBER(S):  [id number]      ORDERING CLINICIAN:  JOSE LUIS LOPEZ      TECHNIQUE:  Noncontrast axial CT scan of head was performed. Angled reformats in  brain and bone windows were generated. The images were reviewed in  bone, brain, blood and soft tissue windows.      FINDINGS:  CSF Spaces: The ventricles, sulci and basal cisterns enlarged,  concordant with parenchymal volume loss. There is a 9 mm mixed  density extra-axial fluid collection overlying the right frontal and  parietal lobe. There are numerous high density foci noted throughout  the collection (series 204, image 51, 64, and 66; series 205, image  50). There is mild mass effect on the adjacent parenchyma.      Parenchyma: Moderate generalized parenchymal volume loss.  Periventricular and subcortical white matter hypodensities, which are  likely sequelae of chronic small-vessel ischemic change. The  grey-white differentiation is intact. There is no mass effect or  midline shift.  There is no intracranial hemorrhage. Atherosclerotic  calcifications of the carotid arteries.      Calvarium: The calvarium  is unremarkable.      Paranasal sinuses and mastoids: Mucous retention cysts versus polyps  within inferior left frontal sinus and left maxillary sinus. Mastoid  air cells are clear.      Postsurgical changes of the lenses.      Impression: 1. Acute on chronic right subdural hematoma measuring up to 9 mm in  maximal thickness with minimal adjacent mass effect. No midline shift.  2. Non-specific mild white matter changes and generalized parenchymal  volume loss.          I personally reviewed the images/study, and I agree with the findings  as stated above. This study was interpreted at [facility], [place], [place].      MACRO:  Temitope Oates discussed the significance and urgency of this critical  finding by telephone with Dr. Oskar Rod MD on 2/20/2024 at 12:17  am.  (**-RCF-**) Findings:  See findings.          Signed by: Sania Gandhi 2/20/2024 12:49 AM  Dictation workstation:   [id number]        DATA:  EKG: QTC  Encounter Date: 02/19/24   ECG 12 lead   Result Value    Ventricular Rate 70    Atrial Rate 70    DE Interval 148    QRS Duration 82    QT Interval 422    QTC Calculation(Bazett) 455    P Axis 83    R Axis 53    T Axis 45    QRS Count 12    Q Onset 223    P Onset 149    P Offset 185    T Offset 434    QTC Fredericia 444    Narrative    See ED provider note for full interpretation and clinical correlation  Confirmed by Lance Stover ([id number]) on 2/20/2024 4:09:09 AM      Anti-psychotics in 48 hours: None  Opioids/Benzodiazepines in 48 hours: None  Anticholinergics on board:No  Restraints:No  Indwelling catheters:No  Last BM: Not recorded  UO in 24 hours: Not recorded  Activity in the past 24 hours: None  Need for ambulatory devices: uses walker at home, though not regularly            Assessment/Plan   This is a/an 83 y.o. year old female, with past medical history relevant for dementia, CVA, orthostatic hypotension, presenting for unwitnessed fall (R DRFx 1/2024),  also w L DR fx and acute on chronic R subdural hematoma being seen in geriatric consultation for dementia with severe deconditioning. It is unclear if there is a component of agitation as patient appeared to be slowly getting hyperactive on exam.      Active Problems:  There are no active Hospital Problems.     # Acute fall unclear etiology. H/o orthostatic hypotension   Plan:               - wait for PT/OT eval, patient could benefit from rehab.               - Check orthostatic vitals.               - Depending on orthostatic vitals check, will evaluate need to decrease amlodipine to 2.5mg or not.               - Recommend ordering Vitamin D level              - Consider telemetry monitoring              - Agree with PT/OT evaluations     # Acute L - comminuted fracture of distal radius, s/p acute fall  Patient's pain appears to be well controlled  Plan:               - Per orthopedic team, no acute intervention indicated.   - No heavy lifting/twisting/bending >10lbs  - uprights when able              - Continue Tylenol 975mg TID for pain control              - Consider oxycodone 2.5mg q6 PRN.      # L-sided subdural hematoma, s/p acute fall  Plan:               - Per neurosurgery, repeat head CT stable.                -Holding Plavix until post bleed day 10 and ASA until post bleed day 7.                - Continue with Keppra IV per neurosurgery     # Dementia, mild to moderate, suspected vascular at baseline with deconditioning. No MoCa found on file.  Plan:   - Recommend ordering baseline TSH, Vitamin B12.   - Discuss placement and code status with POA.     # Patient at risk for delirium   Please consider the following general measures for minimizing delirium in a hospitalized patient:   -Bright lights during the day, keeps blinds up, switch all lights on   -avoid disturbances at night. Encourage at least 6 hours uninterrupted sleep. Consider d/c 4am vitals check  -avoid benzodiazepines, sedatives. Minimize  opioids   -avoid anti-cholinergics    -avoid restraints.   -Seroquel 12.5mg BID or low dose haldol 0.5mg PO (IM if PO not possible) only PRN for severe agitation where pt exhibits volatile behavior and is a threat to self or others. EKGs to monitor QTc   -daily orientation to time and place by the staff   -out of bed to chair few hours everyday  - encourage stimulating activities during the day if possible    #Misc  - Recommend encouraging incentive spirometry with nursing cues/instructions     #Medical Decision making capacity   - Patient lacks capacity to make decisions. Does not understand why she is in the hospital. Lacks capacity to appreciate her condition and was not able to provide logical reasoning for why she fell.      Medication Evaluation:   High risk medications: none  Other concerning issues regarding medications: none            4M AGE-FRIENDLY INITIATIVE:  What matters most to patient: being with her family  Medications: No high risk medications  Mentation: mild-moderate dementia, vascular, dependent on daughter for all IADLs and some BADLs   Mobility: has a walker for ambulation but usually does not use.     Geriatric medicine will continue to follow the patient. Thank you for allowing geriatric medicine to be involved in the care of your patient. Geriatric medicine consultation team is available during work hours Monday through Friday. For any emergency issues requiring immediate assistance over the weekend, please page Geriatrics pager [phone]    Helen Mcmahon, MS-3    I saw and evaluated the patient.  I personally obtained the key and critical portions of the history and physical exam or was physically present for key and critical portions performed by the resident. I reviewed the resident’s documentation and discussed the patient with the resident.  I agree with the resident’s medical decision making as documented in their note with the exception/addition of the following:     See additional  comments in italics    Daisy Guillory MD

## 2024-02-21 NOTE — PROGRESS NOTES
[facility]  TRAUMA SERVICE - PROGRESS NOTE    Patient Name: Olesya Rodriguez  MRN: [id number]  Admit Date: 219  : 1940  AGE: 83 y.o.   GENDER: female  ==============================================================================  MECHANISM OF INJURY / CHIEF COMPLAINT:   82yo female unwitnessed fall    LOC (yes/no?): Unknown  Anticoagulant / Anti-platelet Rx? (for what dx?): ASA Plavix Past CVA  Referring Facility Name (N/A for scene EMR run): [facility]      INJURIES:   L Distal Radius fx  L ulnar styloid fx  Acute, comminuted burst type compression fracture of the L3 vertebral body with approximately 40% vertebral body height loss.  Acute on chronic right subdural hematoma measuring up to 9 mm in maximal thickness with minimal adjacent mass effect. No midline shift.     OTHER MEDICAL PROBLEMS:  orthostatic hypotension   CVA unknown if residual deficit  Vascular Dementia  Hypertension     INCIDENTAL FINDINGS:  Cystic Pancreatic lesion     ==============================================================================  TODAY'S ASSESSMENT AND PLAN OF CARE:  Assessment: 83 year old female with left wrist fractures, T3 burst fracture, and acute on chronic subdural hematoma.     PLAN:  Acute on Chronic SDH  -Repeat CTH showed increase in SDH from 9mm to 10mm. Next repeat CT showed stability.   -Neurosurgery consult  -Okay for DVT ppx post bleed day 2  -Okay for ASA post bleed day 7 and PLX post bleed day 10     Burst Fx L3  -ortho surg consulted  -upright images reviewed by ortho  -no acute intervention  -avoid extreme twisting/bending  -no weightbearing instructions    L Distal Radius fx  L ulnar styloid fx  -splinted by orthopedic surgery  -no operative intervention  -follow up outpt      - Analgesia: Scheduled Tylenol,  Lidoderm patch, Toradol, Gabapentin, Oxy 5/10 Q4h PRN, Dilaudid BT  - PT/OT rec'ed moderate intensity (SNF)     ##FEN/GI/  - regular diet  - BR with Sophy-Colace  and Miralax  - Monitor electrolytes and replete as clinically indicated  - Monitor fluid status and replete as clinically indicated  - Voiding spontaneously   - Strict I/Os  - AM labs prn     ##PPX  -starting subcutaneous lovenox today (post bleed day 2)  - SCDs     Dispo: RNF, pending SNF choices    Patient seen and plan discussed with attending, Dr. Ward.     Magali Goodwin MD- PGY1  Trauma Surgery  Floor: [phone]    ==============================================================================  CHIEF COMPLAINT / OVERNIGHT EVENTS:   No acute overnight events. Pain is well controlled. Patient has been self voiding. Has been tolerating diet. Ambulating with physical therapy. Daughters at bedside wish to discuss code status. Please see ACP note.     MEDICAL HISTORY / ROS:  Admission history and ROS reviewed. Pertinent changes as follows:  none    PHYSICAL EXAM:  Heart Rate:  [68-84]   Temp:  [36 °C (96.8 °F)-36.7 °C (98.1 °F)]   Resp:  [15-19]   BP: (114-174)/(61-87)   SpO2:  [94 %-99 %]     Constitutional:       General: She is not in acute distress.     Appearance: She is not ill-appearing.   HENT:      Head:      Comments: L frontal old hematoma     Right Ear: External ear normal.      Left Ear: External ear normal.      Nose: Nose normal.      Mouth/Throat:      Mouth: Mucous membranes are moist.      Pharynx: Oropharynx is clear.   Eyes:      General:         Right eye: No discharge.         Left eye: No discharge.      Extraocular Movements: Extraocular movements intact.      Pupils: Pupils are equal, round, and reactive to light.   Cardiovascular:      Rate and Rhythm: Normal rate and regular rhythm.      Pulses: Normal pulses.      Heart sounds: Normal heart sounds.   Pulmonary:      Effort: Pulmonary effort is normal.      Breath sounds: Normal breath sounds.   Abdominal:      General: Abdomen is flat.      Palpations: Abdomen is soft.      Tenderness: There is no abdominal tenderness. There is no guarding  or rebound.   Musculoskeletal:      Cervical back: Neck supple. No rigidity.      Comments: No tenderness to palpation L wrist   Skin:     Capillary Refill: Capillary refill takes less than 2 seconds.   Neurological:      General: No focal deficit present.      Mental Status: She is alert. Mental status is at baseline. She is disoriented.      Cranial Nerves: No cranial nerve deficit.      Sensory: No sensory deficit.      Motor: No weakness.   Psychiatric:         Mood and Affect: Mood normal.         Behavior: Behavior normal.     IMAGING SUMMARY:  (summary of new imaging findings, not a copy of dictation)  none    LABS:  Results from last 7 days   Lab Units 02/21/24  0643 02/20/24  0459   WBC AUTO x10*3/uL 8.2 9.0   HEMOGLOBIN g/dL 14.1 12.1   HEMATOCRIT % 42.8 36.1   PLATELETS AUTO x10*3/uL 469* 410   NEUTROS PCT AUTO %  --  68.3   LYMPHS PCT AUTO %  --  14.5   MONOS PCT AUTO %  --  8.8   EOS PCT AUTO %  --  7.4     Results from last 7 days   Lab Units 02/20/24  0459   APTT seconds 25*   INR  1.0     Results from last 7 days   Lab Units 02/21/24  0643 02/20/24  0459   SODIUM mmol/L 136 137   POTASSIUM mmol/L 4.8 4.3   CHLORIDE mmol/L 97* 98   CO2 mmol/L 28 30   BUN mg/dL 17 19   CREATININE mg/dL 0.82 0.82   CALCIUM mg/dL 10.0 9.2   PROTEIN TOTAL g/dL  --  6.6   BILIRUBIN TOTAL mg/dL  --  0.4   ALK PHOS U/L  --  85   ALT U/L  --  7   AST U/L  --  15   GLUCOSE mg/dL 80 80     Results from last 7 days   Lab Units 02/20/24  0459   BILIRUBIN TOTAL mg/dL 0.4         I have reviewed all medications, laboratory results, and imaging pertinent for today's encounter.    I saw and evaluated the patient. I personally obtained the key and critical portions of the history and physical exam. I reviewed the resident’s documentation and discussed the patient with the resident. I agree with the resident’s medical decision making as documented in the resident’s note.    83F dementia, GLF with Acute on chronic SDH (stable), L3 burst  fx (uprights stable), L radius/ulna fx (splinted). Pt clinically stable with pain controlled. PT recommending SNF. Discussed goals of care with the patient's daughters at bedside who clarified that she would want to be DNR/DNI. Appreciate geriatrics team recommendations and assistance.    Lance Ward MD  Trauma, Critical Care, and Acute Care Surgery  Pager: [phone]

## 2024-02-22 LAB
25(OH)D3 SERPL-MCNC: 12 NG/ML (ref 30–100)
ALBUMIN SERPL BCP-MCNC: 3.1 G/DL (ref 3.4–5)
ANION GAP SERPL CALC-SCNC: 14 MMOL/L (ref 10–20)
BUN SERPL-MCNC: 23 MG/DL (ref 6–23)
CALCIUM SERPL-MCNC: 9.1 MG/DL (ref 8.6–10.6)
CHLORIDE SERPL-SCNC: 100 MMOL/L (ref 98–107)
CO2 SERPL-SCNC: 27 MMOL/L (ref 21–32)
CREAT SERPL-MCNC: 0.95 MG/DL (ref 0.5–1.05)
EGFRCR SERPLBLD CKD-EPI 2021: 59 ML/MIN/1.73M*2
ERYTHROCYTE [DISTWIDTH] IN BLOOD BY AUTOMATED COUNT: 12.9 % (ref 11.5–14.5)
GLUCOSE SERPL-MCNC: 68 MG/DL (ref 74–99)
HCT VFR BLD AUTO: 38.2 % (ref 36–46)
HGB BLD-MCNC: 11.9 G/DL (ref 12–16)
MAGNESIUM SERPL-MCNC: 1.96 MG/DL (ref 1.6–2.4)
MCH RBC QN AUTO: 29.2 PG (ref 26–34)
MCHC RBC AUTO-ENTMCNC: 31.2 G/DL (ref 32–36)
MCV RBC AUTO: 94 FL (ref 80–100)
NRBC BLD-RTO: 0 /100 WBCS (ref 0–0)
PHOSPHATE SERPL-MCNC: 3.5 MG/DL (ref 2.5–4.9)
PLATELET # BLD AUTO: 405 X10*3/UL (ref 150–450)
POTASSIUM SERPL-SCNC: 4.1 MMOL/L (ref 3.5–5.3)
RBC # BLD AUTO: 4.07 X10*6/UL (ref 4–5.2)
SODIUM SERPL-SCNC: 137 MMOL/L (ref 136–145)
TSH SERPL-ACNC: 1.39 MIU/L (ref 0.44–3.98)
VIT B12 SERPL-MCNC: 201 PG/ML (ref 211–911)
WBC # BLD AUTO: 7.2 X10*3/UL (ref 4.4–11.3)

## 2024-02-22 PROCEDURE — 2500000001 HC RX 250 WO HCPCS SELF ADMINISTERED DRUGS (ALT 637 FOR MEDICARE OP)

## 2024-02-22 PROCEDURE — 2500000002 HC RX 250 W HCPCS SELF ADMINISTERED DRUGS (ALT 637 FOR MEDICARE OP, ALT 636 FOR OP/ED)

## 2024-02-22 PROCEDURE — 99232 SBSQ HOSP IP/OBS MODERATE 35: CPT | Performed by: SURGERY

## 2024-02-22 PROCEDURE — 2500000004 HC RX 250 GENERAL PHARMACY W/ HCPCS (ALT 636 FOR OP/ED)

## 2024-02-22 PROCEDURE — 85027 COMPLETE CBC AUTOMATED: CPT

## 2024-02-22 PROCEDURE — 84443 ASSAY THYROID STIM HORMONE: CPT

## 2024-02-22 PROCEDURE — 82306 VITAMIN D 25 HYDROXY: CPT

## 2024-02-22 PROCEDURE — 1100000001 HC PRIVATE ROOM DAILY

## 2024-02-22 PROCEDURE — 82607 VITAMIN B-12: CPT

## 2024-02-22 PROCEDURE — 80069 RENAL FUNCTION PANEL: CPT

## 2024-02-22 PROCEDURE — 83735 ASSAY OF MAGNESIUM: CPT

## 2024-02-22 PROCEDURE — 36415 COLL VENOUS BLD VENIPUNCTURE: CPT

## 2024-02-22 RX ORDER — AMLODIPINE BESYLATE 5 MG/1
2.5 TABLET ORAL DAILY
Status: DISCONTINUED | OUTPATIENT
Start: 2024-02-23 | End: 2024-02-24

## 2024-02-22 RX ORDER — MAGNESIUM SULFATE HEPTAHYDRATE 40 MG/ML
2 INJECTION, SOLUTION INTRAVENOUS ONCE
Status: COMPLETED | OUTPATIENT
Start: 2024-02-22 | End: 2024-02-22

## 2024-02-22 RX ADMIN — LEVETIRACETAM 500 MG: 500 TABLET, FILM COATED ORAL at 20:00

## 2024-02-22 RX ADMIN — ENOXAPARIN SODIUM 40 MG: 100 INJECTION SUBCUTANEOUS at 08:05

## 2024-02-22 RX ADMIN — ACETAMINOPHEN 975 MG: 325 TABLET ORAL at 00:43

## 2024-02-22 RX ADMIN — LEVETIRACETAM 500 MG: 5 INJECTION INTRAVENOUS at 01:04

## 2024-02-22 RX ADMIN — SERTRALINE HYDROCHLORIDE 50 MG: 50 TABLET ORAL at 08:05

## 2024-02-22 RX ADMIN — ACETAMINOPHEN 975 MG: 325 TABLET ORAL at 17:53

## 2024-02-22 RX ADMIN — MAGNESIUM SULFATE HEPTAHYDRATE 2 G: 40 INJECTION, SOLUTION INTRAVENOUS at 12:35

## 2024-02-22 RX ADMIN — AMLODIPINE BESYLATE 5 MG: 5 TABLET ORAL at 08:05

## 2024-02-22 RX ADMIN — SENNOSIDES AND DOCUSATE SODIUM 1 TABLET: 8.6; 5 TABLET ORAL at 20:00

## 2024-02-22 RX ADMIN — LEVETIRACETAM 500 MG: 500 TABLET, FILM COATED ORAL at 08:05

## 2024-02-22 ASSESSMENT — COGNITIVE AND FUNCTIONAL STATUS - GENERAL
DRESSING REGULAR LOWER BODY CLOTHING: TOTAL
DRESSING REGULAR UPPER BODY CLOTHING: TOTAL
MOVING TO AND FROM BED TO CHAIR: A LOT
MOVING FROM LYING ON BACK TO SITTING ON SIDE OF FLAT BED WITH BEDRAILS: A LITTLE
TOILETING: TOTAL
DAILY ACTIVITIY SCORE: 6
DRESSING REGULAR LOWER BODY CLOTHING: TOTAL
EATING MEALS: TOTAL
TOILETING: TOTAL
CLIMB 3 TO 5 STEPS WITH RAILING: TOTAL
STANDING UP FROM CHAIR USING ARMS: A LOT
CLIMB 3 TO 5 STEPS WITH RAILING: TOTAL
MOVING TO AND FROM BED TO CHAIR: A LOT
TURNING FROM BACK TO SIDE WHILE IN FLAT BAD: A LOT
MOBILITY SCORE: 11
DAILY ACTIVITIY SCORE: 6
MOVING FROM LYING ON BACK TO SITTING ON SIDE OF FLAT BED WITH BEDRAILS: A LITTLE
STANDING UP FROM CHAIR USING ARMS: A LOT
MOBILITY SCORE: 11
HELP NEEDED FOR BATHING: TOTAL
TURNING FROM BACK TO SIDE WHILE IN FLAT BAD: A LOT
HELP NEEDED FOR BATHING: TOTAL
DRESSING REGULAR UPPER BODY CLOTHING: TOTAL
EATING MEALS: TOTAL
PERSONAL GROOMING: TOTAL
PERSONAL GROOMING: TOTAL
WALKING IN HOSPITAL ROOM: TOTAL
WALKING IN HOSPITAL ROOM: TOTAL

## 2024-02-22 ASSESSMENT — PAIN SCALES - GENERAL
PAINLEVEL_OUTOF10: 0 - NO PAIN

## 2024-02-22 ASSESSMENT — PAIN - FUNCTIONAL ASSESSMENT
PAIN_FUNCTIONAL_ASSESSMENT: 0-10

## 2024-02-22 NOTE — CARE PLAN
The patient's goals for the shift include      The clinical goals for the shift include patient will remain safe and have no pain throughout shift    Problem: Pain  Goal: My pain/discomfort is manageable  Outcome: Progressing     Problem: Safety  Goal: Patient will be injury free during hospitalization  Outcome: Progressing  Goal: I will remain free of falls  Outcome: Progressing     Problem: Daily Care  Goal: Daily care needs are met  Outcome: Progressing     Problem: Psychosocial Needs  Goal: Demonstrates ability to cope with hospitalization/illness  Outcome: Progressing  Goal: Collaborate with me, my family, and caregiver to identify my specific goals  Outcome: Progressing     Problem: Discharge Barriers  Goal: My discharge needs are met  Outcome: Progressing

## 2024-02-22 NOTE — PROGRESS NOTES
Per [facility], unable to contact family as contact information listed is incorrect. Pending initial assessment. PT rec'd moderate intensity. SW left SNF list at bedside. SW notified bedside RN.      KARTHIK Jovel

## 2024-02-22 NOTE — PROGRESS NOTES
I attempted to meet with Olesya at the the bedside regarding discharge planning and home going needs. Patient is confused and can not participate in assessment, no family at the bedside. The phone number that is listed is a non working number. Patient is recommended for moderate intensity therapy at discharge.  Melany Michael left a list of SNF choices in the patient's room in case her family comes to the bedside. I will continue to follow with a safe discharge plan.

## 2024-02-23 PROCEDURE — 2500000001 HC RX 250 WO HCPCS SELF ADMINISTERED DRUGS (ALT 637 FOR MEDICARE OP)

## 2024-02-23 PROCEDURE — 1100000001 HC PRIVATE ROOM DAILY

## 2024-02-23 PROCEDURE — 2500000002 HC RX 250 W HCPCS SELF ADMINISTERED DRUGS (ALT 637 FOR MEDICARE OP, ALT 636 FOR OP/ED)

## 2024-02-23 PROCEDURE — 2500000004 HC RX 250 GENERAL PHARMACY W/ HCPCS (ALT 636 FOR OP/ED)

## 2024-02-23 PROCEDURE — 99232 SBSQ HOSP IP/OBS MODERATE 35: CPT | Performed by: SURGERY

## 2024-02-23 PROCEDURE — 99232 SBSQ HOSP IP/OBS MODERATE 35: CPT | Performed by: INTERNAL MEDICINE

## 2024-02-23 PROCEDURE — 2500000001 HC RX 250 WO HCPCS SELF ADMINISTERED DRUGS (ALT 637 FOR MEDICARE OP): Performed by: PHYSICIAN ASSISTANT

## 2024-02-23 RX ORDER — CHOLECALCIFEROL (VITAMIN D3) 25 MCG
2000 TABLET ORAL DAILY
Status: DISCONTINUED | OUTPATIENT
Start: 2024-04-27 | End: 2024-02-27 | Stop reason: HOSPADM

## 2024-02-23 RX ORDER — PNV NO.95/FERROUS FUM/FOLIC AC 28MG-0.8MG
250 TABLET ORAL DAILY
Status: DISCONTINUED | OUTPATIENT
Start: 2024-02-23 | End: 2024-02-27 | Stop reason: HOSPADM

## 2024-02-23 RX ORDER — ERGOCALCIFEROL 1.25 MG/1
1250 CAPSULE ORAL
Status: DISCONTINUED | OUTPATIENT
Start: 2024-02-24 | End: 2024-02-27 | Stop reason: HOSPADM

## 2024-02-23 RX ADMIN — LEVETIRACETAM 500 MG: 500 TABLET, FILM COATED ORAL at 21:08

## 2024-02-23 RX ADMIN — Medication 250 MCG: at 13:57

## 2024-02-23 RX ADMIN — LEVETIRACETAM 500 MG: 500 TABLET, FILM COATED ORAL at 09:40

## 2024-02-23 RX ADMIN — AMLODIPINE BESYLATE 2.5 MG: 5 TABLET ORAL at 09:40

## 2024-02-23 RX ADMIN — SERTRALINE HYDROCHLORIDE 50 MG: 50 TABLET ORAL at 09:40

## 2024-02-23 RX ADMIN — ACETAMINOPHEN 975 MG: 325 TABLET ORAL at 11:02

## 2024-02-23 RX ADMIN — ACETAMINOPHEN 975 MG: 325 TABLET ORAL at 17:45

## 2024-02-23 RX ADMIN — ACETAMINOPHEN 975 MG: 325 TABLET ORAL at 23:41

## 2024-02-23 RX ADMIN — SENNOSIDES AND DOCUSATE SODIUM 1 TABLET: 8.6; 5 TABLET ORAL at 21:08

## 2024-02-23 RX ADMIN — ENOXAPARIN SODIUM 40 MG: 100 INJECTION SUBCUTANEOUS at 09:40

## 2024-02-23 RX ADMIN — ACETAMINOPHEN 975 MG: 325 TABLET ORAL at 05:52

## 2024-02-23 ASSESSMENT — PAIN - FUNCTIONAL ASSESSMENT
PAIN_FUNCTIONAL_ASSESSMENT: 0-10

## 2024-02-23 ASSESSMENT — PAIN SCALES - GENERAL
PAINLEVEL_OUTOF10: 0 - NO PAIN

## 2024-02-23 NOTE — PROGRESS NOTES
SHANDA sent SNF list to daughter at [email or web address]. Will continue to follow.    KARTHIK Jovel

## 2024-02-23 NOTE — PROGRESS NOTES
Subjective   Follow-up for dementia with deconditioning. No events overnight. Patient was confused this morning about where she was and was asking for her daughter Liza. Denies any pain. Remains oriented to self-only (her baseline), was in a good mood.        Objective     Current Facility-Administered Medications   Medication Dose Route Frequency Provider Last Rate Last Admin    acetaminophen (Tylenol) tablet 975 mg  975 mg oral q6h [facility] Magali Goodwin MD   975 mg at 02/23/24 1102    amLODIPine (Norvasc) tablet 2.5 mg  2.5 mg oral Daily Reyes Berrios PA-C   2.5 mg at 02/23/24 0940    enoxaparin (Lovenox) syringe 40 mg  40 mg subcutaneous Daily Magali Goodwin MD   40 mg at 02/23/24 0940    levETIRAcetam (Keppra) tablet 500 mg  500 mg oral BID KAMLESH Cruz-CNP   500 mg at 02/23/24 0940    oxygen (O2) therapy   inhalation Continuous PRN - O2/gases Magali Goodwin MD        sennosides-docusate sodium (Sophy-Colace) 8.6-50 mg per tablet 1 tablet  1 tablet oral Nightly LAURA CruzCNP   1 tablet at 02/22/24 2000    sertraline (Zoloft) tablet 50 mg  50 mg oral Daily Magali Goodwin MD   50 mg at 02/23/24 0940       Physical Exam  Constitutional:       Appearance: Normal appearance.   HENT:      Head: Normocephalic.   Cardiovascular:      Rate and Rhythm: Normal rate and regular rhythm.   Pulmonary:      Effort: Pulmonary effort is normal.      Breath sounds: Normal breath sounds.   Abdominal:      General: Bowel sounds are normal.      Palpations: Abdomen is soft.   Neurological:      Mental Status: She is alert.         Confusion Assessment Method(CAM) for diagnosis of delirium:    1.  Acute onset or fluctuating course: absent/present: Absent  2.  Inattention: absent/present: Present  3.  Disorganized thinking: absent/present: Absent  4.  Altered level of consciousness: absent/present: Absent  CAM: negative    AT Score For Assessment of Delirium and Cognitive Impairment:    Alertness:  0  Normal(fully alert,but not agitated, throughout assessment)=0  Mild sleepiness for <10 seconds after walking, then normal=0  Clearly abnormal=4  2.  AMT4: 2  No mistakes=0  One mistake=1  Two or more mistakes/untestable=2  3.  Attention: 2  Achieves seven months or more correctly=0  Starts but scores <7 months/ refuses to start=1  Untestable(cannot start because unwell, drowsy, inattentive)=2  4.  Acute: 0  No=0  Yes=4    Total Score: 4  4 or above: Possible delirium +/- cognitive impairment  1-3: Possible cognitive impairment  0: Delirium or severe cognitive impairment unlikely(but delirium still possible if (4) information incomplete)      Last Recorded Vitals      2/22/2024     4:04 AM 2/22/2024     7:00 AM 2/22/2024    12:27 PM 2/22/2024     3:50 PM 2/22/2024     7:56 PM 2/22/2024    11:00 PM 2/23/2024     8:03 AM   Vitals   Systolic 148 158 115 115 119 99 130   Diastolic 69 75 67 64 63 60 70   Heart Rate 71 85 79 81 75 77 71   Temp 36.4 °C (97.5 °F) 36.8 °C (98.2 °F) 36.8 °C (98.2 °F) 36.5 °C (97.7 °F) 37.2 °C (99 °F) 36.7 °C (98.1 °F) 36.7 °C (98.1 °F)   Resp 16 16 16 16 16 16 16      There were no vitals filed for this visit.     Relevant Results  Lab Results   Component Value Date    TSH 1.39 02/22/2024    [id number] 201 (L) 02/22/2024    VITD25 12 (L) 02/22/2024     Results from last 7 days   Lab Units 02/22/24  0604 02/21/24  0643 02/20/24  0459   WBC AUTO x10*3/uL 7.2 8.2 9.0   HEMOGLOBIN g/dL 11.9* 14.1 12.1   HEMATOCRIT % 38.2 42.8 36.1   ALT U/L  --   --  7   AST U/L  --   --  15   SODIUM mmol/L 137 136 137   POTASSIUM mmol/L 4.1 4.8 4.3   CHLORIDE mmol/L 100 97* 98   CREATININE mg/dL 0.95 0.82 0.82   BUN mg/dL 23 17 19   CO2 mmol/L 27 28 30   INR   --   --  1.0       DATA:  EKG: QTC  Encounter Date: 02/19/24   ECG 12 lead   Result Value    Ventricular Rate 70    Atrial Rate 70    AR Interval 148    QRS Duration 82    QT Interval 422    QTC Calculation(Bazett) 455    P Axis 83    R Axis 53    T Axis  45    QRS Count 12    Q Onset 223    P Onset 149    P Offset 185    T Offset 434    QTC Fredericia 444    Narrative    See ED provider note for full interpretation and clinical correlation  Confirmed by Lance Stover ([id number]) on 2/20/2024 4:09:09 AM      Anti-psychotics in 48 hours: none  Opioids/Benzodiazepines in 48 hours: none  Anticholinergics on board:No  Restraints:No  Indwelling catheters:No  Last BM:  UO in 24 hours:  Activity in the past 24 hours:  Need for ambulatory devices:            Assessment/Plan   This is a/an 83 y.o. year old female, with past medical history relevant for dementia, CVA, orthostatic hypotension, presenting for unwitnessed fall (R DRFx 1/2024), also w L DR fx and acute on chronic R subdural hematoma being seen in geriatric consultation for dementia with severe deconditioning. It is unclear if there is a component of agitation as patient appeared to be slowly getting hyperactive on exam.      Active Problems:  There are no active Hospital Problems.     # Acute fall unclear etiology. H/o orthostatic hypotension   Plan:               - Agree with PT/OT Evals - Moderate intensity, assist x2.               - Check orthostatic vitals.               - Continue Amlodipine 2.5mg              - Consider telemetry monitoring    #Low vitamin D   - Start Vitamin D supplementation 50,000 units/week for 8 weeks, then 2,000 units daily.      # Acute L - comminuted fracture of distal radius, s/p acute fall  Patient's pain appears to be well controlled  Plan:               - Per orthopedic team, no acute intervention indicated.   - No heavy lifting/twisting/bending >10lbs  - uprights when able              - Continue Tylenol 975mg TID for pain control              - Consider oxycodone 2.5mg q6 PRN.      # L-sided subdural hematoma, s/p acute fall  Plan:               - Per neurosurgery, repeat head CT stable.                -Holding Plavix until post bleed day 10 and ASA until post bleed day 7.                 - Continue with Keppra IV per neurosurgery     # Dementia, mild to moderate, suspected vascular at baseline with deconditioning. No MoCa found on file. Unable to complete MMSE due to acute delirium.   Plan:   - Code change on 2/21/24, now DNR/DNI.      # Patient at risk for delirium   Please consider the following general measures for minimizing delirium in a hospitalized patient:   -Bright lights during the day, keeps blinds up, switch all lights on   -avoid disturbances at night. Encourage at least 6 hours uninterrupted sleep. Consider d/c 4am vitals check  -avoid benzodiazepines, sedatives. Minimize opioids   -avoid anti-cholinergics    -avoid restraints.   -Seroquel 12.5mg BID or low dose haldol 0.5mg PO (IM if PO not possible) only PRN for severe agitation where pt exhibits volatile behavior and is a threat to self or others. EKGs to monitor QTc   -daily orientation to time and place by the staff   -out of bed to chair few hours everyday  - encourage stimulating activities during the day if possible     #Misc  - Recommend encouraging incentive spirometry with nursing cues/instructions     #Medical Decision making capacity   - Patient lacks capacity to make decisions. Does not understand why she is in the hospital. Lacks capacity to appreciate her condition and was not able to provide logical reasoning for why she fell.      Medication Evaluation:   High risk medications: none  Other concerning issues regarding medications: none        4M AGE-FRIENDLY INITIATIVE:  What matters most to patient: being with her family  Medications: No high risk medications  Mentation: mild-moderate dementia, vascular, dependent on daughter for all IADLs and some BADLs   Mobility: has a walker for ambulation but usually does not use.      Geriatric medicine will continue to follow the patient. Thank you for allowing geriatric medicine to be involved in the care of your patient. Geriatric medicine consultation team is  available during work hours Monday through Friday. For any emergency issues requiring immediate assistance over the weekend, please page Geriatrics pager [phone].    WALT CAMACHO MS4

## 2024-02-23 NOTE — NURSING NOTE
Orthostatic BP     Laying down 148/68 HR 74    Sitting upright for approximately 5 minutes 97/59 HR 79    Standing for approximately 5 minutes 55/38 HR 97

## 2024-02-23 NOTE — PROGRESS NOTES
..[facility]  TRAUMA SERVICE - PROGRESS NOTE    Patient Name: Olesya Rodriguez  MRN: [id number]  Admit Date: 219  : 1940  AGE: 83 y.o.   GENDER: female  ==============================================================================  MECHANISM OF INJURY / CHIEF COMPLAINT:   84yo female unwitnessed fall    LOC (yes/no?): Unknown  Anticoagulant / Anti-platelet Rx? (for what dx?): ASA Plavix Past CVA  Referring Facility Name (N/A for scene EMR run): [facility]      INJURIES:   L Distal Radius fx  L ulnar styloid fx  Acute, comminuted burst type compression fracture of the L3 vertebral body with approximately 40% vertebral body height loss.  Acute on chronic right subdural hematoma measuring up to 9 mm in maximal thickness with minimal adjacent mass effect. No midline shift.     OTHER MEDICAL PROBLEMS:  orthostatic hypotension   CVA unknown if residual deficit  Vascular Dementia  Hypertension     INCIDENTAL FINDINGS:  Cystic Pancreatic lesion     ==============================================================================  TODAY'S ASSESSMENT AND PLAN OF CARE:  Assessment: 83 year old female with left wrist fractures, T3 burst fracture, and acute on chronic subdural hematoma.     PLAN:  Acute on Chronic SDH  -Repeat CTH showed increase in SDH from 9mm to 10mm. Next repeat CT showed stability.   -Neurosurgery consult  -Okay for DVT ppx post bleed day 2  -Okay for ASA post bleed day 7 and PLX post bleed day 10    Orthostatic Hypotension  -home amlodipine decreased to 2.5mg  -repeat orthostats today     Burst Fx L3  -ortho surg consulted  -upright images reviewed by ortho  -no acute intervention  -avoid extreme twisting/bending  -no weightbearing instructions    L Distal Radius fx  L ulnar styloid fx  -splinted by orthopedic surgery  -no operative intervention  -follow up outpt      - Analgesia: Scheduled Tylenol,  Lidoderm patch, Toradol, Gabapentin, Oxy 5/10 Q4h PRN, Dilaudid BT  -  PT/OT rec'ed moderate intensity (SNF)     ##FEN/GI/  - regular diet  - BR with Sophy-Colace and Miralax  - Monitor electrolytes and replete as clinically indicated  - Monitor fluid status and replete as clinically indicated  - Voiding spontaneously   - Strict I/Os  - AM labs prn  -Start Vitamin D supplementation 50,000 units/week for 8 weeks, then 2,000 units daily   -start B12 supplementation     ##PPX  -subcutaneous lovenox (started post bleed day 2)  - SCDs     Dispo: RNF, medically ready for discharge; social work unable to contact daughters regarding choices    Patient seen and plan discussed with attending, Dr. Ward.     Magali Goodwin MD- PGY1  Trauma Surgery  Floor: [phone]    ==============================================================================  CHIEF COMPLAINT / OVERNIGHT EVENTS:   No acute overnight events. Pt reports she does not feel any pain. Tolerating diet. States she is very comfortable. Unsure if her daughters are visiting today.     MEDICAL HISTORY / ROS:  Admission history and ROS reviewed. Pertinent changes as follows:  none    PHYSICAL EXAM:  Heart Rate:  [71-81]   Temp:  [36.5 °C (97.7 °F)-37.2 °C (99 °F)]   Resp:  [16]   BP: ()/(60-70)   SpO2:  [94 %-97 %]     Constitutional:       General: She is not in acute distress.     Appearance: She is not ill-appearing.   HENT:      Head:      Comments: L frontal old hematoma     Right Ear: External ear normal.      Left Ear: External ear normal.      Nose: Nose normal.      Mouth/Throat:      Mouth: Mucous membranes are moist.      Pharynx: Oropharynx is clear.   Eyes:      General:         Right eye: No discharge.         Left eye: No discharge.      Extraocular Movements: Extraocular movements intact.      Pupils: Pupils are equal, round, and reactive to light.   Cardiovascular:      Rate and Rhythm: Normal rate and regular rhythm.      Pulses: Normal pulses.      Heart sounds: Normal heart sounds.   Pulmonary:      Effort:  Pulmonary effort is normal.      Breath sounds: Normal breath sounds.   Abdominal:      General: Abdomen is flat.      Palpations: Abdomen is soft.      Tenderness: There is no abdominal tenderness. There is no guarding or rebound.   Musculoskeletal:      Cervical back: Neck supple. No rigidity.      Comments: No tenderness to palpation L wrist   Skin:     Capillary Refill: Capillary refill takes less than 2 seconds.   Neurological:      General: No focal deficit present.      Mental Status: She is alert. Mental status is at baseline.    Cranial Nerves: No cranial nerve deficit.      Sensory: No sensory deficit.      Motor: No weakness.   Psychiatric:         Mood and Affect: Mood normal.         Behavior: Behavior normal.     IMAGING SUMMARY:  (summary of new imaging findings, not a copy of dictation)  none    LABS:  Results from last 7 days   Lab Units 02/22/24  0604 02/21/24  0643 02/20/24  0459   WBC AUTO x10*3/uL 7.2 8.2 9.0   HEMOGLOBIN g/dL 11.9* 14.1 12.1   HEMATOCRIT % 38.2 42.8 36.1   PLATELETS AUTO x10*3/uL 405 469* 410   NEUTROS PCT AUTO %  --   --  68.3   LYMPHS PCT AUTO %  --   --  14.5   MONOS PCT AUTO %  --   --  8.8   EOS PCT AUTO %  --   --  7.4     Results from last 7 days   Lab Units 02/20/24  0459   APTT seconds 25*   INR  1.0     Results from last 7 days   Lab Units 02/22/24  0604 02/21/24  0643 02/20/24  0459   SODIUM mmol/L 137 136 137   POTASSIUM mmol/L 4.1 4.8 4.3   CHLORIDE mmol/L 100 97* 98   CO2 mmol/L 27 28 30   BUN mg/dL 23 17 19   CREATININE mg/dL 0.95 0.82 0.82   CALCIUM mg/dL 9.1 10.0 9.2   PROTEIN TOTAL g/dL  --   --  6.6   BILIRUBIN TOTAL mg/dL  --   --  0.4   ALK PHOS U/L  --   --  85   ALT U/L  --   --  7   AST U/L  --   --  15   GLUCOSE mg/dL 68* 80 80     Results from last 7 days   Lab Units 02/20/24  0459   BILIRUBIN TOTAL mg/dL 0.4     I saw and evaluated the patient. I personally obtained the key and critical portions of the history and physical exam. I reviewed the  resident’s documentation and discussed the patient with the resident. I agree with the resident’s medical decision making as documented in the resident’s note.    Pt comfortable. Orthostatic today so will try to make sure she is up in the chair as much as possible during the days. Appreciate ongoing Geriatrics support. Working towards SNF.    Lance Ward MD  Trauma, Critical Care, and Acute Care Surgery  Pager: [phone]

## 2024-02-24 PROCEDURE — 2500000004 HC RX 250 GENERAL PHARMACY W/ HCPCS (ALT 636 FOR OP/ED): Performed by: NURSE PRACTITIONER

## 2024-02-24 PROCEDURE — 2500000004 HC RX 250 GENERAL PHARMACY W/ HCPCS (ALT 636 FOR OP/ED)

## 2024-02-24 PROCEDURE — 99232 SBSQ HOSP IP/OBS MODERATE 35: CPT | Performed by: NURSE PRACTITIONER

## 2024-02-24 PROCEDURE — 2500000001 HC RX 250 WO HCPCS SELF ADMINISTERED DRUGS (ALT 637 FOR MEDICARE OP)

## 2024-02-24 PROCEDURE — 2500000002 HC RX 250 W HCPCS SELF ADMINISTERED DRUGS (ALT 637 FOR MEDICARE OP, ALT 636 FOR OP/ED)

## 2024-02-24 PROCEDURE — 1100000001 HC PRIVATE ROOM DAILY

## 2024-02-24 PROCEDURE — 2500000001 HC RX 250 WO HCPCS SELF ADMINISTERED DRUGS (ALT 637 FOR MEDICARE OP): Performed by: PHYSICIAN ASSISTANT

## 2024-02-24 RX ORDER — ACETAMINOPHEN 325 MG/1
975 TABLET ORAL EVERY 6 HOURS PRN
Status: DISCONTINUED | OUTPATIENT
Start: 2024-02-24 | End: 2024-02-27 | Stop reason: HOSPADM

## 2024-02-24 RX ADMIN — ENOXAPARIN SODIUM 40 MG: 100 INJECTION SUBCUTANEOUS at 10:33

## 2024-02-24 RX ADMIN — SENNOSIDES AND DOCUSATE SODIUM 1 TABLET: 8.6; 5 TABLET ORAL at 20:53

## 2024-02-24 RX ADMIN — Medication 250 MCG: at 10:33

## 2024-02-24 RX ADMIN — AMLODIPINE BESYLATE 2.5 MG: 5 TABLET ORAL at 10:34

## 2024-02-24 RX ADMIN — ACETAMINOPHEN 975 MG: 325 TABLET ORAL at 06:09

## 2024-02-24 RX ADMIN — SERTRALINE HYDROCHLORIDE 50 MG: 50 TABLET ORAL at 10:33

## 2024-02-24 RX ADMIN — LEVETIRACETAM 500 MG: 500 TABLET, FILM COATED ORAL at 10:33

## 2024-02-24 RX ADMIN — LEVETIRACETAM 500 MG: 500 TABLET, FILM COATED ORAL at 20:53

## 2024-02-24 RX ADMIN — ERGOCALCIFEROL 1250 MCG: 1.25 CAPSULE ORAL at 10:36

## 2024-02-24 RX ADMIN — SODIUM CHLORIDE, SODIUM LACTATE, POTASSIUM CHLORIDE, AND CALCIUM CHLORIDE 500 ML: 600; 310; 30; 20 INJECTION, SOLUTION INTRAVENOUS at 12:45

## 2024-02-24 ASSESSMENT — COGNITIVE AND FUNCTIONAL STATUS - GENERAL
MOBILITY SCORE: 16
EATING MEALS: A LITTLE
TURNING FROM BACK TO SIDE WHILE IN FLAT BAD: A LITTLE
WALKING IN HOSPITAL ROOM: A LOT
MOVING FROM LYING ON BACK TO SITTING ON SIDE OF FLAT BED WITH BEDRAILS: A LITTLE
HELP NEEDED FOR BATHING: A LOT
STANDING UP FROM CHAIR USING ARMS: A LITTLE
DAILY ACTIVITIY SCORE: 14
DRESSING REGULAR UPPER BODY CLOTHING: A LOT
PERSONAL GROOMING: A LOT
MOVING TO AND FROM BED TO CHAIR: A LITTLE
DRESSING REGULAR LOWER BODY CLOTHING: A LOT
CLIMB 3 TO 5 STEPS WITH RAILING: A LOT
TOILETING: A LITTLE

## 2024-02-24 ASSESSMENT — PAIN SCALES - WONG BAKER: WONGBAKER_NUMERICALRESPONSE: NO HURT

## 2024-02-24 ASSESSMENT — PAIN SCALES - GENERAL: PAINLEVEL_OUTOF10: 0 - NO PAIN

## 2024-02-24 NOTE — CARE PLAN
The patient's goals for the shift include      The clinical goals for the shift include The patient will remain safe during this shift    Over the shift, the patient did not make progress toward the following goals. Barriers to progression include   Problem: Pain  Goal: My pain/discomfort is manageable  Outcome: Progressing     Problem: Safety  Goal: Patient will be injury free during hospitalization  Outcome: Progressing  Goal: I will remain free of falls  Outcome: Progressing     Problem: Daily Care  Goal: Daily care needs are met  Outcome: Progressing     Problem: Psychosocial Needs  Goal: Demonstrates ability to cope with hospitalization/illness  Outcome: Progressing  Goal: Collaborate with me, my family, and caregiver to identify my specific goals  Outcome: Progressing     Problem: Discharge Barriers  Goal: My discharge needs are met  Outcome: Progressing     Problem: Fall/Injury  Goal: Not fall by end of shift  Outcome: Progressing  Goal: Be free from injury by end of the shift  Outcome: Progressing  Goal: Verbalize understanding of personal risk factors for fall in the hospital  Outcome: Progressing  Goal: Verbalize understanding of risk factor reduction measures to prevent injury from fall in the home  Outcome: Progressing  Goal: Use assistive devices by end of the shift  Outcome: Progressing  Goal: Pace activities to prevent fatigue by end of the shift  Outcome: Progressing     Problem: Skin  Goal: Decreased wound size/increased tissue granulation at next dressing change  2/24/2024 0014 by Elena Bass RN  Outcome: Progressing  2/24/2024 0013 by Elena Bass RN  Flowsheets (Taken 2/24/2024 0013)  Decreased wound size/increased tissue granulation at next dressing change:   Promote sleep for wound healing   Protective dressings over bony prominences  Goal: Participates in plan/prevention/treatment measures  2/24/2024 0014 by Elena Bass RN  Outcome: Progressing  2/24/2024 0013 by Elena Bass  RN  Flowsheets (Taken 2/24/2024 0013)  Participates in plan/prevention/treatment measures:   Discuss with provider PT/OT consult   Elevate heels   Increase activity/out of bed for meals  Goal: Prevent/manage excess moisture  2/24/2024 0014 by Elena Bass RN  Outcome: Progressing  2/24/2024 0013 by Elena Bass RN  Flowsheets (Taken 2/24/2024 0013)  Prevent/manage excess moisture:   Cleanse incontinence/protect with barrier cream   Use wicking fabric (obtain order)   Moisturize dry skin  Goal: Prevent/minimize sheer/friction injuries  2/24/2024 0014 by Elena Bass RN  Outcome: Progressing  2/24/2024 0013 by Elena Bass RN  Flowsheets (Taken 2/24/2024 0013)  Prevent/minimize sheer/friction injuries:   Increase activity/out of bed for meals   Use pull sheet   HOB 30 degrees or less   Turn/reposition every 2 hours/use positioning/transfer devices  Goal: Promote/optimize nutrition  2/24/2024 0014 by Elena Bass RN  Outcome: Progressing  2/24/2024 0013 by Elena Bass RN  Flowsheets (Taken 2/24/2024 0013)  Promote/optimize nutrition:   Assist with feeding   Monitor/record intake including meals  Goal: Promote skin healing  2/24/2024 0014 by Elena Bass RN  Outcome: Progressing  2/24/2024 0013 by Elena Bass RN  Flowsheets (Taken 2/24/2024 0013)  Promote skin healing:   Turn/reposition every 2 hours/use positioning/transfer devices   Assess skin/pad under line(s)/device(s)   . Recommendations to address these barriers include .

## 2024-02-24 NOTE — PROGRESS NOTES
Spoke to both of pts daughters via phone together regarding SNF list. Both asked that I send updated lists using [place] code for [place] as [place] closer to the both of them. Both lists sent to daughter Liza's email. To follow up on choices.     1427: Spoke to pts daughter, Liza. [facility] was provided[facility], [facility], [facility], [facility].

## 2024-02-24 NOTE — PROGRESS NOTES
..[facility]  TRAUMA SERVICE - PROGRESS NOTE    Patient Name: Olesya Rodriguez  MRN: [id number]  Admit Date: 219  : 1940  AGE: 83 y.o.   GENDER: female  ==============================================================================  MECHANISM OF INJURY / CHIEF COMPLAINT:   84yo female unwitnessed fall    LOC (yes/no?): Unknown  Anticoagulant / Anti-platelet Rx? (for what dx?): ASA Plavix Past CVA  Referring Facility Name (N/A for scene EMR run): [facility]      INJURIES:   L Distal Radius fx  L ulnar styloid fx  Acute, comminuted burst type compression fracture of the L3 vertebral body with approximately 40% vertebral body height loss.  Acute on chronic right subdural hematoma measuring up to 9 mm in maximal thickness with minimal adjacent mass effect. No midline shift.     OTHER MEDICAL PROBLEMS:  orthostatic hypotension   CVA unknown if residual deficit  Vascular Dementia  Hypertension     INCIDENTAL FINDINGS:  Cystic Pancreatic lesion     ==============================================================================  TODAY'S ASSESSMENT AND PLAN OF CARE:  Assessment: 83 year old female with left wrist fractures, T3 burst fracture, and acute on chronic subdural hematoma.     PLAN:  Acute on Chronic SDH  -Repeat CTH showed increase in SDH from 9mm to 10mm. Next repeat CT showed stability.   -Neurosurgery consult, now signed off with no follow up needed  -Okay for DVT ppx post bleed day 2  -Okay for ASA post bleed day 7 () and PLX post bleed day 10 ()    Orthostatic Hypotension  -home amlodipine decreased to 2.5mg  -repeat orthostats today positive, given 500mL bolus will repeat orthostatics after     Burst Fx L3  -ortho surg consulted>  -upright images reviewed by ortho  -no acute intervention  -avoid extreme twisting/bending  -no weightbearing instructions    L Distal Radius fx, known R radius fx (2023)  L ulnar styloid fx  -splinted by orthopedic surgery  -no  operative intervention  -follow up outpt   -short arm splint RUE from 12/2023, STS LUE from acute fracture, NWB BL upper extremities      - Analgesia: No longer requiring narcotics, scheduled tylenol changed to PRN   - PT/OT rec'd moderate intensity (SNF) awaiting choices     ##FEN/GI/  - regular diet  - BR with Sophy-Colace and Miralax  - Monitor electrolytes and replete as clinically indicated  - Voiding spontaneously   - Strict I/Os  - AM labs prn  - Margo consult>medication changes/labs obtained       ->2/23 started Vitamin D supplementation 50,000 units/week for 8 weeks, then 2,000 units daily        ->2/23 started B12 supplementation     ##PPX  -subcutaneous lovenox   - SCDs     Dispo: RNF, medically ready for discharge; referrals for SNF sent today by   Total face to face time spent with patient/family of 15 minutes, with >50% of the time spent discussing plan of care/management, counseling/educating on disease processes, explaining results of diagnostic testing.    Patient discussed with attending, Dr. Ward.     Susna Barragan, KAMLESH-LITZY    ==============================================================================  CHIEF COMPLAINT / OVERNIGHT EVENTS:   KRISTIE, patient laying in bed, appears comfortable. She reported feeling hungry and was able to feed herself once she was sat up in bed. She had no complaints of pain or n/t and has been tolerating food well.     MEDICAL HISTORY / ROS:  Admission history and ROS reviewed. Pertinent changes as follows:  none    PHYSICAL EXAM:  Heart Rate:  [68-71]   Temp:  [35.9 °C (96.6 °F)-36.7 °C (98.1 °F)]   Resp:  [16]   BP: (130-157)/(69-70)   SpO2:  [95 %-97 %]     Constitutional:       General: She is not in acute distress.     Appearance: She is not ill-appearing.   HENT:      Head:      Comments: evolving L frontal hematoma     Right Ear: External ear normal.      Left Ear: External ear normal.      Nose: Nose normal.      Mouth/Throat:      Mouth: Mucous membranes  are moist.      Pharynx: Oropharynx is clear.   Eyes:      General:         Right eye: No discharge.         Left eye: No discharge.      Extraocular Movements: Extraocular movements intact.      Pupils: Pupils are equal, round, and reactive to light.   Cardiovascular:      Rate and Rhythm: Normal rate and regular rhythm.      Pulses: Normal pulses.      Heart sounds: Normal heart sounds.   Pulmonary:      Effort: Pulmonary effort is normal.      Breath sounds: Normal breath sounds.   Abdominal:      General: Abdomen is flat.      Palpations: Abdomen is soft.      Tenderness: There is no abdominal tenderness. There is no guarding or rebound.   Musculoskeletal:      Cervical back: Neck supple. No rigidity.      Comments: No tenderness to palpation L wrist. RUE in splint, LUE in sling   Skin:     Capillary Refill: Capillary refill takes less than 2 seconds.   Neurological:      General: No focal deficit present.      Mental Status: She is alert. Mental status is at baseline.    Cranial Nerves: No cranial nerve deficit.      Sensory: No sensory deficit.      Motor: No weakness.   Psychiatric:         Mood and Affect: Mood normal.         Behavior: Behavior normal.     IMAGING SUMMARY:  (summary of new imaging findings, not a copy of dictation)  none    LABS:  Results from last 7 days   Lab Units 02/22/24  0604 02/21/24  0643 02/20/24  0459   WBC AUTO x10*3/uL 7.2 8.2 9.0   HEMOGLOBIN g/dL 11.9* 14.1 12.1   HEMATOCRIT % 38.2 42.8 36.1   PLATELETS AUTO x10*3/uL 405 469* 410   NEUTROS PCT AUTO %  --   --  68.3   LYMPHS PCT AUTO %  --   --  14.5   MONOS PCT AUTO %  --   --  8.8   EOS PCT AUTO %  --   --  7.4     Results from last 7 days   Lab Units 02/20/24  0459   APTT seconds 25*   INR  1.0     Results from last 7 days   Lab Units 02/22/24  0604 02/21/24  0643 02/20/24  0459   SODIUM mmol/L 137 136 137   POTASSIUM mmol/L 4.1 4.8 4.3   CHLORIDE mmol/L 100 97* 98   CO2 mmol/L 27 28 30   BUN mg/dL 23 17 19   CREATININE  mg/dL 0.95 0.82 0.82   CALCIUM mg/dL 9.1 10.0 9.2   PROTEIN TOTAL g/dL  --   --  6.6   BILIRUBIN TOTAL mg/dL  --   --  0.4   ALK PHOS U/L  --   --  85   ALT U/L  --   --  7   AST U/L  --   --  15   GLUCOSE mg/dL 68* 80 80     Results from last 7 days   Lab Units 02/20/24  0459   BILIRUBIN TOTAL mg/dL 0.4

## 2024-02-25 PROCEDURE — 2500000001 HC RX 250 WO HCPCS SELF ADMINISTERED DRUGS (ALT 637 FOR MEDICARE OP)

## 2024-02-25 PROCEDURE — 2500000002 HC RX 250 W HCPCS SELF ADMINISTERED DRUGS (ALT 637 FOR MEDICARE OP, ALT 636 FOR OP/ED)

## 2024-02-25 PROCEDURE — 2500000004 HC RX 250 GENERAL PHARMACY W/ HCPCS (ALT 636 FOR OP/ED)

## 2024-02-25 PROCEDURE — 99232 SBSQ HOSP IP/OBS MODERATE 35: CPT | Performed by: NURSE PRACTITIONER

## 2024-02-25 PROCEDURE — 1100000001 HC PRIVATE ROOM DAILY

## 2024-02-25 PROCEDURE — 2500000001 HC RX 250 WO HCPCS SELF ADMINISTERED DRUGS (ALT 637 FOR MEDICARE OP): Performed by: NURSE PRACTITIONER

## 2024-02-25 RX ORDER — AMLODIPINE BESYLATE 5 MG/1
2.5 TABLET ORAL DAILY
Status: DISCONTINUED | OUTPATIENT
Start: 2024-02-25 | End: 2024-02-27 | Stop reason: HOSPADM

## 2024-02-25 RX ADMIN — LEVETIRACETAM 500 MG: 500 TABLET, FILM COATED ORAL at 10:27

## 2024-02-25 RX ADMIN — SENNOSIDES AND DOCUSATE SODIUM 1 TABLET: 8.6; 5 TABLET ORAL at 20:32

## 2024-02-25 RX ADMIN — ENOXAPARIN SODIUM 40 MG: 100 INJECTION SUBCUTANEOUS at 10:26

## 2024-02-25 RX ADMIN — AMLODIPINE BESYLATE 2.5 MG: 5 TABLET ORAL at 10:26

## 2024-02-25 RX ADMIN — SERTRALINE HYDROCHLORIDE 50 MG: 50 TABLET ORAL at 10:26

## 2024-02-25 RX ADMIN — Medication 250 MCG: at 10:25

## 2024-02-25 RX ADMIN — LEVETIRACETAM 500 MG: 500 TABLET, FILM COATED ORAL at 20:32

## 2024-02-25 ASSESSMENT — COGNITIVE AND FUNCTIONAL STATUS - GENERAL
DAILY ACTIVITIY SCORE: 14
EATING MEALS: A LITTLE
TURNING FROM BACK TO SIDE WHILE IN FLAT BAD: A LITTLE
CLIMB 3 TO 5 STEPS WITH RAILING: A LOT
PERSONAL GROOMING: A LOT
TOILETING: A LITTLE
HELP NEEDED FOR BATHING: A LOT
MOBILITY SCORE: 16
DRESSING REGULAR LOWER BODY CLOTHING: A LOT
DRESSING REGULAR UPPER BODY CLOTHING: A LOT
MOVING TO AND FROM BED TO CHAIR: A LITTLE
STANDING UP FROM CHAIR USING ARMS: A LITTLE
WALKING IN HOSPITAL ROOM: A LOT
MOVING FROM LYING ON BACK TO SITTING ON SIDE OF FLAT BED WITH BEDRAILS: A LITTLE

## 2024-02-25 ASSESSMENT — PAIN SCALES - GENERAL
PAINLEVEL_OUTOF10: 0 - NO PAIN

## 2024-02-25 ASSESSMENT — PAIN - FUNCTIONAL ASSESSMENT
PAIN_FUNCTIONAL_ASSESSMENT: 0-10

## 2024-02-25 NOTE — PROGRESS NOTES
..[facility]  TRAUMA SERVICE - PROGRESS NOTE    Patient Name: Olesya Rodriguez  MRN: [id number]  Admit Date: 219  : 1940  AGE: 83 y.o.   GENDER: female  ==============================================================================  MECHANISM OF INJURY / CHIEF COMPLAINT:   84yo female unwitnessed fall    LOC (yes/no?): Unknown  Anticoagulant / Anti-platelet Rx? (for what dx?): ASA Plavix Past CVA  Referring Facility Name (N/A for scene EMR run): [facility]      INJURIES:   L Distal Radius fx  L ulnar styloid fx  Acute, comminuted burst type compression fracture of the L3 vertebral body with approximately 40% vertebral body height loss.  Acute on chronic right subdural hematoma measuring up to 9 mm in maximal thickness with minimal adjacent mass effect. No midline shift.     OTHER MEDICAL PROBLEMS:  orthostatic hypotension   CVA unknown if residual deficit  Vascular Dementia  Hypertension     INCIDENTAL FINDINGS:  Cystic Pancreatic lesion     ==============================================================================  TODAY'S ASSESSMENT AND PLAN OF CARE:  Assessment: 83 year old female with left wrist fractures, T3 burst fracture, and acute on chronic subdural hematoma.     PLAN:  ##Acute on Chronic SDH  -Repeat CTH showed increase in SDH from 9mm to 10mm. Next repeat CT showed stability.   -Neurosurgery consult, now signed off with no follow up needed  -Okay for DVT ppx post bleed day 2  -Okay for ASA post bleed day 7 () and PLX post bleed day 10 ()    ##Orthostatic Hypotension  -home amlodipine decreased to 2.5mg  -given small fluid bolus yesterday. Denies dizziness today    - Margo consult>medication changes/labs obtained       -> started Vitamin D supplementation 50,000 units/week for 8 weeks, then 2,000 units daily        -> started B12 supplementation    ##Burst Fx L3  -ortho surg consulted>  -upright images reviewed by ortho  -no acute  intervention  -avoid extreme twisting/bending  -no weightbearing instructions    ##L Distal Radius fx, known R radius fx (12/2023)  ##L ulnar styloid fx  -splinted by orthopedic surgery  -no operative intervention  -follow up outpt   -short arm splint RUE from 12/2023, STS LUE from acute fracture, NWB BL upper extremities      #Post acute pain  - No longer requiring narcotics, scheduled tylenol changed to PRN   - PT/OT rec'd moderate intensity (SNF), referrals sent     ##FEN/GI/  - regular diet  - BR with Sophy-Colace and Miralax  - Monitor electrolytes and replete as clinically indicated  - Voiding spontaneously   - Strict I/Os  - AM labs prn     ##PPX  -subcutaneous lovenox   - SCDs     Dispo: RNF, medically ready for discharge. Referrals sent 2/24.   Total face to face time spent with patient/family of 15 minutes, with >50% of the time spent discussing plan of care/management, counseling/educating on disease processes, explaining results of diagnostic testing.    Patient discussed with attending, Dr. Ward.   Total face to face time spent with patient/family of 10 minutes, with >50% of the time spent discussing plan of care/management, counseling/educating on disease processes, explaining results of diagnostic testing.    Susan Barragan, APRN-CNP    ==============================================================================  CHIEF COMPLAINT / OVERNIGHT EVENTS:   KRISTIE, patient laying in bed, appears comfortable. She reports no pain, good appetite and has no complaints today. She denies nausea or dizziness.     MEDICAL HISTORY / ROS:  Admission history and ROS reviewed. Pertinent changes as follows:  none    PHYSICAL EXAM:  Heart Rate:  [69-99]   Temp:  [35.7 °C (96.3 °F)-36.5 °C (97.7 °F)]   Resp:  [14-16]   BP: ()/(55-80)   SpO2:  [96 %-99 %]     Constitutional:       General: She is not in acute distress.     Appearance: She is not ill-appearing.   HENT:      Head:      Comments: evolving L frontal  hematoma, improving     Right Ear: External ear normal.      Left Ear: External ear normal.      Nose: Nose normal.      Mouth/Throat:      Mouth: Mucous membranes are moist.      Pharynx: Oropharynx is clear.   Eyes:      General:         Right eye: No discharge.         Left eye: No discharge.      Extraocular Movements: Extraocular movements intact.      Pupils: Pupils are equal, round, and reactive to light.   Cardiovascular:      Rate and Rhythm: Normal rate and regular rhythm.      Pulses: Normal pulses.      Heart sounds: Normal heart sounds.   Pulmonary:      Effort: Pulmonary effort is normal.      Breath sounds: Normal breath sounds.   Abdominal:      General: Abdomen is flat.      Palpations: Abdomen is soft.      Tenderness: There is no abdominal tenderness. There is no guarding or rebound.   Musculoskeletal:      Cervical back: Neck supple. No rigidity.      Comments: No tenderness to palpation L wrist. RUE in splint, LUE in sling   Skin:     Capillary Refill: Capillary refill takes less than 2 seconds.   Neurological:      General: No focal deficit present.      Mental Status: She is alert. Mental status is at baseline.    Cranial Nerves: No cranial nerve deficit.      Sensory: No sensory deficit.      Motor: No weakness.   Psychiatric:         Mood and Affect: Mood normal.         Behavior: Behavior normal.     IMAGING SUMMARY:  (summary of new imaging findings, not a copy of dictation)  none    LABS:  Results from last 7 days   Lab Units 02/22/24  0604 02/21/24  0643 02/20/24  0459   WBC AUTO x10*3/uL 7.2 8.2 9.0   HEMOGLOBIN g/dL 11.9* 14.1 12.1   HEMATOCRIT % 38.2 42.8 36.1   PLATELETS AUTO x10*3/uL 405 469* 410   NEUTROS PCT AUTO %  --   --  68.3   LYMPHS PCT AUTO %  --   --  14.5   MONOS PCT AUTO %  --   --  8.8   EOS PCT AUTO %  --   --  7.4     Results from last 7 days   Lab Units 02/20/24  0459   APTT seconds 25*   INR  1.0     Results from last 7 days   Lab Units 02/22/24  0604 02/21/24  0643  02/20/24  0459   SODIUM mmol/L 137 136 137   POTASSIUM mmol/L 4.1 4.8 4.3   CHLORIDE mmol/L 100 97* 98   CO2 mmol/L 27 28 30   BUN mg/dL 23 17 19   CREATININE mg/dL 0.95 0.82 0.82   CALCIUM mg/dL 9.1 10.0 9.2   PROTEIN TOTAL g/dL  --   --  6.6   BILIRUBIN TOTAL mg/dL  --   --  0.4   ALK PHOS U/L  --   --  85   ALT U/L  --   --  7   AST U/L  --   --  15   GLUCOSE mg/dL 68* 80 80     Results from last 7 days   Lab Units 02/20/24  0459   BILIRUBIN TOTAL mg/dL 0.4   Susan Barragan, APRN-CNP

## 2024-02-25 NOTE — PROGRESS NOTES
Transitional Care Coordinator Progress Note:   Spoke with pt's dtrs Liza ([phone]) and Lucia via phone to discuss SNF acceptances, dtrs chose [facility] SN[facility] as FOC.  Updated therapy notes requested (message placed in the PT/TCC and OT/TCC columns).  [facility] notified they are F[facility] via [facility] and requested them to initiate precert on 2/26.  Per CNP Susan De Jesus, pt is medically ready today, requested a completed gold form.    Bre Villar MSN, RN-BC  Transitional Care Coordinator (TCC)  [phone]

## 2024-02-25 NOTE — CARE PLAN
The patient's goals for the shift include      The clinical goals for the shift include free from falls throughout shift      Problem: Pain  Goal: My pain/discomfort is manageable  Outcome: Progressing     Problem: Safety  Goal: Patient will be injury free during hospitalization  Outcome: Progressing     Problem: Daily Care  Goal: Daily care needs are met  Outcome: Progressing

## 2024-02-26 LAB
ALBUMIN SERPL BCP-MCNC: 3.4 G/DL (ref 3.4–5)
ANION GAP SERPL CALC-SCNC: 11 MMOL/L (ref 10–20)
BUN SERPL-MCNC: 16 MG/DL (ref 6–23)
CALCIUM SERPL-MCNC: 9.4 MG/DL (ref 8.6–10.6)
CHLORIDE SERPL-SCNC: 102 MMOL/L (ref 98–107)
CO2 SERPL-SCNC: 29 MMOL/L (ref 21–32)
CREAT SERPL-MCNC: 0.72 MG/DL (ref 0.5–1.05)
EGFRCR SERPLBLD CKD-EPI 2021: 83 ML/MIN/1.73M*2
ERYTHROCYTE [DISTWIDTH] IN BLOOD BY AUTOMATED COUNT: 13 % (ref 11.5–14.5)
GLUCOSE SERPL-MCNC: 102 MG/DL (ref 74–99)
HCT VFR BLD AUTO: 37.4 % (ref 36–46)
HGB BLD-MCNC: 11.7 G/DL (ref 12–16)
MCH RBC QN AUTO: 29.6 PG (ref 26–34)
MCHC RBC AUTO-ENTMCNC: 31.3 G/DL (ref 32–36)
MCV RBC AUTO: 95 FL (ref 80–100)
NRBC BLD-RTO: 0 /100 WBCS (ref 0–0)
PHOSPHATE SERPL-MCNC: 3.6 MG/DL (ref 2.5–4.9)
PLATELET # BLD AUTO: 410 X10*3/UL (ref 150–450)
POTASSIUM SERPL-SCNC: 5 MMOL/L (ref 3.5–5.3)
RBC # BLD AUTO: 3.95 X10*6/UL (ref 4–5.2)
SODIUM SERPL-SCNC: 137 MMOL/L (ref 136–145)
WBC # BLD AUTO: 8.4 X10*3/UL (ref 4.4–11.3)

## 2024-02-26 PROCEDURE — 2500000004 HC RX 250 GENERAL PHARMACY W/ HCPCS (ALT 636 FOR OP/ED)

## 2024-02-26 PROCEDURE — 84100 ASSAY OF PHOSPHORUS: CPT | Performed by: NURSE PRACTITIONER

## 2024-02-26 PROCEDURE — 85027 COMPLETE CBC AUTOMATED: CPT | Performed by: NURSE PRACTITIONER

## 2024-02-26 PROCEDURE — 2500000001 HC RX 250 WO HCPCS SELF ADMINISTERED DRUGS (ALT 637 FOR MEDICARE OP)

## 2024-02-26 PROCEDURE — 97530 THERAPEUTIC ACTIVITIES: CPT | Mod: GO

## 2024-02-26 PROCEDURE — 2500000002 HC RX 250 W HCPCS SELF ADMINISTERED DRUGS (ALT 637 FOR MEDICARE OP, ALT 636 FOR OP/ED)

## 2024-02-26 PROCEDURE — 2500000001 HC RX 250 WO HCPCS SELF ADMINISTERED DRUGS (ALT 637 FOR MEDICARE OP): Performed by: NURSE PRACTITIONER

## 2024-02-26 PROCEDURE — 97530 THERAPEUTIC ACTIVITIES: CPT | Mod: GP,CQ

## 2024-02-26 PROCEDURE — 99232 SBSQ HOSP IP/OBS MODERATE 35: CPT | Performed by: SURGERY

## 2024-02-26 PROCEDURE — 1100000001 HC PRIVATE ROOM DAILY

## 2024-02-26 PROCEDURE — 36415 COLL VENOUS BLD VENIPUNCTURE: CPT | Performed by: NURSE PRACTITIONER

## 2024-02-26 RX ORDER — POLYETHYLENE GLYCOL 3350 17 G/17G
17 POWDER, FOR SOLUTION ORAL DAILY
Status: DISCONTINUED | OUTPATIENT
Start: 2024-02-26 | End: 2024-02-27 | Stop reason: HOSPADM

## 2024-02-26 RX ORDER — NAPROXEN SODIUM 220 MG/1
81 TABLET, FILM COATED ORAL DAILY
Status: DISCONTINUED | OUTPATIENT
Start: 2024-02-26 | End: 2024-02-26

## 2024-02-26 RX ADMIN — AMLODIPINE BESYLATE 2.5 MG: 5 TABLET ORAL at 10:09

## 2024-02-26 RX ADMIN — SERTRALINE HYDROCHLORIDE 50 MG: 50 TABLET ORAL at 10:09

## 2024-02-26 RX ADMIN — SENNOSIDES AND DOCUSATE SODIUM 1 TABLET: 8.6; 5 TABLET ORAL at 20:20

## 2024-02-26 RX ADMIN — LEVETIRACETAM 500 MG: 500 TABLET, FILM COATED ORAL at 10:09

## 2024-02-26 RX ADMIN — ENOXAPARIN SODIUM 40 MG: 100 INJECTION SUBCUTANEOUS at 10:08

## 2024-02-26 RX ADMIN — POLYETHYLENE GLYCOL 3350 17 G: 17 POWDER, FOR SOLUTION ORAL at 12:34

## 2024-02-26 RX ADMIN — Medication 250 MCG: at 10:09

## 2024-02-26 RX ADMIN — LEVETIRACETAM 500 MG: 500 TABLET, FILM COATED ORAL at 20:20

## 2024-02-26 ASSESSMENT — COGNITIVE AND FUNCTIONAL STATUS - GENERAL
MOVING FROM LYING ON BACK TO SITTING ON SIDE OF FLAT BED WITH BEDRAILS: A LITTLE
WALKING IN HOSPITAL ROOM: A LOT
CLIMB 3 TO 5 STEPS WITH RAILING: TOTAL
MOVING TO AND FROM BED TO CHAIR: A LITTLE
WALKING IN HOSPITAL ROOM: A LOT
MOBILITY SCORE: 14
HELP NEEDED FOR BATHING: TOTAL
STANDING UP FROM CHAIR USING ARMS: A LITTLE
STANDING UP FROM CHAIR USING ARMS: A LITTLE
TURNING FROM BACK TO SIDE WHILE IN FLAT BAD: A LOT
CLIMB 3 TO 5 STEPS WITH RAILING: TOTAL
PERSONAL GROOMING: TOTAL
MOBILITY SCORE: 14
MOVING TO AND FROM BED TO CHAIR: A LITTLE
EATING MEALS: TOTAL
DAILY ACTIVITIY SCORE: 6
DRESSING REGULAR UPPER BODY CLOTHING: TOTAL
MOVING FROM LYING ON BACK TO SITTING ON SIDE OF FLAT BED WITH BEDRAILS: A LITTLE
TOILETING: TOTAL
TURNING FROM BACK TO SIDE WHILE IN FLAT BAD: A LOT
DRESSING REGULAR LOWER BODY CLOTHING: TOTAL

## 2024-02-26 ASSESSMENT — PAIN - FUNCTIONAL ASSESSMENT
PAIN_FUNCTIONAL_ASSESSMENT: 0-10
PAIN_FUNCTIONAL_ASSESSMENT: 0-10

## 2024-02-26 ASSESSMENT — PAIN SCALES - GENERAL
PAINLEVEL_OUTOF10: 0 - NO PAIN
PAINLEVEL_OUTOF10: 0 - NO PAIN

## 2024-02-26 NOTE — CARE PLAN
The patient's goals for the shift include      The clinical goals for the shift include pt to remain free of falls      Problem: Safety  Goal: Patient will be injury free during hospitalization  Outcome: Progressing     Problem: Daily Care  Goal: Daily care needs are met  Outcome: Progressing     Problem: Psychosocial Needs  Goal: Demonstrates ability to cope with hospitalization/illness  Outcome: Progressing

## 2024-02-26 NOTE — PROGRESS NOTES
..[facility]  TRAUMA SERVICE - PROGRESS NOTE    Patient Name: Olesya Rodriguez  MRN: [id number]  Admit Date: 219  : 1940  AGE: 83 y.o.   GENDER: female  ==============================================================================  MECHANISM OF INJURY / CHIEF COMPLAINT:   84yo female unwitnessed fall    LOC (yes/no?): Unknown  Anticoagulant / Anti-platelet Rx? (for what dx?): ASA Plavix Past CVA (per daughter, patient is not taking)  Referring Facility Name (N/A for scene EMR run): [facility]      INJURIES:   L Distal Radius fx  L ulnar styloid fx  Acute, comminuted burst type compression fracture of the L3 vertebral body with approximately 40% vertebral body height loss.  Acute on chronic right subdural hematoma measuring up to 9 mm in maximal thickness with minimal adjacent mass effect. No midline shift.     OTHER MEDICAL PROBLEMS:  orthostatic hypotension   CVA unknown if residual deficit  Vascular Dementia  Hypertension     INCIDENTAL FINDINGS:  Cystic Pancreatic lesion     ==============================================================================  TODAY'S ASSESSMENT AND PLAN OF CARE:  Assessment: 83 year old female with left wrist fractures, T3 burst fracture, and acute on chronic subdural hematoma.     PLAN:  ##Acute on Chronic SDH  -Repeat CTH showed increase in SDH from 9mm to 10mm. Next repeat CT showed stability.   -Neurosurgery consult, now signed off with no follow up needed  -Okay for DVT ppx post bleed day 2  -Okay for ASA post bleed day 7 () and PLX post bleed day 10 ()  - Per daughter, Liza, patient has not taken ASA or PLX for an unknown but extended period of time. Daughter is unsure if patient was told to discontinue the medication or if she stopped taking it due to a lapse in insurance that occurred    #dementia  - Geriatrics following  - will follow up with Geriatrics risk vs benefits of DAPT   - per Geriatrics - does not recommend restarting  DAPT  as bleeding outweighs stroke concerns (stroke in 2019)     ##Orthostatic Hypotension  -home amlodipine decreased to 2.5mg  -given small fluid bolus yesterday. Denies dizziness today    - Margo consult>medication changes/labs obtained       ->2/23 started Vitamin D supplementation 50,000 units/week for 8 weeks, then 2,000 units daily        ->2/23 started B12 supplementation    ##Burst Fx L3  -ortho surg consulted>  -upright images reviewed by ortho  -no acute intervention  -avoid extreme twisting/bending  -no weightbearing instructions    ##L Distal Radius fx, known R radius fx (12/2023)  ##L ulnar styloid fx  -splinted by orthopedic surgery  -no operative intervention  -follow up outpt   -short arm splint RUE from 12/2023, STS LUE from acute fracture, NWB BL upper extremities      #Post acute pain  - No longer requiring narcotics, scheduled tylenol changed to PRN   - PT/OT rec'd moderate intensity (SNF), referrals sent     ##FEN/GI/  - regular diet  - BR with Sophy-Colace and Miralax  - Monitor electrolytes and replete as clinically indicated  - Voiding spontaneously   - Strict I/Os  - AM labs prn     ##PPX  -subcutaneous lovenox   - SCDs     Dispo: RNF, medically ready for discharge. Precert initiated at [facility].    Seen and discussed with attending physician, Dr. Morgan.    Katlyn Nova MD  Trauma Surgery  [id number]    ==============================================================================  CHIEF COMPLAINT / OVERNIGHT EVENTS:   KRISTIE, patient laying in bed, appears comfortable. She reports no pain, good appetite and has no complaints today. She denies nausea or dizziness.     MEDICAL HISTORY / ROS:  Admission history and ROS reviewed. Pertinent changes as follows:  none    PHYSICAL EXAM:  Heart Rate:  [70-89]   Temp:  [35.8 °C (96.4 °F)-36.6 °C (97.9 °F)]   Resp:  [14-16]   BP: (136-190)/(66-83)   SpO2:  [96 %-98 %]     Constitutional:       General: She is not in acute distress.      Appearance: She is not ill-appearing.   HENT:      Head:      Comments: evolving L frontal hematoma, improving     Right Ear: External ear normal.      Left Ear: External ear normal.      Nose: Nose normal.      Mouth/Throat:      Mouth: Mucous membranes are moist.      Pharynx: Oropharynx is clear.   Eyes:      General:         Right eye: No discharge.         Left eye: No discharge.      Extraocular Movements: Extraocular movements intact.      Pupils: Pupils are equal, round, and reactive to light.   Cardiovascular:      Rate and Rhythm: Normal rate and regular rhythm.      Pulses: Normal pulses.      Heart sounds: Normal heart sounds.   Pulmonary:      Effort: Pulmonary effort is normal.      Breath sounds: Normal breath sounds.   Abdominal:      General: Abdomen is flat.      Palpations: Abdomen is soft.      Tenderness: There is no abdominal tenderness. There is no guarding or rebound.   Musculoskeletal:      Cervical back: Neck supple. No rigidity.      Comments: No tenderness to palpation L wrist. RUE in splint, LUE in sling   Skin:     Capillary Refill: Capillary refill takes less than 2 seconds.   Neurological:      General: No focal deficit present.      Mental Status: She is alert. Mental status is at baseline.    Cranial Nerves: No cranial nerve deficit.      Sensory: No sensory deficit.      Motor: No weakness.   Psychiatric:         Mood and Affect: Mood normal.         Behavior: Behavior normal.     IMAGING SUMMARY:  (summary of new imaging findings, not a copy of dictation)  none    LABS:  Results from last 7 days   Lab Units 02/22/24  0604 02/21/24  0643 02/20/24  0459   WBC AUTO x10*3/uL 7.2 8.2 9.0   HEMOGLOBIN g/dL 11.9* 14.1 12.1   HEMATOCRIT % 38.2 42.8 36.1   PLATELETS AUTO x10*3/uL 405 469* 410   NEUTROS PCT AUTO %  --   --  68.3   LYMPHS PCT AUTO %  --   --  14.5   MONOS PCT AUTO %  --   --  8.8   EOS PCT AUTO %  --   --  7.4     Results from last 7 days   Lab Units 02/20/24  0459   APTT  seconds 25*   INR  1.0     Results from last 7 days   Lab Units 02/22/24  0604 02/21/24  0643 02/20/24  0459   SODIUM mmol/L 137 136 137   POTASSIUM mmol/L 4.1 4.8 4.3   CHLORIDE mmol/L 100 97* 98   CO2 mmol/L 27 28 30   BUN mg/dL 23 17 19   CREATININE mg/dL 0.95 0.82 0.82   CALCIUM mg/dL 9.1 10.0 9.2   PROTEIN TOTAL g/dL  --   --  6.6   BILIRUBIN TOTAL mg/dL  --   --  0.4   ALK PHOS U/L  --   --  85   ALT U/L  --   --  7   AST U/L  --   --  15   GLUCOSE mg/dL 68* 80 80     Results from last 7 days   Lab Units 02/20/24  0459   BILIRUBIN TOTAL mg/dL 0.4   Katlyn Nova MD

## 2024-02-26 NOTE — CARE PLAN
The patient's goals for the shift include  Remain free from falls.     The clinical goals for the shift include pt remains free from falls throughout shift

## 2024-02-26 NOTE — PROGRESS NOTES
Physical Therapy    Physical Therapy Treatment    Patient Name: Olesya Rodriguez  MRN: [id number]  Today's Date: 2024  Time Calculation  Start Time: 858  Stop Time: 914  Time Calculation (min): 16 min       Assessment/Plan   PT Assessment  PT Assessment Results: Decreased strength, Decreased endurance, Impaired balance, Decreased cognition, Impaired judgement, Decreased safety awareness  Rehab Prognosis: Good  Medical Staff Made Aware: Yes  End of Session Communication: Bedside nurse  End of Session Patient Position: Bed, 3 rail up, Alarm on     PT Plan  Treatment/Interventions: Bed mobility, Transfer training, Gait training, Stair training, Balance training, Neuromuscular re-education, Strengthening, Range of motion, Endurance training, Therapeutic exercise, Therapeutic activity  PT Plan: Skilled PT  PT Frequency: 3 times per week  PT Discharge Recommendations: Moderate intensity level of continued care  PT Recommended Transfer Status: Assist x2  PT - OK to Discharge: Yes (indicates PT eval complete and dc rec determined)      General Visit Information:   PT  Visit  PT Received On: 24  Response to Previous Treatment: Patient with no complaints from previous session.  General  Co-Treatment: OT  Co-Treatment Reason: maximize Pt safety and therapeutic success; Pt with multiple WB precautions  Prior to Session Communication: Bedside nurse  Patient Position Received: Bed, 3 rail up, Alarm on  Preferred Learning Style: verbal  General Comment: pleasantly confused and cooperative, agreeable to therapy session    Subjective   Precautions:  Precautions  UE Weight Bearing Status: Right Non-Weight Bearing, Left Non-Weight Bearing  Precautions Comment: sling donned L UE while sitting EOB  Vital Signs:  Vital Signs  Heart Rate:  (Supine: 94bpm, Sittinbpm, Standing 81bpm)  Heart Rate Source: Monitor  SpO2: 96 % (sitting EOB)  BP:  (Supine: 157/73, Sittin/71, Standin/39, Sitting EOB post stand: 98/58)  BP  Location: Left arm  BP Method: Automatic    Objective   Pain:  Pain Assessment  Pain Assessment: 0-10  Pain Score: 0 - No pain  Cognition:  Cognition  Overall Cognitive Status: Impaired at baseline  Orientation Level: Disoriented to place, Disoriented to situation  Postural Control:  Static Sitting Balance  Static Sitting-Balance Support: Feet supported, No upper extremity supported  Static Sitting-Level of Assistance: Close supervision  Static Standing Balance  Static Standing-Balance Support: No upper extremity supported  Static Standing-Level of Assistance: Minimum assistance  Static Standing-Comment/Number of Minutes: Tolerated static stance at bedside for :30 sec with Min A x 1 while obtaining BP  Treatments:       Bed Mobility  Bed Mobility: Yes  Bed Mobility 1  Bed Mobility 1: Supine to sitting, Sitting to supine  Level of Assistance 1: Moderate assistance  Bed Mobility Comments 1: HOB elevated    Ambulation/Gait Training  Ambulation/Gait Training Performed: Yes  Ambulation/Gait Training 1  Surface 1: Level tile  Device 1: No device  Assistance 1: Minimum assistance  Quality of Gait 1: Narrow base of support, Shuffling gait  Comments/Distance (ft) 1: 2 steps towards HOB  Transfers  Transfer: Yes  Transfer 1  Transfer From 1: Sit to  Transfer to 1: Stand  Technique 1: Sit to stand  Transfer Level of Assistance 1: Minimum assistance, Minimal verbal cues  Trials/Comments 1: x1  Transfers 2  Transfer From 2: Stand to  Transfer to 2: Sit  Technique 2: Stand to sit  Transfer Level of Assistance 2: Minimum assistance, Minimal verbal cues  Trials/Comments 2: x1    Outcome Measures:  [facility] Basic Mobility  Turning from your back to your side while in a flat bed without using bedrails: A little  Moving from lying on your back to sitting on the side of a flat bed without using bedrails: A lot  Moving to and from bed to chair (including a wheelchair): A little  Standing up from a chair using your arms (e.g. wheelchair or  bedside chair): A little  To walk in hospital room: A lot  Climbing 3-5 steps with railing: Total  Basic Mobility - Total Score: 14    Education Documentation  Precautions, taught by Leslie Holguin PTA at 2/26/2024  9:36 AM.  Learner: Patient  Readiness: Eager  Method: Explanation  Response: Needs Reinforcement    Mobility Training, taught by Leslie Holguin PTA at 2/26/2024  9:36 AM.  Learner: Patient  Readiness: Eager  Method: Explanation  Response: Needs Reinforcement    Education Comments  No comments found.        OP EDUCATION:       Encounter Problems       Encounter Problems (Active)       Balance       STG - Maintains static standing balance without upper extremity support for 10 min modif indep  (Progressing)       Start:  02/21/24    Expected End:  03/14/24       INTERVENTIONS:  1. Practice standing with minimal support.  2. Educate patient about maintaining total hip precautions while maintaining balance.  3. Educate patient about standing tolerance.  4. Educate patient about independence with gait, transfers, and ADL's.  5. Educate patient about use of assistive device.  6. Educate patient about self-directed care.            Mobility       STG - Patient will ambulate >20' and progress with SBA, LRAD to maintain NWB BUEs (Progressing)       Start:  02/21/24    Expected End:  03/14/24               Transfers       STG - Patient will perform bed mobility modif indep  (Progressing)       Start:  02/21/24    Expected End:  03/14/24            STG - Patient will transfer sit to and from stand SBA  (Progressing)       Start:  02/21/24    Expected End:  03/14/24

## 2024-02-26 NOTE — PROGRESS NOTES
Occupational Therapy    Occupational Therapy Treatment    Name: Olesya Rodriguez  MRN: [id number]  : 1940  Date: 24  Time Calculation  Start Time: 858  Stop Time: 914  Time Calculation (min): 16 min    Assessment:  OT Assessment: Pt presents with decreased strength, endurance, and balance with impaired cognition and now NWB B UE impairing occupational performance of ADLs and functional mobility  Evaluation/Treatment Tolerance: Treatment limited secondary to medical complications (Comment) (+orthostatic, see vitals section; RN aware)  End of Session Communication: Bedside nurse  End of Session Patient Position: Bed, 3 rail up, Alarm on  Plan:  Treatment Interventions: ADL retraining, Functional transfer training, Cognitive reorientation, Compensatory technique education  OT Frequency: 2 times per week  OT Discharge Recommendations: Moderate intensity level of continued care  OT - OK to Discharge: Yes (indicates completed eval and discharge recommendation)    Subjective   General:  OT Last Visit  OT Received On: 24  Reason for Referral: s/p fall with injuries1. L Distal Radius fx  2. L ulnar styloid fx  3. Acute, comminuted burst type compression fracture of the L3 vertebral body with approximately 40% vertebral body height loss. 4. Acute on chronic right subdural hematoma measuring up to 9 mm in maximal thickness with minimal adjacent mass effect. No midline shift.  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, Syncope 11/15/2011, Vascular dementia of acute onset without behavioral disturbance (CMS/[facility])  Co-Treatment: PT  Co-Treatment Reason: maximize Pt safety and therapeutic success; Pt with multiple WB precautions  Prior to Session Communication: Bedside nurse  Patient Position Received: Bed, 3 rail up, Alarm on  General Comment: pleasantly confused and cooperative, agreeable to OT   Precautions:  UE Weight Bearing Status:  Right Non-Weight Bearing, Left Non-Weight Bearing  Medical Precautions: Fall precautions, Spinal precautions  Precautions Comment: sling donned L UE  Vitals:  Vital Signs  BP:  (supine: 157/73BP 94HR sittin/71BP 85HR standin/39BP 81HR sittin/58BP 91HR)  Lines/Tubes/Drains:  External Urinary Catheter (Active)   Number of days: 2       Cognition:  Overall Cognitive Status: Impaired at baseline  Orientation Level: Disoriented to place, Disoriented to situation    Pain Assessment:  Pain Assessment  Pain Assessment: 0-10  Pain Score: 0 - No pain     Objective   Activities of Daily Living:    Dependent maya B socks supine in bed HOB elevated     Bed Mobility/Transfers:   Bed Mobility  Bed Mobility: Yes  Bed Mobility 1  Bed Mobility 1: Supine to sitting, Sitting to supine  Level of Assistance 1: Moderate assistance  Bed Mobility Comments 1: HOB elevated; assist with trunk    Transfers  Transfer: Yes  Transfer 1  Transfer From 1: Bed to  Transfer to 1: Stand  Technique 1: Sit to stand  Transfer Level of Assistance 1: Arm in arm assistance, Minimum assistance, Minimal verbal cues  Trials/Comments 1: 1x  Transfers 2  Transfer From 2: Stand to  Transfer to 2: Bed  Technique 2: Stand to sit  Transfer Level of Assistance 2: Minimum assistance, Minimal verbal cues  Trials/Comments 2: 1x       Therapy/Activity:      Therapeutic Activity  Therapeutic Activity Performed: Yes  Therapeutic Activity 1: Pt sat EOB SBA-CGA for postural control. Pt required min A for STS. Pt require min A for static standing    Outcome Measures:  [facility] Daily Activity  Putting on and taking off regular lower body clothing: Total  Bathing (including washing, rinsing, drying): Total  Putting on and taking off regular upper body clothing: Total  Toileting, which includes using toilet, bedpan or urinal: Total  Taking care of personal grooming such as brushing teeth: Total  Eating Meals: Total  Daily Activity - Total Score: 6     Education  Documentation  Precautions, taught by Paola Lovelace OT at 2/26/2024 10:57 AM.  Learner: Patient  Readiness: Acceptance  Method: Explanation  Response: Needs Reinforcement    ADL Training, taught by Paola Lovelace OT at 2/26/2024 10:57 AM.  Learner: Patient  Readiness: Acceptance  Method: Explanation  Response: Needs Reinforcement    Goals:  Encounter Problems       Encounter Problems (Active)       BALANCE       Pt will maintain dynamic standing balance during ADL task with minimal assist  level of assistance in order to demonstrate decreased risk of falling and improved postural control. (Progressing)       Start:  02/21/24    Expected End:  03/06/24                 COGNITION/SAFETY       Patient will recall and adhere to weight bearing restrictions with all ADL and functional mobility in order to promote healing and safety with functional tasks (Progressing)       Start:  02/21/24    Expected End:  03/06/24            Patient will follow 100% Simple commands to allow improved ADL performance. (Met)       Start:  02/21/24    Expected End:  03/06/24    Resolved:  02/26/24            MOBILITY       Patient will perform Functional mobility min Household distances/Community Distances with minimal assist  level of assistance and least restrictive device in order to improve safety and functional mobility. (Progressing)       Start:  02/21/24    Expected End:  03/06/24                 TRANSFERS       Patient will perform bed mobility minimal assist  level of assistance in order to improve safety and independence with mobility (Progressing)       Start:  02/21/24    Expected End:  03/06/24            Patient will complete functional transfers with least restrictive device with minimal assist  level of assistance. (Met)       Start:  02/21/24    Expected End:  03/06/24    Resolved:  02/26/24 02/26/24 at 10:58 AM   Paola Lovelace OT   [phone]

## 2024-02-26 NOTE — DISCHARGE INSTRUCTIONS
[facility]  DISCHARGE INSTRUCTIONS    You were admitted to [facility] from 2/19/24 - 2/27/24 for an unwitnessed fall.     You sustained the following injuries:   - L Distal Radius fx  - L ulnar styloid fx  - Acute on chronic R SDH  - L3 Vertebral Body fx      You did not require surgery during this hospital stay.    GENERAL INSTRUCTIONS  1) Diet: You may resume your regular home diet     2) Activity:   - Move around as you are able  -Avoid strenuous activities including no heavy lifting/twisting/bending >10lbs    3) Medications:   - Take tylenol 650 mg every 6 hours as needed for your pain. You can alternate this every 3 hours with Ibuprofen (example: take tylenol at 6 am, noon, 6 pm and take ibuprofen at 9 am, 3 pm, 9 pm). Do not take both ibuprofen and toradol .    4) Return precautions -- Call the on call numbers listed for Trauma surgery below if you have any of the following symptoms:  - Fever > 100.5 F (>38 C), chills  -uncontrolled nausea and/or vomiting  -Chest pain  -new or worsening shortness of breathe  -uncontrolled diarrhea   -you stop passing gas   -have new bruising, rashes, blistering on your body  -new numbness/tingling, loss of feeling in any part of your body  -new or worsening swelling  -uncontrolled pain    5) Incentive spirometer use: please try to use the incentive spirometer every hour that you are awake.  A good idea is to use this device 10-15 times each hour (if the TV is on, 5-6 times per commercial will help you meet this goal). Your goal should be to reach 2000 on the spirometer over the week following your discharge.    ADDITIONAL INSTRUCTIONS    FOLLOW-UP APPOINTMENTS:  Orthopedic Surgery Clinic (Dr. Woody)  You will follow-up with this clinic in two weeks after discharge. If you do not hear from them within 1 week of discharge with appointment information, please call [phone] to schedule your  appointment.    Orthopedic Surgery Clinic (Dr. Jackson)  You will follow-up with this clinic in two after discharge. If you do not hear from them within 1 week of discharge with appointment information, please call [phone] to schedule your appointment.    Trauma Surgery Team  Follow up as needed. Please call the trauma clinic at [phone] for any questions concerning your recent admission and/or surgery. Also, do not hesitate to call our outpatient nurse coordinator at [phone] with any questions/concerns. The nurse will get back to you within 48-72 hours. If you feel it is an emergency, please proceed to your nearest Emergency Room.

## 2024-02-26 NOTE — CARE PLAN
The patient's goals for the shift include  rest    The clinical goals for the shift include pt remains free from falls throughout shift

## 2024-02-27 VITALS
DIASTOLIC BLOOD PRESSURE: 70 MMHG | OXYGEN SATURATION: 96 % | TEMPERATURE: 97.3 F | RESPIRATION RATE: 18 BRPM | SYSTOLIC BLOOD PRESSURE: 156 MMHG | HEART RATE: 79 BPM

## 2024-02-27 PROCEDURE — 2500000004 HC RX 250 GENERAL PHARMACY W/ HCPCS (ALT 636 FOR OP/ED)

## 2024-02-27 PROCEDURE — 2500000004 HC RX 250 GENERAL PHARMACY W/ HCPCS (ALT 636 FOR OP/ED): Performed by: PHYSICIAN ASSISTANT

## 2024-02-27 PROCEDURE — 2500000002 HC RX 250 W HCPCS SELF ADMINISTERED DRUGS (ALT 637 FOR MEDICARE OP, ALT 636 FOR OP/ED)

## 2024-02-27 PROCEDURE — 99238 HOSP IP/OBS DSCHRG MGMT 30/<: CPT | Performed by: SURGERY

## 2024-02-27 PROCEDURE — 2500000001 HC RX 250 WO HCPCS SELF ADMINISTERED DRUGS (ALT 637 FOR MEDICARE OP)

## 2024-02-27 PROCEDURE — 2500000001 HC RX 250 WO HCPCS SELF ADMINISTERED DRUGS (ALT 637 FOR MEDICARE OP): Performed by: NURSE PRACTITIONER

## 2024-02-27 RX ORDER — ACETAMINOPHEN 325 MG/1
975 TABLET ORAL EVERY 6 HOURS PRN
Qty: 100 TABLET | Refills: 0
Start: 2024-02-27 | End: 2024-03-13

## 2024-02-27 RX ORDER — ERGOCALCIFEROL 1.25 MG/1
1250 CAPSULE ORAL
Qty: 4 CAPSULE | Refills: 1
Start: 2024-03-02 | End: 2024-04-14

## 2024-02-27 RX ORDER — CYANOCOBALAMIN (VITAMIN B-12) 250 MCG
250 TABLET ORAL DAILY
Qty: 30 TABLET | Refills: 0
Start: 2024-02-27

## 2024-02-27 RX ORDER — AMLODIPINE BESYLATE 2.5 MG/1
2.5 TABLET ORAL DAILY
Qty: 30 TABLET | Refills: 0
Start: 2024-02-27

## 2024-02-27 RX ORDER — LEVETIRACETAM 500 MG/1
500 TABLET ORAL 2 TIMES DAILY
Qty: 2 TABLET | Refills: 0
Start: 2024-02-27 | End: 2024-02-28

## 2024-02-27 RX ORDER — CHOLECALCIFEROL (VITAMIN D3) 50 MCG
2000 TABLET ORAL DAILY
Qty: 30 TABLET
Start: 2024-04-27

## 2024-02-27 RX ADMIN — SERTRALINE HYDROCHLORIDE 50 MG: 50 TABLET ORAL at 07:44

## 2024-02-27 RX ADMIN — LEVETIRACETAM 500 MG: 500 TABLET, FILM COATED ORAL at 07:44

## 2024-02-27 RX ADMIN — ENOXAPARIN SODIUM 40 MG: 100 INJECTION SUBCUTANEOUS at 07:45

## 2024-02-27 RX ADMIN — SODIUM CHLORIDE 500 ML: 9 INJECTION, SOLUTION INTRAVENOUS at 08:40

## 2024-02-27 RX ADMIN — AMLODIPINE BESYLATE 2.5 MG: 5 TABLET ORAL at 07:44

## 2024-02-27 RX ADMIN — POLYETHYLENE GLYCOL 3350 17 G: 17 POWDER, FOR SOLUTION ORAL at 07:44

## 2024-02-27 RX ADMIN — Medication 250 MCG: at 07:44

## 2024-02-27 ASSESSMENT — COGNITIVE AND FUNCTIONAL STATUS - GENERAL
MOBILITY SCORE: 14
MOVING TO AND FROM BED TO CHAIR: A LITTLE
CLIMB 3 TO 5 STEPS WITH RAILING: TOTAL
STANDING UP FROM CHAIR USING ARMS: A LITTLE
TURNING FROM BACK TO SIDE WHILE IN FLAT BAD: A LOT
WALKING IN HOSPITAL ROOM: A LOT
MOVING FROM LYING ON BACK TO SITTING ON SIDE OF FLAT BED WITH BEDRAILS: A LITTLE

## 2024-02-27 ASSESSMENT — PAIN SCALES - GENERAL
PAINLEVEL_OUTOF10: 0 - NO PAIN
PAINLEVEL_OUTOF10: 1
PAINLEVEL_OUTOF10: 0 - NO PAIN

## 2024-02-27 ASSESSMENT — PAIN - FUNCTIONAL ASSESSMENT
PAIN_FUNCTIONAL_ASSESSMENT: 0-10

## 2024-02-27 NOTE — CARE PLAN
Problem: Pain  Goal: My pain/discomfort is manageable  Outcome: Progressing     Problem: Safety  Goal: Patient will be injury free during hospitalization  Outcome: Progressing  Goal: I will remain free of falls  Outcome: Progressing     Problem: Daily Care  Goal: Daily care needs are met  Outcome: Progressing     Problem: Discharge Barriers  Goal: My discharge needs are met  Outcome: Progressing     Problem: Fall/Injury  Goal: Not fall by end of shift  2/27/2024 0930 by Ariadne Christian RN  Outcome: Progressing  2/27/2024 0927 by Ariadne Christian RN  Outcome: Progressing  Goal: Be free from injury by end of the shift  2/27/2024 0930 by Ariadne Christian RN  Outcome: Progressing  2/27/2024 0927 by Ariadne Christian RN  Outcome: Progressing  Goal: Use assistive devices by end of the shift  2/27/2024 0930 by Ariadne Christian RN  Outcome: Progressing  2/27/2024 0927 by Ariadne Christian RN  Outcome: Progressing     Problem: Skin  Goal: Decreased wound size/increased tissue granulation at next dressing change  Outcome: Progressing  Goal: Prevent/manage excess moisture  Outcome: Progressing  Goal: Prevent/minimize sheer/friction injuries  Outcome: Progressing  Goal: Promote skin healing  Outcome: Progressing   The patient's goals for the shift include

## 2024-02-27 NOTE — DISCHARGE SUMMARY
Discharge Diagnosis  Subdural hematoma (CMS/HCC)    Issues Requiring Follow-Up  - follow up with orthopedic surgery (spine)  - follow up with orthopedic surgery (LUE)    Test Results Pending At Discharge  Pending Labs       No current pending labs.            Hospital Course  Olesya delgado is a 82 Y/O Female w/ hx of CVA on ASA/Plavix, Anxiety, Orthostatic hypotension, and Dementia who presented to [facility] ED after being transferred from OSH s/p unwitnessed fall.  Imaging notable for L distal radius/ulna fx, L3 burst fx, and acute on chronic SDH.  Initial rCTH worsened but then stable on 3rd CTH.  NSGY consulted.  Once imaging stabilized, recommended DVT ppx on PBD #2, holding ASA until PBD #7, and holding plavix until PBD #10.  Ortho consulted for spine fx and recommended upright XR and no bending/twisting.  Ortho hand consulted and recommended non-op mgmt and splint application.  Patient admitted to the [facility] for continued monitoring and PT/OT. Pt resumed on home medications, Geriatric were consulted and have recommended Orthostatic VS, Vit-d/TSH/B12 serum studies. Pt was found to be orthostatic positive 2/21 and home amlodipine was decreased. PT was due to start home ASA and Plavix, however pt is not taking home dapt, was originally started for remote cva. After discussion with attending physician and geriatric service decision was made to discontinue these as the patient is not taking them.    On the morning of discharge, patient and family were agreeable with plan to discharge to SNF. Tolerating a diet and at baseline activity. Incidental findings were discussed. All questions answered.      Pertinent Physical Exam At Time of Discharge  Constitutional:       General: She is not in acute distress.     Appearance: She is not ill-appearing.   HENT:      Head:      Comments: evolving L frontal hematoma, improving     Right Ear: External ear normal.      Left Ear: External ear normal.      Nose: Nose normal.       Mouth/Throat:      Mouth: Mucous membranes are moist.      Pharynx: Oropharynx is clear.   Eyes:      General:         Right eye: No discharge.         Left eye: No discharge.      Extraocular Movements: Extraocular movements intact.      Pupils: Pupils are equal, round, and reactive to light.   Cardiovascular:      Rate and Rhythm: Normal rate and regular rhythm.      Pulses: Normal pulses.      Heart sounds: Normal heart sounds.   Pulmonary:      Effort: Pulmonary effort is normal.      Breath sounds: Normal breath sounds.   Abdominal:      General: Abdomen is flat.      Palpations: Abdomen is soft.      Tenderness: There is no abdominal tenderness. There is no guarding or rebound.   Musculoskeletal:      Cervical back: Neck supple. No rigidity.      Comments: No tenderness to palpation L wrist. RUE in splint, LUE in sling   Skin:     Capillary Refill: Capillary refill takes less than 2 seconds.   Neurological:      General: No focal deficit present.      Mental Status: She is alert. Mental status is at baseline.    Cranial Nerves: No cranial nerve deficit.      Sensory: No sensory deficit.      Motor: No weakness.   Psychiatric:         Mood and Affect: Mood normal.         Behavior: Behavior normal.     Home Medications     Medication List      START taking these medications     acetaminophen 325 mg tablet; Commonly known as: Tylenol; Take 3 tablets   (975 mg) by mouth every 6 hours if needed (pain 4-10) for up to 15 days.   cholecalciferol 50 MCG (2000 UT) tablet; Commonly known as: Vitamin D-3;   Take 1 tablet (2,000 Units) by mouth once daily. Do not start before April 27, 2024.; Start taking on: April 27, 2024   cyanocobalamin 250 mcg tablet; Commonly known as: Vitamin B-12; Take 1   tablet (250 mcg) by mouth once daily.   ergocalciferol 1.25 MG (10652 UT) capsule; Commonly known as: Vitamin   D-2; Take 1 capsule (1,250 mcg) by mouth 1 (one) time per week for 7   doses. Do not start before March 2, 2024.;  Start taking on: March 2, 2024   levETIRAcetam 500 mg tablet; Commonly known as: Keppra; Take 1 tablet   (500 mg) by mouth 2 times a day for 1 day.     CHANGE how you take these medications     amLODIPine 2.5 mg tablet; Commonly known as: Norvasc; Take 1 tablet (2.5   mg) by mouth once daily.; What changed: medication strength, how much to   take     CONTINUE taking these medications     EPINEPHrine 0.3 mg/0.3 mL injection syringe; Commonly known as: Epipen   sertraline 50 mg tablet; Commonly known as: Zoloft       Outpatient Follow-Up  No future appointments.    Katlyn Nova MD

## 2024-02-27 NOTE — CARE PLAN
Problem: Pain  Goal: My pain/discomfort is manageable  Outcome: Progressing     Problem: Safety  Goal: Patient will be injury free during hospitalization  Outcome: Progressing  Goal: I will remain free of falls  Outcome: Progressing     Problem: Daily Care  Goal: Daily care needs are met  Outcome: Progressing     Problem: Discharge Barriers  Goal: My discharge needs are met  Outcome: Progressing     Problem: Fall/Injury  Goal: Not fall by end of shift  Outcome: Progressing  Goal: Be free from injury by end of the shift  Outcome: Progressing  Goal: Use assistive devices by end of the shift  Outcome: Progressing     Problem: Skin  Goal: Decreased wound size/increased tissue granulation at next dressing change  Outcome: Progressing

## 2024-02-27 NOTE — CARE PLAN
Patient was calm and cooperative with care, no complaints of pain, pt is oriented to self only, bed alarm active and audible. The patient's goals for the shift include  pt is unable to determine goals for the shift with her current state of only being oriented to self.   Problem: Skin  Goal: Prevent/minimize sheer/friction injuries  Flowsheets (Taken 2/27/2024 0235)  Prevent/minimize sheer/friction injuries:   HOB 30 degrees or less   Use pull sheet       The clinical goals for the shift include pt to remain free from falls or injuries throughout the shift    Over the shift, the patient did make progress toward the following goals.

## 2024-02-27 NOTE — PROGRESS NOTES
Patient is medically cleared for discharge today she will discharge to [facility] today via ambulance stretcher no oxygen transport confirmed for 12 noon. Patient's daughter [phone] is aware. I will follow until discharged.

## 2024-02-27 NOTE — PROGRESS NOTES
[facility] requested SW to arrange 12noon stretcher . Pending Roundtrip confirmation.     [facility] confirmed 12noon . SW updated [facility], SNF, the floor and left vm for daughter Liza.    KARTHIK Jovel

## 2024-03-18 NOTE — ED PROVIDER NOTES
[address] [facility] ED  eMERGENCY dEPARTMENT eNCOUnter      Pt Name: Paramjit Akbar  MRN: [id number]  [place] 1940  Date of evaluation: 4/23/2021  Provider: Shirley Barker Dr       Chief Complaint   Patient presents with    Other     Pain in legs, back and shoulders x 2 days. HISTORY OF PRESENT ILLNESS   (Location/Symptom, Timing/Onset,Context/Setting, Quality, Duration, Modifying Factors, Severity)  Note limiting factors. Paramjit Akbar is a 79 y.o. female who presents to the emergency department [facility] with pruritus back legs arms which started several days ago after starting mirtazapine she also notes that she has had some spasms in her legs patient denies fever chills nausea vomiting. Family at bedside answers questions for patient where applicable. Consulted pharmacy on mirtazapine these are known side effects of mirtazapine. Symptoms mild severity nothing improves or worsen symptoms. HPI    NursingNotes were reviewed. REVIEW OF SYSTEMS    (2-9 systems for level 4, 10 or more for level 5)     Review of Systems   Constitutional: Negative for activity change, appetite change and unexpected weight change. HENT: Negative for ear discharge, nosebleeds and voice change. Eyes: Negative for photophobia and discharge. Respiratory: Negative for apnea and cough. Cardiovascular: Negative for chest pain, palpitations and leg swelling. Gastrointestinal: Negative for abdominal distention, anal bleeding, nausea and vomiting. Endocrine: Negative for cold intolerance, heat intolerance and polyphagia. Genitourinary: Negative for genital sores. Musculoskeletal: Negative for joint swelling. Skin: Positive for rash. Negative for pallor. Allergic/Immunologic: Negative for immunocompromised state. Neurological: Negative for seizures and facial asymmetry. Hematological: Does not bruise/bleed easily.    Psychiatric/Behavioral: Negative for behavioral problems, self-injury and sleep disturbance. All other systems reviewed and are negative. Except as noted above the remainder of the review of systems was reviewed and negative. PAST MEDICAL HISTORY     Past Medical History:   Diagnosis Date    Cerebral artery occlusion with cerebral infarction ([facility]     Dementia ([facility]          SURGICALHISTORY       Past Surgical History:   Procedure Laterality Date    ANKLE FRACTURE SURGERY Left     CHOLECYSTECTOMY      TUBAL LIGATION           CURRENT MEDICATIONS       Previous Medications    LIDOCAINE (XYLOCAINE) 5 % OINTMENT    Apply topically as needed. ALLERGIES     Nuts [peanut-containing drug products] and Shellfish-derived products    FAMILY HISTORY     History reviewed. No pertinent family history. SOCIAL HISTORY       Social History     Socioeconomic History    Marital status:       Spouse name: None    Number of children: None    Years of education: None    Highest education level: None   Occupational History    None   Social Needs    Financial resource strain: None    Food insecurity     Worry: None     Inability: None    Transportation needs     Medical: None     Non-medical: None   Tobacco Use    Smoking status: Former Smoker    Smokeless tobacco: Never Used   Substance and Sexual Activity    Alcohol use: Not Currently    Drug use: Never    Sexual activity: None   Lifestyle    Physical activity     Days per week: None     Minutes per session: None    Stress: None   Relationships    Social connections     Talks on phone: None     Gets together: None     Attends [employer] service: None     Active member of club or organization: None     Attends meetings of clubs or organizations: None     Relationship status: None    Intimate partner violence     Fear of current or ex partner: None     Emotionally abused: None     Physically abused: None     Forced sexual activity: None   Other Topics Concern    None   Social History Narrative  None       SCREENINGS    Ida Coma Scale  Eye Opening: Spontaneous  Best Verbal Response: Oriented  Best Motor Response: Obeys commands  [place] Coma Scale Score: 15 @FLOW([id number])@      PHYSICAL EXAM    (up to 7 for level 4, 8 or more for level 5)     ED Triage Vitals [04/23/21 2335]   BP Temp Temp Source Pulse Resp SpO2 Height Weight   138/78 97.4 °F (36.3 °C) Temporal 73 18 99 % 5' 3\" (1.6 m) 115 lb (52.2 kg)       Physical Exam  Vitals signs and nursing note reviewed. Constitutional:       General: She is not in acute distress. Appearance: She is well-developed. She is not ill-appearing. HENT:      Head: Normocephalic and atraumatic. Right Ear: External ear normal.      Left Ear: External ear normal.      Nose: Nose normal.      Mouth/Throat:      Mouth: Mucous membranes are moist.      Pharynx: No oropharyngeal exudate or posterior oropharyngeal erythema. Eyes:      General:         Right eye: No discharge. Left eye: No discharge. Extraocular Movements: Extraocular movements intact. Pupils: Pupils are equal, round, and reactive to light. Neck:      Musculoskeletal: Normal range of motion and neck supple. Cardiovascular:      Rate and Rhythm: Normal rate and regular rhythm. Heart sounds: Normal heart sounds. Pulmonary:      Effort: Pulmonary effort is normal. No respiratory distress. Breath sounds: Normal breath sounds. No stridor. Abdominal:      General: Bowel sounds are normal. There is no distension. Palpations: Abdomen is soft. Musculoskeletal: Normal range of motion. Right lower leg: No edema. Left lower leg: No edema. Skin:     General: Skin is warm. Findings: Rash present. No erythema. Comments: Diffuse arm/neck and back rash   Neurological:      Mental Status: She is alert and oriented to person, place, and time. Motor: No weakness.    Psychiatric:         Mood and Affect: Mood normal.         DIAGNOSTIC RESULTS EKG: All EKG's are interpreted by the Emergency Department Physician who either signs or Co-signsthis chart in the absence of a cardiologist.         RADIOLOGY:   Miguel Nasir such as CT, Ultrasound and MRI are read by the radiologist. Plain radiographic images are visualized and preliminarily interpreted by the emergency physician with the below findings:         Interpretation per the Radiologist below, if available at the time ofthis note:    No orders to display         ED BEDSIDE ULTRASOUND:   Performed by ED Physician - none    LABS:  Labs Reviewed   COMPREHENSIVE METABOLIC PANEL - Abnormal; Notable for the following components:       Result Value    Anion Gap 6 (*)     CREATININE 0.97 (*)     GFR Non- 55.1 (*)     All other components within normal limits   CBC WITH AUTO DIFFERENTIAL - Abnormal; Notable for the following components:    MCHC 32.8 (*)     RDW 15.0 (*)     Eosinophils Absolute 0.8 (*)     All other components within normal limits   MAGNESIUM       All other labs were within normal range or not returned as of this dictation. EMERGENCY DEPARTMENT COURSE and DIFFERENTIAL DIAGNOSIS/MDM:   Vitals:    Vitals:    04/23/21 2335   BP: 138/78   Pulse: 73   Resp: 18   Temp: 97.4 °F (36.3 °C)   TempSrc: Temporal   SpO2: 99%   Weight: 115 lb (52.2 kg)   Height: 5' 3\" (1.6 m)            MDM  Number of Diagnoses or Management Options  Diagnosis management comments: Pharmacy consulted for mirtazepine . Rash and muscle spasm known side effects. Will hold mirtazepine and start benadryl and prednisone. Amount and/or Complexity of Data Reviewed  Clinical lab tests: reviewed and ordered        CRITICAL CARE TIME         CONSULTS:  None    PROCEDURES:  Unless otherwise noted below, none     Procedures    FINAL IMPRESSION      1.  Dermatitis          DISPOSITION/PLAN   DISPOSITION        PATIENT REFERRED TO:  Your Primary care doctor    Call in 2 days      [facility]) ED  [address] [facility]  [address] [facility] [address]  [phone]    If symptoms worsen      DISCHARGE MEDICATIONS:  New Prescriptions    METHYLPREDNISOLONE (MEDROL, GILL,) 4 MG TABLET    Take by mouth.           (Please note that portions of this note were completed with a voice recognition program.  Efforts were made to edit the dictations but occasionally words are mis-transcribed.)    Meryl Padilla PA-C (electronically signed)  Attending Emergency Physician       Meryl Padilla PA-C  04/24/21 0144 no

## 2024-05-28 ENCOUNTER — APPOINTMENT (OUTPATIENT)
Dept: CT IMAGING | Age: 84
End: 2024-05-28
Payer: MEDICARE

## 2024-05-28 ENCOUNTER — HOSPITAL ENCOUNTER (EMERGENCY)
Age: 84
Discharge: HOME | End: 2024-05-28
Attending: EMERGENCY MEDICINE
Payer: MEDICARE

## 2024-05-28 ENCOUNTER — APPOINTMENT (OUTPATIENT)
Dept: GENERAL RADIOLOGY | Age: 84
End: 2024-05-28
Payer: MEDICARE

## 2024-05-28 VITALS
WEIGHT: 117 LBS | TEMPERATURE: 98 F | HEART RATE: 77 BPM | OXYGEN SATURATION: 98 % | BODY MASS INDEX: 20.08 KG/M2 | SYSTOLIC BLOOD PRESSURE: 174 MMHG | DIASTOLIC BLOOD PRESSURE: 82 MMHG | RESPIRATION RATE: 23 BRPM

## 2024-05-28 DIAGNOSIS — S62.101A RIGHT WRIST FRACTURE, CLOSED, INITIAL ENCOUNTER: ICD-10-CM
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
## 2024-05-28 LAB
ALBUMIN SERPL-MCNC: 3.1 G/DL (ref 3.5–4.6)
ALP SERPL-CCNC: 109 U/L (ref 40–130)
ALT SERPL-CCNC: 7 U/L (ref 0–33)
ANION GAP SERPL CALCULATED.3IONS-SCNC: 10 MEQ/L (ref 9–15)
AST SERPL-CCNC: 11 U/L (ref 0–35)
BACTERIA URNS QL MICRO: NEGATIVE /HPF
BASOPHILS # BLD: 0.1 K/UL (ref 0–0.2)
BASOPHILS NFR BLD: 0.5 %
BILIRUB SERPL-MCNC: 0.4 MG/DL (ref 0.2–0.7)
BILIRUB UR QL STRIP: NEGATIVE
BUN SERPL-MCNC: 11 MG/DL (ref 8–23)
CALCIUM SERPL-MCNC: 7.6 MG/DL (ref 8.5–9.9)
CHLORIDE SERPL-SCNC: 107 MEQ/L (ref 95–107)
CLARITY UR: CLEAR
CO2 SERPL-SCNC: 23 MEQ/L (ref 20–31)
COLOR UR: YELLOW
CREAT SERPL-MCNC: 0.56 MG/DL (ref 0.5–0.9)
EOSINOPHIL # BLD: 0.1 K/UL (ref 0–0.7)
EOSINOPHIL NFR BLD: 0.5 %
EPI CELLS #/AREA URNS AUTO: ABNORMAL /HPF (ref 0–5)
ERYTHROCYTE [DISTWIDTH] IN BLOOD BY AUTOMATED COUNT: 13.9 % (ref 11.5–14.5)
GLOBULIN SER CALC-MCNC: 2.9 G/DL (ref 2.3–3.5)
GLUCOSE SERPL-MCNC: 101 MG/DL (ref 70–99)
GLUCOSE UR STRIP-MCNC: NEGATIVE MG/DL
HCT VFR BLD AUTO: 38.9 % (ref 37–47)
HGB BLD-MCNC: 12.1 G/DL (ref 12–16)
HGB UR QL STRIP: ABNORMAL
HYALINE CASTS #/AREA URNS AUTO: ABNORMAL /HPF (ref 0–5)
KETONES UR STRIP-MCNC: NEGATIVE MG/DL
LEUKOCYTE ESTERASE UR QL STRIP: NEGATIVE
LYMPHOCYTES # BLD: 0.7 K/UL (ref 1–4.8)
LYMPHOCYTES NFR BLD: 5.9 %
MCH RBC QN AUTO: 28.1 PG (ref 27–31.3)
MCHC RBC AUTO-ENTMCNC: 31.1 % (ref 33–37)
MCV RBC AUTO: 90.3 FL (ref 79.4–94.8)
MONOCYTES # BLD: 0.7 K/UL (ref 0.2–0.8)
MONOCYTES NFR BLD: 5.6 %
NEUTROPHILS # BLD: 10.7 K/UL (ref 1.4–6.5)
NEUTS SEG NFR BLD: 87.1 %
NITRITE UR QL STRIP: NEGATIVE
PH UR STRIP: 8 [PH] (ref 5–9)
PLATELET # BLD AUTO: 383 K/UL (ref 130–400)
POTASSIUM SERPL-SCNC: 3.8 MEQ/L (ref 3.4–4.9)
PROT SERPL-MCNC: 6 G/DL (ref 6.3–8)
PROT UR STRIP-MCNC: ABNORMAL MG/DL
RBC # BLD AUTO: 4.31 M/UL (ref 4.2–5.4)
RBC #/AREA URNS AUTO: ABNORMAL /HPF (ref 0–5)
SODIUM SERPL-SCNC: 140 MEQ/L (ref 135–144)
SP GR UR STRIP: 1.02 (ref 1–1.03)
URINE REFLEX TO CULTURE: ABNORMAL
UROBILINOGEN UR STRIP-ACNC: 1 E.U./DL
WBC # BLD AUTO: 12.3 K/UL (ref 4.8–10.8)
WBC #/AREA URNS AUTO: ABNORMAL /HPF (ref 0–5)

## 2024-05-28 PROCEDURE — 99285 EMERGENCY DEPT VISIT HI MDM: CPT

## 2024-05-28 PROCEDURE — 81001 URINALYSIS AUTO W/SCOPE: CPT

## 2024-05-28 PROCEDURE — 6830039000 HC L3 TRAUMA ALERT

## 2024-05-28 PROCEDURE — 85025 COMPLETE CBC W/AUTO DIFF WBC: CPT

## 2024-05-28 PROCEDURE — 70450 CT HEAD/BRAIN W/O DYE: CPT

## 2024-05-28 PROCEDURE — 80053 COMPREHEN METABOLIC PANEL: CPT

## 2024-05-28 PROCEDURE — 2580000003 HC RX 258: Performed by: EMERGENCY MEDICINE

## 2024-05-28 PROCEDURE — 99284 EMERGENCY DEPT VISIT MOD MDM: CPT

## 2024-05-28 PROCEDURE — 36415 COLL VENOUS BLD VENIPUNCTURE: CPT

## 2024-05-28 PROCEDURE — 73110 X-RAY EXAM OF WRIST: CPT

## 2024-05-28 PROCEDURE — 72125 CT NECK SPINE W/O DYE: CPT

## 2024-05-28 RX ORDER — 0.9 % SODIUM CHLORIDE 0.9 %
1000 INTRAVENOUS SOLUTION INTRAVENOUS ONCE
Status: COMPLETED | OUTPATIENT
Start: 2024-05-28 | End: 2024-05-28

## 2024-05-28 RX ADMIN — SODIUM CHLORIDE 1000 ML: 9 INJECTION, SOLUTION INTRAVENOUS at 12:40

## 2024-05-28 NOTE — ED TRIAGE NOTES
Pt arrived to ED via EMS with c/o of fall. Pt lives with daughter and daughter states she found pt face down on the ground unsure of downtime. PT has dementia and is a&ox2 at baseline. Pt is unsure if she LOC. Pt was covered in urine. Pt right wrist/hand is swollen and puffy. Pt c/o of neck pain. Pt in c-collar. Pt has even and unlabored resps. Pt placed on cardiac monitor

## 2024-05-28 NOTE — ED PROVIDER NOTES
limits   URINALYSIS WITH REFLEX TO CULTURE       All other labs were within normal range or not returned as of this dictation.    EMERGENCY DEPARTMENT COURSE and DIFFERENTIAL DIAGNOSIS/MDM:   Vitals:    Vitals:    05/28/24 1343 05/28/24 1400 05/28/24 1416 05/28/24 1417   BP:  (!) 180/82  (!) 174/82   Pulse: 78 77 77 77   Resp: 26 22 16 23   Temp:       SpO2: 97% 96% 96% 98%   Weight:            MDM  xray of the right wrist shows a comminuted distal radius fracture with associated ulnar styloid fracture.  CT head and CT cervical spine are negative.  Labs are normal.  Patient is discharged home with family      CONSULTS:  None    PROCEDURES:  Unless otherwise noted below, none     Procedures    FINAL IMPRESSION      1. Fall, initial encounter    2. Right wrist fracture, closed, initial encounter          DISPOSITION/PLAN   DISPOSITION Decision To Discharge 05/28/2024 02:38:01 PM      PATIENT REFERRED TO:  Abraham Parker MD  [address] [facility] [address] [phone]    In 2 days        DISCHARGE MEDICATIONS:  New Prescriptions    No medications on file          (Please note that portions of this note were completed with a voice recognition program.  Efforts were made to edit the dictations but occasionally words are mis-transcribed.)    Nathaniel Argueta MD (electronically signed)  Attending Emergency Physician         Nathaniel Argueta MD  05/28/24 9851       Nathaniel Argueta MD  05/28/24 5483

## 2024-05-29 ENCOUNTER — OFFICE VISIT (OUTPATIENT)
Dept: ORTHOPEDIC SURGERY | Facility: CLINIC | Age: 84
End: 2024-05-29
Payer: MEDICARE

## 2024-05-29 DIAGNOSIS — S52.501A CLOSED FRACTURE OF DISTAL ENDS OF RIGHT RADIUS AND ULNA, INITIAL ENCOUNTER: ICD-10-CM

## 2024-05-29 DIAGNOSIS — S52.601A CLOSED FRACTURE OF DISTAL ENDS OF RIGHT RADIUS AND ULNA, INITIAL ENCOUNTER: ICD-10-CM

## 2024-05-29 PROCEDURE — 1159F MED LIST DOCD IN RCRD: CPT | Performed by: INTERNAL MEDICINE

## 2024-05-29 PROCEDURE — 99213 OFFICE O/P EST LOW 20 MIN: CPT | Performed by: INTERNAL MEDICINE

## 2024-05-29 PROCEDURE — 29065 APPL CST SHO TO HAND LNG ARM: CPT | Performed by: INTERNAL MEDICINE

## 2024-05-29 PROCEDURE — 99203 OFFICE O/P NEW LOW 30 MIN: CPT | Performed by: INTERNAL MEDICINE

## 2024-05-29 PROCEDURE — L3670 SO ACRO/CLAV CAN WEB PRE OTS: HCPCS | Performed by: INTERNAL MEDICINE

## 2024-05-29 NOTE — PROGRESS NOTES
Acute Injury New Patient Visit    CC:   Chief Complaint   Patient presents with    Right Wrist - Pain     Patient fell 5/28/24 out of bed. [facility] had xrays 5/28/24.        HPI: Olesya is a 83 y.o. female presents today for evaluation for acute right wrist injury sustained yesterday after she fell out of bed. She went to the ER where x-rays were taken. She is here for initial evaluation. She has dementia.  History obtained from the daughter.        Review of Systems   GENERAL: Negative for malaise, significant weight loss, fever  MUSCULOSKELETAL: See HPI  NEURO:  Negative for numbness / tingling     Past Medical History  Past Medical History:   Diagnosis Date    Cataract     Cataract     bilateral    CVA (cerebral vascular accident) (Multi) 11/15/2017    left thalamic    Syncope 11/15/2011    Vascular dementia of acute onset without behavioral disturbance (Multi)        Medication review  Medication Documentation Review Audit       Reviewed by Annemarie Lopez RN (Registered Nurse) on 02/20/24 at 2204      Medication Order Taking? Sig Documenting Provider Last Dose Status   amLODIPine (Norvasc) 5 mg tablet [id number] No Take 1 tablet (5 mg) by mouth once daily. Historical Provider, MD 2/19/2024 Active   EPINEPHrine 0.3 mg/0.3 mL injection syringe [id number] No Inject 0.3 mL (0.3 mg) into the muscle 1 time if needed for anaphylaxis. Inject into upper leg. Call 911 after use. Historical Provider, MD Unknown Active   sertraline (Zoloft) 50 mg tablet [id number] No Take 1 tablet (50 mg) by mouth once daily. Historical Provider, MD 2/19/2024 Active                    Allergies  Allergies   Allergen Reactions    Chocolate Rash    Nut - Unspecified Rash    Shellfish Derived Rash       Social History  Social History     Socioeconomic History    Marital status:      Spouse name: Not on file    Number of children: Not on file    Years of education: Not on file    Highest education level: Not on file   Occupational History     Not on file   Tobacco Use    Smoking status: Former     Current packs/day: 0.00     Types: Cigarettes     Start date: 1946     Quit date: 1954     Years since quittin.5    Smokeless tobacco: Never   Substance and Sexual Activity    Alcohol use: Not on file    Drug use: Never    Sexual activity: Not on file   Other Topics Concern    Not on file   Social History Narrative    Not on file     Social Determinants of Health     Financial Resource Strain: Patient Unable To Answer (2024)    Overall Financial Resource Strain (CARDIA)     Difficulty of Paying Living Expenses: Patient unable to answer   Food Insecurity: Not on file   Transportation Needs: Unknown (2024)    PRAPARE - Transportation     Lack of Transportation (Medical): Patient unable to answer     Lack of Transportation (Non-Medical): No   Physical Activity: Not on file   Stress: Not on file   Social Connections: Not on file   Intimate Partner Violence: Not on file   Housing Stability: Patient Unable To Answer (2024)    Housing Stability Vital Sign     Unable to Pay for Housing in the Last Year: Patient unable to answer     Number of Places Lived in the Last Year: 1     Unstable Housing in the Last Year: Patient unable to answer       Surgical History  Past Surgical History:   Procedure Laterality Date    ANKLE SURGERY Left     CATARACT EXTRACTION Right     Dr. Logan    CHOLECYSTECTOMY         Physical Exam:  GENERAL:  Patient is awake, alert, and oriented to person place and time.  Patient appears well nourished and well kept.  Affect Calm, Not Acutely Distressed.  HEENT:  Normocephalic, Atraumatic, EOMI  CARDIOVASCULAR:  Hemodynamically stable.  RESPIRATORY:  Normal respirations with unlabored breathing.  Extremity: Right hand and wrist shows swelling of the right wrist.  Pain of the distal radius and distal ulna.  There is no pain in the scaphoid bone.  Her flexor and extensor mechanism tact.  Satisfactory capillary fill  time.  She can flex her right elbow to 130 degrees, full extension is 0 degrees she is neurovascularly tact.      Diagnostics: X-rays reviewed  XR lumbar spine 2-3 views  Narrative: Interpreted By:  Karson Perez,   STUDY:  XR LUMBAR SPINE 2-3 VIEWS; ;  2/20/2024 12:31 pm      INDICATION:  Signs/Symptoms:ortho request: lumbar burst fracture on CT.      COMPARISON:  None.      ACCESSION NUMBER(S):  [id number]      ORDERING CLINICIAN:  YUNIER MONTERROSO      FINDINGS:  Lumbar Spine, two views      There is a burst fracture of the L3 vertebral body with sclerosis and  mild loss of height. There is no evidence of retropulsion of osseous  fragments in the spinal canal radiographically. There is no  malalignment.      Mild multilevel spondylitic changes present.      Impression: Burst fracture of L3 vertebral body without significant retropulsion  of osseous fragments radiographically          MACRO:  None      Signed by: Karson Perez 2/20/2024 1:04 PM  Dictation workstation:   [id number]  CT head wo IV contrast  Narrative: Interpreted By:  Kathleen Urrutia,   STUDY:  CT HEAD WO IV CONTRAST;  2/20/2024 11:52 am      INDICATION:  Signs/Symptoms:Stability scan.      COMPARISON:  02/20/2024 at 6:46 a.m.      ACCESSION NUMBER(S):  [id number]      ORDERING CLINICIAN:  JOSE LUIS LOPEZ      TECHNIQUE:  Noncontrast axial CT scan of head was performed. Angled reformats in  brain and bone windows were generated. The images were reviewed in  bone, brain, blood and soft tissue windows. Coronal and sagittal  reformats are provided for review.      FINDINGS:  There is again a mixed attenuation extra-axial collection overlying  the right cerebral hemisphere currently measuring up to approximately  10 mm in greatest thickness, similar from the previous examination.  There is persistent mass effect with asymmetric sulcal effacement on  the right and very minimal effacement of the right lateral ventricle.  There is again at most  trace bowing of the septum pellucidum to the  left.      There are again extensive areas of abnormal diminished attenuation  within the subcortical and periventricular white matter. There is  subtle hypodensity with loss of gray/white matter differentiation  involving portions of the right temporal and parietal lobes. It is  uncertain to what extent this is due to artifact. There are  hypodensities in the basal ganglia, thalami, and external capsules  which may represent lacunar infarcts or perivascular spaces. The  posterior fossa is degraded by beam hardening artifact. There are  hypodensities in the abel.      There is prominence of ventricles and sulci compatible with diffuse  parenchymal volume loss.      There are again small retention cysts or polyps in the left maxillary  sinus. The mastoid air cells are clear.      Impression: Redemonstration of subdural hematoma overlying the right cerebral  hemisphere with associated mass effect overall very similar from the  examination performed earlier on the same date.      MACRO:  None      Signed by: Kathleen Urrutia 2/20/2024 11:57 AM  Dictation workstation:   [id number]  CT head wo IV contrast  Narrative: Interpreted By:  Kathleen Urrutia and Meyers Emily   STUDY:  CT HEAD WO IV CONTRAST;  2/20/2024 6:58 am      INDICATION:  Signs/Symptoms:SDH, want to monitor for stability.      COMPARISON:  CT head without contrast 02/19/2024      ACCESSION NUMBER(S):  [id number]      ORDERING CLINICIAN:  KATHLEEN WADDELL      TECHNIQUE:  Noncontrast axial CT scan of head was performed. Angled reformats in  brain and bone windows were generated. The images were reviewed in  bone, brain, blood and soft tissue windows.      FINDINGS:  The examination is motion degraded.      CSF Spaces: There is prominence of ventricles and sulci compatible  with diffuse parenchymal volume loss. There is interval increase in a  now 1.0 cm mixed density extra-axial fluid collection overlying  the  right frontal and parietal lobe (series 205, image 43), previously  measuring up to 0.9 cm. There is interval increase in mixed high  density focus within the fluid collection when compared to prior  exam, suggestive of increased acute blood products. Persistent mild  mass effect on the adjacent parenchyma. There is trace bowing of the  septum pellucidum to the left. Hyperdensity along the falx and  tentorium is favored to be related to the dural sinuses.      Parenchyma: There are again nonspecific areas of diminished  attenuation in the subcortical and periventricular white matter. The  grey-white differentiation is intact. There is no mass effect or  midline shift.  There is no intracranial hemorrhage. Atherosclerotic  calcifications of the carotid siphons.      Calvarium: The calvarium is unremarkable.      Paranasal sinuses and mastoids: Mucous retention cysts versus polyps  within the inferior left frontal sinus and left maxillary sinus.  Mastoid air cells are clear.      Impression: 1. Interval worsening of right hemispheric subdural hematoma  measuring up to 1.0 cm in maximal thickness, previously measuring up  to 0.9 cm there is persistent associated local mass effect with at  most minimal bowing of the septum pellucidum to the left.  2. Nonspecific periventricular and subcortical white matter changes  most likely representing small-vessel ischemic disease in a patient  of this age with generalized parenchymal volume loss, similar when  compared to prior exam.      I personally reviewed the images/study, and I agree with the findings  as stated above.      MACRO:  Temitope Oates discussed the significance and urgency of this critical  finding by telephone with KATHLEEN WADDELL on 2/20/2024 at 7:11 am.  (**-RCF-**) Findings:  See findings.          Signed by: Kathleen Urrutia 2/20/2024 7:45 AM  Dictation workstation:   [id number]  XR wrist right 3+ views  Narrative: Interpreted By:  Karson Perez,  and  Иван Linn   STUDY:  XR WRIST RIGHT 3+ VIEWS; ;  2/20/2024 5:54 am      INDICATION:  Signs/Symptoms:Hx fracture.      COMPARISON:  Study performed earlier the same day.      ACCESSION NUMBER(S):  [id number]      ORDERING CLINICIAN:  JOSE LUIS LOPEZ      FINDINGS:  Three views of the right wrist were obtained.      Please note, overlying casting material partially limits fine osseous  and soft tissue detail.      Impacted fracture of the distal radius with a proximally 0.6 cm of  fracture fragment overriding and volar displacement of a large  fracture fragment. There is intra-articular extension. Further, there  is an acute, mildly distracted ulnar styloid fracture.      There is increased scapholunate interval measuring up to 0.3 cm.      There is resultant minimal positive ulnar variance.      Impression: 1. Redemonstration of acute, comminuted and impacted fracture of the  distal radius with intra-articular extension.  2. Acute, mildly distracted ulnar styloid fracture.  3. Widening of the scapholunate interval, which may suggests  scapholunate ligamentous injury.          I personally reviewed the images/study, and I agree with the findings  as stated above. This study was interpreted at [place], [place].      MACRO:  None      Signed by: Karson Perez 2/20/2024 6:59 AM  Dictation workstation:   [id number]  ECG 12 lead  See ED provider note for full interpretation and clinical correlation  Confirmed by Lance Stover ([id number]) on 2/20/2024 4:09:09 AM  CT thoracic spine wo IV contrast, CT lumbar spine wo IV contrast, CT chest abdomen pelvis w IV contrast  Narrative: Interpreted By:  Sania Gandhi and Meyers Emily   STUDY:  CT CHEST ABDOMEN PELVIS W IV CONTRAST; CT THORACIC SPINE WO IV  CONTRAST; CT LUMBAR SPINE WO IV CONTRAST;  2/20/2024 1:19 am      INDICATION:  Signs/Symptoms:fall.      COMPARISON:  None.      ACCESSION NUMBER(S):  [id number];  [id number]; [id number]      ORDERING CLINICIAN:  JESSIE WADDELL      TECHNIQUE:  Contiguous axial images of the chest, abdomen, and pelvis were  obtained after the intravenous administration of 100 mL Omnipaque 350  contrast. Coronal and sagittal reformatted images were reconstructed  from the axial data.      Multiplanar reformatted images of the thoracic and lumbar spine were  reconstructed from source data of concurrent CT chest/abdomen/pelvis  acquisition.      FINDINGS:  CT CHEST:      MEDIASTINUM AND LYMPH NODES: The esophagus appears within normal  limits. No enlarged intrathoracic or axillary lymph nodes by imaging  criteria. No pneumomediastinum.      VESSELS: Normal caliber thoracic aorta. No evidence of traumatic  aortic injury. No significant aortic atherosclerosis.      HEART: Normal size. Moderate coronary artery calcifications. No  significant pericardial effusion.      LUNG, AIRWAYS, PLEURA: Dependent bibasilar atelectasis. Mild  interlobular septal thickening. There is slight mosaic attenuation of  the upper lobes predominantly. No focal consolidation, pleural  effusion or sizable pneumothorax seen.      CHEST WALL SOFT TISSUES: No discernible acute abnormality.      OSSEOUS STRUCTURES: No acute osseous abnormality.          CT ABDOMEN/PELVIS:      ABDOMINAL WALL: Small fat containing umbilical hernia.      LIVER: 19.7 cm, enlarged. No significant parenchymal abnormality.      BILE DUCTS: Mild biliary ductal prominence, indeterminate in the post  cholecystectomy state.      GALLBLADDER: Postsurgical change of cholecystectomy.      SPLEEN: No significant abnormality.      PANCREAS: There is a 3.0 x 1.2 cm cystic lesion within the junction  between the pancreatic head and body (series 301, image 53).      ADRENALS: No significant abnormality.      KIDNEYS, URETERS, BLADDER: No significant abnormality.      REPRODUCTIVE ORGANS: No significant abnormality.      VESSELS: Moderate atherosclerotic  calcifications of the abdominal  aorta and its branches. There are mild-to-moderate atherosclerotic  calcifications of the origin of the celiac axis and superior  mesenteric artery without occlusion. No abdominal aortic aneurysm.      LYMPH NODES/RETROPERITONEUM: No acute retroperitoneal abnormality. No  enlarged lymph nodes.      BOWEL/MESENTERY/PERITONEUM: Stomach is unremarkable in appearance. No  bowel wall thickening or dilatation. Moderate colonic stool burden  with rectal distention measuring up to 7.5 cm without evidence of  stercoral colitis. Normal appendix. No ascites, free air or fluid  collection.      Partial ankylosis of the sacroiliac joints.      CT THORACIC SPINE:      There are 12 rib-bearing vertebral bodies.      PARASPINAL SOFT TISSUES: No paravertebral fluid collection or  significant edema. ALIGNMENT: Mild rightward curvature which may be  partially positional, otherwise alignment is well-maintained. No  traumatic spondylolisthesis or traumatic facet widening. VERTEBRAE:  Veins appear partially demineralized. No acute fracture. Vertebral  body heights are maintained. SPINAL CANAL/INTERVERTEBRAL DISCS: No  high-grade spinal canal stenosis. Varying degrees of degenerative  disc disease, with multilevel intestinal fights suggestive of  ankylosing spondylitis.          CT LUMBAR SPINE:      There are 5 lumbarized vertebral bodies with the last intervertebral  body disc space labeled L5-S1.      PARASPINAL SOFT TISSUES: No paravertebral fluid collection or  significant edema. ALIGNMENT: Alignment is well-maintained. No  traumatic spondylolisthesis or traumatic facet widening. VERTEBRAE:  Acute, severely comminuted compression fracture of the L3 vertebral  body with approximately 40% vertebral body height loss. There is  anterior displacement of few osseous fragments without significant  mass effect. There is no significant retropulsion of osseous  fragments into the spinal canal. SPINAL  CANAL/INTERVERTEBRAL DISCS:  No high-grade spinal canal stenosis. No significant intervertebral  body disc height loss. NEURAL FORAMINA: Circumferential disc bulge  and multilevel facet arthropathy with mild left neural foraminal  stenosis at L4-L5 and L5-S1.      Impression: CT CHEST/ABDOMEN/PELVIS:  1. No acute posttraumatic finding within the chest, abdomen, or  pelvis.  2. Cystic pancreatic lesion measuring up to 3.0 cm within the  junction of the pancreatic head and body without distal pancreatic  ductal dilatation, which may represent a IPMN. Nonemergent MRI may be  considered for further evaluation.  3. Slight mosaic attenuation of the predominantly upper lobes of the  lungs bilaterally, which may represent a component of air trapping.  4. Moderate stool burden. Correlation for constipation is suggested.          CT THORACIC AND LUMBAR SPINE:  1. Acute, comminuted burst type compression fracture of the L3  vertebral body with approximately 40% vertebral body height loss. No  significant retropulsion of osseous fragments into the spinal canal.  2. Multilevel circumferential disc bulge and facet arthropathy with  resultant mild left neural foraminal stenosis at L4-L5 and L5-S1.  3. Findings suggestive of ankylosing spondylitis.      I personally reviewed the images/study, and I agree with the findings  as stated above. This study was interpreted at [place], [place].      MACRO:  None.      Signed by: Sania Gandhi 2/20/2024 3:02 AM  Dictation workstation:   [id number]  CT cervical spine wo IV contrast  Narrative: Interpreted By:  Sania Gandhi and Meyers Emily   STUDY:  CT CERVICAL SPINE WO IV CONTRAST;  2/20/2024 1:19 am      INDICATION:  Signs/Symptoms:fall.      COMPARISON:  CT Head without contrast 02/19/2024      ACCESSION NUMBER(S):  [id number]      ORDERING CLINICIAN:  JESSIE WADDELL      TECHNIQUE:  Axial CT images of the cervical spine are obtained.  Axial, coronal  and sagittal reconstructions are provided for review.      FINDINGS:      Fractures: There is no evidence for an acute fracture of the cervical  spine. Vertebral body heights are maintained.      Vertebral Alignment: Minimal retrolisthesis of C2 on C3, C3 on C4 and  C4 on C5. Facet joint an craniocervical alignment maintained.      Craniocervical Junction: The odontoid process and craniocervical  junction are intact.      Vertebrae/Disc Spaces:  The cervical vertebral body heights are  intact. There is multilevel loss of intervertebral body disc height,  most notably at C5-C6, C6-C7, and C7-T1 with associated vacuum disc  phenomenon. Further, there is osteophytic spurring and adjacent  subchondral cystic formation and Schmorl's nodes. Disc osteophyte  complex at C5-C6 with resultant mild spinal canal stenosis. Further,  there is multilevel facet arthrosis with resultant multilevel neural  foraminal stenosis, most notably for moderate bilateral neural  foraminal stenosis at C5-C6 and C6-C7.      Prevertebral/Paraspinal Soft Tissues: The prevertebral and paraspinal  soft tissues are unremarkable.      Partially visualized lung apices are clear. Trace secretions are  noted within the proximal esophageal lumen.      Impression: 1. No evidence for an acute fracture or subluxation of the cervical  spine.  2. Spondylotic change of the cervical spine with mild central canal  stenosis at C5-C6 and moderate bilateral neural foraminal stenosis at  C5-C6 and C6-C7.      I personally reviewed the images/study, and I agree with the findings  as stated above. This study was interpreted at [place], [place].      MACRO:  None      Signed by: Sania Gandhi 2/20/2024 2:48 AM  Dictation workstation:   [id number]  XR wrist left 3+ views, XR forearm left 2 views  Narrative: Interpreted By:  Sania Gandhi,  and Иван Linn   STUDY:  XR WRIST LEFT 3+ VIEWS; XR FOREARM  LEFT 2 VIEWS; ;  2/20/2024 1:22 am      INDICATION:  Signs/Symptoms:Left wrist fracture; Signs/Symptoms:fall, LUE injury.      COMPARISON:  None.      ACCESSION NUMBER(S):  [id number]; [id number]      ORDERING CLINICIAN:  JOSE LUIS LOPEZ; JESSIE WADDELL      FINDINGS:  Three views of the left wrist and two views of the left forearm were  obtained.      Please note, overlying casting material partially limits fine osseous  and soft tissue detail.      Impacted fracture of the distal radius with approximately 0.6 cm of  fracture fragment overriding and volar displacement of a large  fracture fragment. Intra-articular extension suspected. There is an  acute, mildly distracted ulnar styloid fracture.      There is resultant minimal positive ulnar variance.      No additional fractures of the proximal radius or ulna within  constraints of artifact.      Joints are maintained.      Impression: 1. Acute, comminuted and impacted fracture of the distal radius with  probable intra-articular extension  2. Acute, mildly distracted ulnar styloid fracture.      I personally reviewed the images/study, and I agree with the findings  as stated above. This study was interpreted at [place], [place].      MACRO:  None      Signed by: Sania Gandhi 2/20/2024 2:44 AM  Dictation workstation:   [id number]  CT head wo IV contrast  Narrative: Interpreted By:  Sania Gandhi and Meyers Emily   STUDY:  CT HEAD WO IV CONTRAST;  2/19/2024 11:58 pm      INDICATION:  Signs/Symptoms:Fall head injury, hx previous subdural.      COMPARISON:  None.      ACCESSION NUMBER(S):  [id number]      ORDERING CLINICIAN:  JOSE LUIS LOPEZ      TECHNIQUE:  Noncontrast axial CT scan of head was performed. Angled reformats in  brain and bone windows were generated. The images were reviewed in  bone, brain, blood and soft tissue windows.      FINDINGS:  CSF Spaces: The ventricles, sulci and basal cisterns  enlarged,  concordant with parenchymal volume loss. There is a 9 mm mixed  density extra-axial fluid collection overlying the right frontal and  parietal lobe. There are numerous high density foci noted throughout  the collection (series 204, image 51, 64, and 66; series 205, image  50). There is mild mass effect on the adjacent parenchyma.      Parenchyma: Moderate generalized parenchymal volume loss.  Periventricular and subcortical white matter hypodensities, which are  likely sequelae of chronic small-vessel ischemic change. The  grey-white differentiation is intact. There is no mass effect or  midline shift.  There is no intracranial hemorrhage. Atherosclerotic  calcifications of the carotid arteries.      Calvarium: The calvarium is unremarkable.      Paranasal sinuses and mastoids: Mucous retention cysts versus polyps  within inferior left frontal sinus and left maxillary sinus. Mastoid  air cells are clear.      Postsurgical changes of the lenses.      Impression: 1. Acute on chronic right subdural hematoma measuring up to 9 mm in  maximal thickness with minimal adjacent mass effect. No midline shift.  2. Non-specific mild white matter changes and generalized parenchymal  volume loss.          I personally reviewed the images/study, and I agree with the findings  as stated above. This study was interpreted at [facility], [place], [place].      MACRO:  Temitope Oates discussed the significance and urgency of this critical  finding by telephone with Dr. Oskar Rod MD on 2/20/2024 at 12:17  am.  (**-RCF-**) Findings:  See findings.          Signed by: Sania Gandhi 2/20/2024 12:49 AM  Dictation workstation:   [id number]      Procedure: None    Assessment:   Acute displaced right distal radius fracture  2.   Ulna styloid process fracture    Plan: Olesya presents today for initial evaluation for acute right wrist injury sustained yesterday after a fall.  She has a history of  dementia, we discussed various treatment options with the daughter, and elected for nonsurgical treatment, we placed her into a long arm cast and a sling. She will follow-up with Dr Hall next week, repeat x-rays of the right wrist in the cast, 3 views, AP, lateral, and oblique views.  No fracture charge for today's visit, as patient will be following up with Dr. Hall for continued fracture care.    No orders of the defined types were placed in this encounter.     At the conclusion of the visit there were no further questions by the patient/family regarding their plan of care.  Patient was instructed to call or return with any issues, questions, or concerns regarding their injury and/or treatment plan described above.     05/29/24 at 10:22 AM - Molina Beltre MD  Scribe Attestation  By signing my name below, I, Reji Henriquez, Scribe   attest that this documentation has been prepared under the direction and in the presence of Molina Beltre MD.    Office: [phone]    This note was prepared using voice recognition software.  The details of this note are correct and have been reviewed, and corrected to the best of my ability.  Some grammatical errors may persist related to the Dragon software.

## 2024-06-06 ENCOUNTER — OFFICE VISIT (OUTPATIENT)
Dept: ORTHOPEDIC SURGERY | Facility: CLINIC | Age: 84
End: 2024-06-06
Payer: MEDICARE

## 2024-06-06 ENCOUNTER — HOSPITAL ENCOUNTER (OUTPATIENT)
Dept: RADIOLOGY | Facility: CLINIC | Age: 84
Discharge: HOME | End: 2024-06-06
Payer: MEDICARE

## 2024-06-06 DIAGNOSIS — S52.501A CLOSED FRACTURE OF DISTAL ENDS OF RIGHT RADIUS AND ULNA, INITIAL ENCOUNTER: ICD-10-CM

## 2024-06-06 DIAGNOSIS — S52.601A CLOSED FRACTURE OF DISTAL ENDS OF RIGHT RADIUS AND ULNA, INITIAL ENCOUNTER: ICD-10-CM

## 2024-06-06 PROCEDURE — 25600 CLTX DST RDL FX/EPHYS SEP WO: CPT | Performed by: ORTHOPAEDIC SURGERY

## 2024-06-06 PROCEDURE — 73110 X-RAY EXAM OF WRIST: CPT | Mod: RIGHT SIDE | Performed by: ORTHOPAEDIC SURGERY

## 2024-06-06 PROCEDURE — 99214 OFFICE O/P EST MOD 30 MIN: CPT | Performed by: ORTHOPAEDIC SURGERY

## 2024-06-06 PROCEDURE — 73110 X-RAY EXAM OF WRIST: CPT | Mod: RT

## 2024-06-06 NOTE — PROGRESS NOTES
6/6/2024    Chief Complaint   Patient presents with    Right Wrist - Pain, New Patient Visit     Acute displaced right distal radius fx, ulnar styloid process fx        History of Present Illness:  Patient Olesya Rodriguez , 83 y.o. female, presents today, 6/6/2024, for evaluation of right wrist  distal radius and ulnar styloid fracture, approximately 8 days out from injury.  She is here today with her daughter who provides history.  Patient has history of dementia.  Her daughter states that she originally fractured this wrist in a similar pattern in February of this year, she originally had care through the [facility] system and then was transferred to  as she had a head injury at the time that required hospitalization.  They followed up with an orthopedics outfit in [place] and she feels they did not receive the most consistent care overall.  She was immobilized in a cast for some time.  This was finally removed about a month or so ago.  However, 8 days ago she states her mother fell out of bed and now has recurrence of pain and swelling and new wrist injury.  She took her to the [facility] ER where she was diagnosed with acute right distal radius and ulnar styloid fracture.  She was then seen by one of her treating partners, Dr. Beltre, placed in long-arm cast and instructed to follow-up with Dr. Hall in 1 week for repeat x-rays within the confines of this .         Review of Systems:   GENERAL: Negative  GI: Negative  MUSCULOSKELETAL: See HPI  SKIN: Negative  NEURO:  Negative     Physical Exam:  GENERAL:  Alert and oriented to person, place, and time.  No acute distress and breathing comfortably; pleasant and cooperative with the examination.  HEENT:  Head is normocephalic and atraumatic.  NECK:  Supple, no visible swelling.  CARDIOVASCULAR:  No palpable tachycardia.  LUNGS:  No audible wheezing or labored breathing.  ABDOMEN:  Nondistended.  Extremities: Evaluation of the right upper extremity finds  the patient to have  intact sensorium to axillary, radial, median and ulnar nerves. There are no open wounds. There are no signs of infection. There is no evidence of lymphedema or lymphatic streaking. The patient has supple compartments of the right arm, forearm and hand.  Patient is a well-padded, well molded appropriately fitting long-arm cast to the right upper extremity.  This appears to be in good repair.  She has mild ecchymosis and appropriate range of motion within the confines of this.     Imaging/Test Results:  3 views of the right wrist taken in the [place] office today show evidence of acute distal radius fracture with superimposed malunion and minimally displaced right ulnar styloid fracture with continued adequate alignment.     Assessment:  Right distal radius and ulnar styloid fracture, 8 days out from new injury.     Plan:  Treatment options were discussed including operative and nonoperative strategies.  Patient's daughter has strong preference for continued nonoperative management.  We recommend for continued nonweightbearing in long-arm cast immobilization for 3 additional weeks.  The current cast is in good repair we will retain this.  Follow-up in 3 weeks for repeat clinical exam, x-rays 3 views of the right wrist out of cast at next visit.  All questions answered at today's visit.  Likely transition to short arm cast at that time.    In a face to face encounter, I performed a history and physical examination, discussed pertinent diagnostic studies if indicated, and discussed diagnosis and management strategies with both the patient and the mid-level provider. I reviewed the mid-level's note and agree with the documented findings and plan of care.  Patient presents today for evaluation of right distal radius fracture.  She was placed into a long-arm cast.  She presents with her daughter for discussion regarding treatment strategies.  Patient has baseline of dementia.  X-rays demonstrate  displaced distal radius fracture with shortening and apex volar deformity.  Good digital range of motion.  Digits well-perfused.  Stable long-arm cast.  Treatment options were discussed.  We talked about operative and nonoperative strategies.  We talked about intraoperative techniques and postoperative protocols.  Ultimately the daughter elects to proceed forth with nonoperative management by way of closed treatment in the cast.  Plan for follow-up in 3 weeks for x-rays of the right wrist out of the cast.  Based on the amount of healing she will either be placed into a Velcro splint or given the dementia more likely a 2-week period of short arm cast immobilization.

## 2024-07-02 ENCOUNTER — OFFICE VISIT (OUTPATIENT)
Dept: ORTHOPEDIC SURGERY | Facility: CLINIC | Age: 84
End: 2024-07-02
Payer: MEDICARE

## 2024-07-02 ENCOUNTER — HOSPITAL ENCOUNTER (OUTPATIENT)
Dept: RADIOLOGY | Facility: CLINIC | Age: 84
Discharge: HOME | End: 2024-07-02
Payer: MEDICARE

## 2024-07-02 DIAGNOSIS — S52.601A CLOSED FRACTURE OF DISTAL ENDS OF RIGHT RADIUS AND ULNA, INITIAL ENCOUNTER: ICD-10-CM

## 2024-07-02 DIAGNOSIS — S52.501A CLOSED FRACTURE OF DISTAL ENDS OF RIGHT RADIUS AND ULNA, INITIAL ENCOUNTER: ICD-10-CM

## 2024-07-02 PROCEDURE — 29085 APPL CAST HAND&LWR FOREARM: CPT | Performed by: ORTHOPAEDIC SURGERY

## 2024-07-02 PROCEDURE — 1036F TOBACCO NON-USER: CPT | Performed by: ORTHOPAEDIC SURGERY

## 2024-07-02 PROCEDURE — 73110 X-RAY EXAM OF WRIST: CPT | Mod: RIGHT SIDE | Performed by: ORTHOPAEDIC SURGERY

## 2024-07-02 PROCEDURE — 99211 OFF/OP EST MAY X REQ PHY/QHP: CPT | Mod: 25 | Performed by: ORTHOPAEDIC SURGERY

## 2024-07-02 PROCEDURE — 1159F MED LIST DOCD IN RCRD: CPT | Performed by: ORTHOPAEDIC SURGERY

## 2024-07-02 PROCEDURE — 73110 X-RAY EXAM OF WRIST: CPT | Mod: RT

## 2024-07-02 NOTE — PROGRESS NOTES
History present illness: Patient presents today with her caregiver for ongoing care of right distal radius fracture.  She is undergoing nonoperative management.        Physical examination:  General: Alert and oriented to person, place, and time.  No acute distress and breathing comfortably: Pleasant and cooperative with examination.  Extremities: Evaluation of the right upper extremity finds the patient had palpable radial artery at the wrist with brisk capillary refill to all digits.  Patient has intact sensation to axillary radial median and ulnar nerves.  There are no open wounds.  There are no signs of infection.  There is no evidence of lymphedema or lymphatic streaking.  The patient has supple compartments to right arm forearm and hand.  Continued tenderness to the right distal radius and ulna      Diagnostic studies: X-rays of the right wrist demonstrate malunion of distal radius healing in a shortened position.  Distal radius and ulnar styloid fracture appear to be healing.      Assessment: Healing distal radius and ulnar styloid fracture      Plan: Recommendations were made for application of short arm cast continuance of nonweightbearing 2-week follow-up for x-rays of the right wrist.  Patient's caregiver is agreeable.  X-rays out of cast upon return.      Procedure:        Procedure:

## 2024-07-18 ENCOUNTER — APPOINTMENT (OUTPATIENT)
Dept: ORTHOPEDIC SURGERY | Facility: CLINIC | Age: 84
End: 2024-07-18
Payer: MEDICARE

## 2024-07-30 ENCOUNTER — HOSPITAL ENCOUNTER (OUTPATIENT)
Dept: RADIOLOGY | Facility: CLINIC | Age: 84
Discharge: HOME | End: 2024-07-30
Payer: MEDICARE

## 2024-07-30 ENCOUNTER — OFFICE VISIT (OUTPATIENT)
Dept: ORTHOPEDIC SURGERY | Facility: CLINIC | Age: 84
End: 2024-07-30
Payer: MEDICARE

## 2024-07-30 VITALS — WEIGHT: 120 LBS | HEIGHT: 62 IN | BODY MASS INDEX: 22.08 KG/M2

## 2024-07-30 DIAGNOSIS — S52.601A CLOSED FRACTURE OF DISTAL ENDS OF RIGHT RADIUS AND ULNA, INITIAL ENCOUNTER: Primary | ICD-10-CM

## 2024-07-30 DIAGNOSIS — S52.501A CLOSED FRACTURE OF DISTAL ENDS OF RIGHT RADIUS AND ULNA, INITIAL ENCOUNTER: Primary | ICD-10-CM

## 2024-07-30 DIAGNOSIS — S52.601A CLOSED FRACTURE OF DISTAL ENDS OF RIGHT RADIUS AND ULNA, INITIAL ENCOUNTER: ICD-10-CM

## 2024-07-30 DIAGNOSIS — S52.501A CLOSED FRACTURE OF DISTAL ENDS OF RIGHT RADIUS AND ULNA, INITIAL ENCOUNTER: ICD-10-CM

## 2024-07-30 PROCEDURE — 1036F TOBACCO NON-USER: CPT | Performed by: ORTHOPAEDIC SURGERY

## 2024-07-30 PROCEDURE — 73110 X-RAY EXAM OF WRIST: CPT | Mod: RIGHT SIDE | Performed by: ORTHOPAEDIC SURGERY

## 2024-07-30 PROCEDURE — 99211 OFF/OP EST MAY X REQ PHY/QHP: CPT | Performed by: ORTHOPAEDIC SURGERY

## 2024-07-30 PROCEDURE — 73110 X-RAY EXAM OF WRIST: CPT | Mod: RT

## 2024-07-30 PROCEDURE — 99024 POSTOP FOLLOW-UP VISIT: CPT | Performed by: ORTHOPAEDIC SURGERY

## 2024-07-30 NOTE — PROGRESS NOTES
7/30/2024    Chief Complaint   Patient presents with    Right Wrist - Follow-up     DOI: 05/28/24   displaced right distal radius fx, ulnar styloid process fx   Xrays today XOP       History of Present Illness:  Patient Olesya Rodriguez , 83 y.o. female, presents today, 7/30/2024, for evaluation of right wrist  distal radius and ulnar styloid fracture, 2 months out nonoperative management .  Patient is here today with family who helps provide from history.  She denies any new injury or trauma since last visit.  She has been immobilized in cast and compliant with nonweightbearing restrictions.  She denies any pain or discomfort.  She will work on gentle finger motion within the confines of her cast.       Review of Systems:   GENERAL: Negative  GI: Negative  MUSCULOSKELETAL: See HPI  SKIN: Negative  NEURO:  Negative     Physical Exam:  GENERAL:  Alert and oriented to person, place, and time.  No acute distress and breathing comfortably; pleasant and cooperative with the examination.  HEENT:  Head is normocephalic and atraumatic.  NECK:  Supple, no visible swelling.  CARDIOVASCULAR:  No palpable tachycardia.  LUNGS:  No audible wheezing or labored breathing.  ABDOMEN:  Nondistended.  Extremities: Evaluation of the right upper extremity finds the patient to have a palpable radial artery at the wrist with brisk capillary refill to all digits. The patient has intact sensorium to axillary, radial, median and ulnar nerves. There are no open wounds. There are no signs of infection. There is no evidence of lymphedema or lymphatic streaking. The patient has supple compartments of the right arm, forearm and hand.  She does have appropriate range of motion of the digits with no extensor lag rotational deformity with full composite fist.  She has about 30 degrees of flexion and extension arc.  She is nontender palpation over the ulnar styloid or distal radius.     Imaging/Test Results:  3 views of the right wrist show  evidence of healed distal radius and ulnar styloid fracture with continued adequate alignment in all planes.  Complete is feeling callus remission noted.     Assessment:  Healed distal radius and ulnar styloid fracture, 2 months out nonoperative management.     Plan:  We discussed transitioning to removable trauma splint for 2 weeks and then weaning from it when she feels ready.  We discussed home exercise program for motion recovery for active passive motion of the digits and wrist.  She can begin progressive weightbearing as tolerated today.  Follow-up in 6 weeks for repeat clinical and radiographic exam, x-rays 3 views the right wrist upon return or on an as-needed basis if symptoms dictate and she is doing well.  Patient family verbalized agreement understanding of plan for care.  All questions answered at today's visit.    Olga Kim PA-C

## 2024-09-10 ENCOUNTER — APPOINTMENT (OUTPATIENT)
Dept: ORTHOPEDIC SURGERY | Facility: CLINIC | Age: 84
End: 2024-09-10
Payer: MEDICARE

## 2024-10-26 ENCOUNTER — HOSPITAL ENCOUNTER (EMERGENCY)
Age: 84
Discharge: HOME | End: 2024-10-26
Attending: FAMILY MEDICINE
Payer: MEDICARE

## 2024-10-26 ENCOUNTER — APPOINTMENT (OUTPATIENT)
Dept: CT IMAGING | Age: 84
End: 2024-10-26
Payer: MEDICARE

## 2024-10-26 ENCOUNTER — APPOINTMENT (OUTPATIENT)
Dept: GENERAL RADIOLOGY | Age: 84
End: 2024-10-26
Payer: MEDICARE

## 2024-10-26 VITALS
TEMPERATURE: 98 F | OXYGEN SATURATION: 98 % | DIASTOLIC BLOOD PRESSURE: 75 MMHG | HEART RATE: 71 BPM | RESPIRATION RATE: 16 BRPM | BODY MASS INDEX: 19.74 KG/M2 | SYSTOLIC BLOOD PRESSURE: 140 MMHG | WEIGHT: 115 LBS

## 2024-10-26 DIAGNOSIS — S01.81XA FACIAL LACERATION, INITIAL ENCOUNTER: Primary | ICD-10-CM

## 2024-10-26 PROCEDURE — 99284 EMERGENCY DEPT VISIT MOD MDM: CPT

## 2024-10-26 PROCEDURE — 70450 CT HEAD/BRAIN W/O DYE: CPT

## 2024-10-26 PROCEDURE — 73560 X-RAY EXAM OF KNEE 1 OR 2: CPT

## 2024-10-26 PROCEDURE — 6370000000 HC RX 637 (ALT 250 FOR IP): Performed by: FAMILY MEDICINE

## 2024-10-26 PROCEDURE — 72125 CT NECK SPINE W/O DYE: CPT

## 2024-10-26 PROCEDURE — 12011 RPR F/E/E/N/L/M 2.5 CM/<: CPT

## 2024-10-26 RX ORDER — IBUPROFEN 600 MG/1
600 TABLET, FILM COATED ORAL ONCE
Status: COMPLETED | OUTPATIENT
Start: 2024-10-26 | End: 2024-10-26

## 2024-10-26 RX ADMIN — IBUPROFEN 600 MG: 600 TABLET, FILM COATED ORAL at 19:54

## 2024-10-26 ASSESSMENT — PAIN DESCRIPTION - PAIN TYPE: TYPE: ACUTE PAIN

## 2024-10-26 ASSESSMENT — PAIN DESCRIPTION - FREQUENCY: FREQUENCY: CONTINUOUS

## 2024-10-26 ASSESSMENT — PAIN DESCRIPTION - LOCATION: LOCATION: HEAD

## 2024-10-26 ASSESSMENT — PAIN DESCRIPTION - ONSET: ONSET: ON-GOING

## 2024-10-26 ASSESSMENT — PAIN SCALES - GENERAL: PAINLEVEL_OUTOF10: 4

## 2024-10-26 ASSESSMENT — PAIN - FUNCTIONAL ASSESSMENT
PAIN_FUNCTIONAL_ASSESSMENT: 0-10
PAIN_FUNCTIONAL_ASSESSMENT: ACTIVITIES ARE NOT PREVENTED

## 2024-10-26 ASSESSMENT — PAIN DESCRIPTION - DESCRIPTORS: DESCRIPTORS: ACHING

## 2024-10-26 NOTE — ED TRIAGE NOTES
Pt fell today hitting her head causing a laceration, bleeding controlled, daughter denies LOC, no other obvious injuries, Pt is alert, oriented to baseline, afebrile, breathes are equal and unlabored,

## 2024-10-27 NOTE — ED PROVIDER NOTES
[facility] ED  eMERGENCY dEPARTMENT eNCOUnter      Pt Name: Rayna Seals  MRN: [id number]  Birthdate 1940  Date of evaluation: 10/26/2024  Provider: ERICK DE JESUS MD  8:29 PM EDT     CHIEF COMPLAINT       Chief Complaint   Patient presents with    Fall     PT FELL HITTING HER HEAD CAUSING A LACERATION TO HER FOREHEAD         HISTORY OF PRESENT ILLNESS   (Location/Symptom, Timing/Onset,Context/Setting, Quality, Duration, Modifying Factors, Severity)  Note limiting factors.   Rayna Seals is a 83 y.o. female who presents to the emergency department fall and head injeury      Rayna Seals is a 83 y.o. female with medical history of dementia and CVA presented to the ED with daughter with a concern about a mechanical fall patient was trying to change the TV channel when she tripped and fell hit her head on the corner of the TV stand small laceration on the forehead denies any other injury was able to get up and walk have some knee pain but no other    The history is provided by the patient and a relative.       NursingNotes were reviewed.    REVIEW OF SYSTEMS    (2-9 systems for level 4, 10 or more for level 5)     Review of Systems   Constitutional: Negative.    HENT: Negative.     Respiratory: Negative.     Cardiovascular: Negative.    Musculoskeletal:  Positive for arthralgias.   Skin: Negative.    Psychiatric/Behavioral: Negative.         Except as noted above the remainder of the review of systems was reviewed and negative.       PAST MEDICAL HISTORY     Past Medical History:   Diagnosis Date    Cerebral artery occlusion with cerebral infarction (HCC)     Dementia (HCC)          SURGICALHISTORY       Past Surgical History:   Procedure Laterality Date    ANKLE FRACTURE SURGERY Left     CHOLECYSTECTOMY      TUBAL LIGATION           CURRENT MEDICATIONS       Discharge Medication List as of 10/26/2024  8:32 PM        CONTINUE these medications which have NOT CHANGED    Details   lisinopril

## 2024-11-15 ENCOUNTER — APPOINTMENT (OUTPATIENT)
Dept: CARDIOLOGY | Facility: HOSPITAL | Age: 84
End: 2024-11-15
Payer: MEDICARE

## 2024-11-15 ENCOUNTER — APPOINTMENT (OUTPATIENT)
Dept: RADIOLOGY | Facility: HOSPITAL | Age: 84
End: 2024-11-15
Payer: MEDICARE

## 2024-11-15 ENCOUNTER — HOSPITAL ENCOUNTER (OUTPATIENT)
Dept: CARDIOLOGY | Facility: HOSPITAL | Age: 84
Discharge: HOME | End: 2024-11-15
Payer: MEDICARE

## 2024-11-15 ENCOUNTER — HOSPITAL ENCOUNTER (EMERGENCY)
Facility: HOSPITAL | Age: 84
Discharge: HOME | End: 2024-11-15
Attending: STUDENT IN AN ORGANIZED HEALTH CARE EDUCATION/TRAINING PROGRAM
Payer: MEDICARE

## 2024-11-15 VITALS
WEIGHT: 130 LBS | BODY MASS INDEX: 20.89 KG/M2 | RESPIRATION RATE: 19 BRPM | TEMPERATURE: 97.3 F | HEART RATE: 79 BPM | OXYGEN SATURATION: 97 % | HEIGHT: 66 IN | DIASTOLIC BLOOD PRESSURE: 86 MMHG | SYSTOLIC BLOOD PRESSURE: 174 MMHG
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
## 2024-11-15 LAB
ALBUMIN SERPL BCP-MCNC: 3.3 G/DL (ref 3.4–5)
ALP SERPL-CCNC: 132 U/L (ref 33–136)
ALT SERPL W P-5'-P-CCNC: 6 U/L (ref 7–45)
ANION GAP SERPL CALC-SCNC: 9 MMOL/L (ref 10–20)
AST SERPL W P-5'-P-CCNC: 12 U/L (ref 9–39)
ATRIAL RATE: 69 BPM
BASOPHILS # BLD AUTO: 0.06 X10*3/UL (ref 0–0.1)
BASOPHILS NFR BLD AUTO: 0.7 %
BILIRUB SERPL-MCNC: 0.5 MG/DL (ref 0–1.2)
BUN SERPL-MCNC: 14 MG/DL (ref 6–23)
CALCIUM SERPL-MCNC: 9.1 MG/DL (ref 8.6–10.3)
CHLORIDE SERPL-SCNC: 102 MMOL/L (ref 98–107)
CO2 SERPL-SCNC: 33 MMOL/L (ref 21–32)
CREAT SERPL-MCNC: 0.93 MG/DL (ref 0.5–1.05)
EGFRCR SERPLBLD CKD-EPI 2021: 60 ML/MIN/1.73M*2
EOSINOPHIL # BLD AUTO: 0.46 X10*3/UL (ref 0–0.4)
EOSINOPHIL NFR BLD AUTO: 5 %
ERYTHROCYTE [DISTWIDTH] IN BLOOD BY AUTOMATED COUNT: 13.3 % (ref 11.5–14.5)
GLUCOSE SERPL-MCNC: 85 MG/DL (ref 74–99)
HCT VFR BLD AUTO: 43.9 % (ref 36–46)
HGB BLD-MCNC: 13.8 G/DL (ref 12–16)
IMM GRANULOCYTES # BLD AUTO: 0.03 X10*3/UL (ref 0–0.5)
IMM GRANULOCYTES NFR BLD AUTO: 0.3 % (ref 0–0.9)
LYMPHOCYTES # BLD AUTO: 1.07 X10*3/UL (ref 0.8–3)
LYMPHOCYTES NFR BLD AUTO: 11.7 %
MCH RBC QN AUTO: 28.5 PG (ref 26–34)
MCHC RBC AUTO-ENTMCNC: 31.4 G/DL (ref 32–36)
MCV RBC AUTO: 91 FL (ref 80–100)
MONOCYTES # BLD AUTO: 0.56 X10*3/UL (ref 0.05–0.8)
MONOCYTES NFR BLD AUTO: 6.1 %
NEUTROPHILS # BLD AUTO: 6.98 X10*3/UL (ref 1.6–5.5)
NEUTROPHILS NFR BLD AUTO: 76.2 %
NRBC BLD-RTO: 0 /100 WBCS (ref 0–0)
P AXIS: 46 DEGREES
P OFFSET: 198 MS
P ONSET: 155 MS
PLATELET # BLD AUTO: 299 X10*3/UL (ref 150–450)
POTASSIUM SERPL-SCNC: 3.8 MMOL/L (ref 3.5–5.3)
PR INTERVAL: 128 MS
PROT SERPL-MCNC: 6.6 G/DL (ref 6.4–8.2)
Q ONSET: 219 MS
QRS COUNT: 11 BEATS
QRS DURATION: 84 MS
QT INTERVAL: 412 MS
QTC CALCULATION(BAZETT): 441 MS
QTC FREDERICIA: 431 MS
R AXIS: 49 DEGREES
RBC # BLD AUTO: 4.85 X10*6/UL (ref 4–5.2)
SODIUM SERPL-SCNC: 140 MMOL/L (ref 136–145)
T AXIS: 48 DEGREES
T OFFSET: 425 MS
VENTRICULAR RATE: 69 BPM
WBC # BLD AUTO: 9.2 X10*3/UL (ref 4.4–11.3)

## 2024-11-15 PROCEDURE — 72125 CT NECK SPINE W/O DYE: CPT

## 2024-11-15 PROCEDURE — 99285 EMERGENCY DEPT VISIT HI MDM: CPT | Mod: 25

## 2024-11-15 PROCEDURE — 70450 CT HEAD/BRAIN W/O DYE: CPT | Performed by: STUDENT IN AN ORGANIZED HEALTH CARE EDUCATION/TRAINING PROGRAM

## 2024-11-15 PROCEDURE — 93005 ELECTROCARDIOGRAM TRACING: CPT

## 2024-11-15 PROCEDURE — 70450 CT HEAD/BRAIN W/O DYE: CPT

## 2024-11-15 PROCEDURE — 36415 COLL VENOUS BLD VENIPUNCTURE: CPT | Performed by: STUDENT IN AN ORGANIZED HEALTH CARE EDUCATION/TRAINING PROGRAM

## 2024-11-15 PROCEDURE — 71045 X-RAY EXAM CHEST 1 VIEW: CPT

## 2024-11-15 PROCEDURE — 70450 CT HEAD/BRAIN W/O DYE: CPT | Performed by: RADIOLOGY

## 2024-11-15 PROCEDURE — 85025 COMPLETE CBC W/AUTO DIFF WBC: CPT | Performed by: STUDENT IN AN ORGANIZED HEALTH CARE EDUCATION/TRAINING PROGRAM

## 2024-11-15 PROCEDURE — 72125 CT NECK SPINE W/O DYE: CPT | Performed by: STUDENT IN AN ORGANIZED HEALTH CARE EDUCATION/TRAINING PROGRAM

## 2024-11-15 PROCEDURE — 80053 COMPREHEN METABOLIC PANEL: CPT | Performed by: STUDENT IN AN ORGANIZED HEALTH CARE EDUCATION/TRAINING PROGRAM

## 2024-11-15 PROCEDURE — 71045 X-RAY EXAM CHEST 1 VIEW: CPT | Performed by: RADIOLOGY

## 2024-11-15 ASSESSMENT — LIFESTYLE VARIABLES
TOTAL SCORE: 0
EVER HAD A DRINK FIRST THING IN THE MORNING TO STEADY YOUR NERVES TO GET RID OF A HANGOVER: NO
EVER FELT BAD OR GUILTY ABOUT YOUR DRINKING: NO
HAVE YOU EVER FELT YOU SHOULD CUT DOWN ON YOUR DRINKING: NO
HAVE PEOPLE ANNOYED YOU BY CRITICIZING YOUR DRINKING: NO

## 2024-11-15 ASSESSMENT — PAIN SCALES - GENERAL: PAINLEVEL_OUTOF10: 4

## 2024-11-15 ASSESSMENT — COLUMBIA-SUICIDE SEVERITY RATING SCALE - C-SSRS
6. HAVE YOU EVER DONE ANYTHING, STARTED TO DO ANYTHING, OR PREPARED TO DO ANYTHING TO END YOUR LIFE?: NO
2. HAVE YOU ACTUALLY HAD ANY THOUGHTS OF KILLING YOURSELF?: NO
1. IN THE PAST MONTH, HAVE YOU WISHED YOU WERE DEAD OR WISHED YOU COULD GO TO SLEEP AND NOT WAKE UP?: NO

## 2024-11-15 ASSESSMENT — PAIN - FUNCTIONAL ASSESSMENT: PAIN_FUNCTIONAL_ASSESSMENT: 0-10

## 2024-11-15 NOTE — ED PROVIDER NOTES
HPI   Chief Complaint   Patient presents with    Fall     Per EMS, fell outside home while trying to open a neighbor's car door. Unk thinners; Unk LOC       Patient is an 84-year-old female with history of vascular dementia, remote subdural hematoma, CVA with unknown residual effects who presents for a fall.  Patient was reportedly try to get into the car outside of her neighbors house when she fell.  No confirmed coagulation use.  No LOC.  Patient did endorse some pain over her chin to nursing, but denies this to me.  Patient does not provide significant additional history regarding the fall.              Patient History   Past Medical History:   Diagnosis Date    Cataract     Cataract     bilateral    CVA (cerebral vascular accident) (Multi) 11/15/2017    left thalamic    Syncope 11/15/2011    Vascular dementia of acute onset without behavioral disturbance (Multi)      Past Surgical History:   Procedure Laterality Date    ANKLE SURGERY Left     CATARACT EXTRACTION Right     Dr. Logan    CHOLECYSTECTOMY       No family history on file.  Social History     Tobacco Use    Smoking status: Former     Current packs/day: 0.00     Types: Cigarettes     Start date: 1946     Quit date: 1954     Years since quittin.9    Smokeless tobacco: Never   Substance Use Topics    Alcohol use: Not on file    Drug use: Never       Physical Exam   ED Triage Vitals [11/15/24 1129]   Temperature Heart Rate Respirations BP   35.9 °C (96.6 °F) 71 18 (!) 193/93      Pulse Ox Temp Source Heart Rate Source Patient Position   97 % Temporal Monitor Sitting      BP Location FiO2 (%)     Right arm --       Physical Exam  Constitutional:       General: She is not in acute distress.  HENT:      Head: Normocephalic.   Eyes:      Extraocular Movements: Extraocular movements intact.      Conjunctiva/sclera: Conjunctivae normal.      Pupils: Pupils are equal, round, and reactive to light.   Cardiovascular:      Rate and Rhythm: Normal  rate and regular rhythm.      Pulses: Normal pulses.      Heart sounds: Normal heart sounds.   Pulmonary:      Effort: Pulmonary effort is normal.      Breath sounds: Normal breath sounds.   Abdominal:      General: There is no distension.      Palpations: Abdomen is soft. There is no mass.      Tenderness: There is no abdominal tenderness. There is no guarding.   Musculoskeletal:         General: No deformity.      Right lower leg: No edema.      Left lower leg: No edema.      Comments: C-collar in place.  No C/T/L-spine tenderness.   Skin:     General: Skin is warm and dry.      Findings: No lesion or rash.   Neurological:      General: No focal deficit present.      Mental Status: She is alert and oriented to person, place, and time. Mental status is at baseline.      Cranial Nerves: No cranial nerve deficit.      Sensory: No sensory deficit.      Motor: No weakness.   Psychiatric:         Mood and Affect: Mood normal.           ED Course & MDM   Diagnoses as of 11/16/24 1013   Fall, initial encounter                 No data recorded     [place] Coma Scale Score: 15 (11/15/24 1127 : Marjorie Fong RN)                           Medical Decision Making  EKG on my interpretation: Rate 69, rhythm regular, axis normal, , QRS 84, QTc 441, T waves: Unremarkable, ST segments: No elevations or depressions, interpretation: Normal sinus rhythm, no STEMI    Patient is an 84-year-old female with above-stated past medical history presents for a fall.  Patient's EKG is nonischemic, low suspicion for ACS.  CBC and CMP are grossly unremarkable.  Chest x-ray did not show signs of pneumonia or pneumothorax on my review, this was confirmed radiology.  CT head showed a right dural convexity possibly from a previous subdural hematoma or new bleed.  CT C-spine was negative for acute findings, c-collar was cleared in the emergency department.  I discussed the patient's case with Dr. Hammond of [facility] trauma who reviewed the  case with the [facility] neurosurgery team and they recommended a 6-hour stability scan.  If scan shows no changes, patient can be discharged home as the findings are likely chronic.  Patient's repeat scan showed no change.  I reexamined her and she continues to have no focal deficits on my exam.  She is pleasantly demented which is her baseline.  Patient's daughter is present and feels comfortable returning home with her.  I believe this is reasonable.  I discussed return precautions with the patient's daughter, she stated understanding agreement.  I do not believe the patient requires admission.  Low suspicion for elder abuse.  Patient was discharged home in stable condition in the care of her daughter.    Disclaimer: This note was dictated using speech recognition software. Minor errors in transcription may be present. Please call if questions.     Christopher Paige MD  [facility]  Contact on [employer]        Problems Addressed:  Fall, initial encounter: acute illness or injury    Amount and/or Complexity of Data Reviewed  Labs: ordered.  Radiology: ordered and independent interpretation performed.  ECG/medicine tests: ordered and independent interpretation performed.        Procedure  Procedures     Christopher Paige MD  11/16/24 6233

## 2024-11-16 LAB
ATRIAL RATE: 300 BPM
P OFFSET: 184 MS
P ONSET: 157 MS
Q ONSET: 219 MS
QRS COUNT: 12 BEATS
QRS DURATION: 82 MS
QT INTERVAL: 414 MS
QTC CALCULATION(BAZETT): 447 MS
QTC FREDERICIA: 436 MS
R AXIS: 53 DEGREES
T AXIS: 53 DEGREES
T OFFSET: 426 MS
VENTRICULAR RATE: 70 BPM

## 2025-01-13 ENCOUNTER — HOSPITAL ENCOUNTER (INPATIENT)
Age: 85
LOS: 1 days | Discharge: NURSING, REHAB OR LONG-TERM CARE | DRG: 884 | End: 2025-01-16
Attending: EMERGENCY MEDICINE | Admitting: STUDENT IN AN ORGANIZED HEALTH CARE EDUCATION/TRAINING PROGRAM
Payer: MEDICARE

## 2025-01-13 ENCOUNTER — APPOINTMENT (OUTPATIENT)
Dept: CT IMAGING | Age: 85
DRG: 884 | End: 2025-01-13
Attending: EMERGENCY MEDICINE
Payer: MEDICARE

## 2025-01-13 ENCOUNTER — APPOINTMENT (OUTPATIENT)
Dept: GENERAL RADIOLOGY | Age: 85
DRG: 884 | End: 2025-01-13
Payer: MEDICARE

## 2025-01-13 DIAGNOSIS — Z86.59 HISTORY OF DEMENTIA: Primary | ICD-10-CM
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
## 2025-01-13 DIAGNOSIS — R53.1 GENERAL WEAKNESS: ICD-10-CM

## 2025-01-13 DIAGNOSIS — R26.9 GAIT DISTURBANCE: ICD-10-CM
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
## 2025-01-13 LAB
ALBUMIN SERPL-MCNC: 3.2 G/DL (ref 3.5–4.6)
ALP SERPL-CCNC: 125 U/L (ref 40–130)
ALT SERPL-CCNC: 34 U/L (ref 0–33)
AMORPH SED URNS QL MICRO: NORMAL
ANION GAP SERPL CALCULATED.3IONS-SCNC: 11 MEQ/L (ref 9–15)
AST SERPL-CCNC: 89 U/L (ref 0–35)
BACTERIA URNS QL MICRO: NEGATIVE /HPF
BASOPHILS # BLD: 0 K/UL (ref 0–0.2)
BASOPHILS NFR BLD: 0.3 %
BILIRUB SERPL-MCNC: 0.6 MG/DL (ref 0.2–0.7)
BILIRUB UR QL STRIP: ABNORMAL
BUN SERPL-MCNC: 16 MG/DL (ref 8–23)
CALCIUM SERPL-MCNC: 8.8 MG/DL (ref 8.5–9.9)
CHLORIDE SERPL-SCNC: 101 MEQ/L (ref 95–107)
CLARITY UR: ABNORMAL
CO2 SERPL-SCNC: 26 MEQ/L (ref 20–31)
COLOR UR: ABNORMAL
CREAT SERPL-MCNC: 0.94 MG/DL (ref 0.5–0.9)
EKG ATRIAL RATE: 79 BPM
EKG P AXIS: 72 DEGREES
EKG P-R INTERVAL: 140 MS
EKG Q-T INTERVAL: 398 MS
EKG QRS DURATION: 80 MS
EKG QTC CALCULATION (BAZETT): 456 MS
EKG R AXIS: 39 DEGREES
EKG T AXIS: 24 DEGREES
EKG VENTRICULAR RATE: 79 BPM
EOSINOPHIL # BLD: 0 K/UL (ref 0–0.7)
EOSINOPHIL NFR BLD: 0.3 %
EPI CELLS #/AREA URNS AUTO: NORMAL /HPF (ref 0–5)
ERYTHROCYTE [DISTWIDTH] IN BLOOD BY AUTOMATED COUNT: 14 % (ref 11.5–14.5)
GLOBULIN SER CALC-MCNC: 3.5 G/DL (ref 2.3–3.5)
GLUCOSE SERPL-MCNC: 101 MG/DL (ref 70–99)
GLUCOSE UR STRIP-MCNC: NEGATIVE MG/DL
HCT VFR BLD AUTO: 46.8 % (ref 37–47)
HGB BLD-MCNC: 15 G/DL (ref 12–16)
HGB UR QL STRIP: ABNORMAL
HYALINE CASTS #/AREA URNS AUTO: NORMAL /HPF (ref 0–5)
KETONES UR STRIP-MCNC: 15 MG/DL
LEUKOCYTE ESTERASE UR QL STRIP: ABNORMAL
LYMPHOCYTES # BLD: 0.7 K/UL (ref 1–4.8)
LYMPHOCYTES NFR BLD: 5.5 %
MAGNESIUM SERPL-MCNC: 2.3 MG/DL (ref 1.7–2.4)
MCH RBC QN AUTO: 27.9 PG (ref 27–31.3)
MCHC RBC AUTO-ENTMCNC: 32.1 % (ref 33–37)
MCV RBC AUTO: 87.2 FL (ref 79.4–94.8)
MONOCYTES # BLD: 0.8 K/UL (ref 0.2–0.8)
MONOCYTES NFR BLD: 6.7 %
NEUTROPHILS # BLD: 10.2 K/UL (ref 1.4–6.5)
NEUTS SEG NFR BLD: 86.9 %
NITRITE UR QL STRIP: NEGATIVE
PH UR STRIP: 5.5 [PH] (ref 5–9)
PLATELET # BLD AUTO: 281 K/UL (ref 130–400)
POTASSIUM SERPL-SCNC: 4.6 MEQ/L (ref 3.4–4.9)
PROT SERPL-MCNC: 6.7 G/DL (ref 6.3–8)
PROT UR STRIP-MCNC: 30 MG/DL
RBC # BLD AUTO: 5.37 M/UL (ref 4.2–5.4)
RBC #/AREA URNS HPF: NORMAL /HPF (ref 0–2)
REASON FOR REJECTION: NORMAL
REJECTED TEST: NORMAL
SODIUM SERPL-SCNC: 138 MEQ/L (ref 135–144)
SP GR UR STRIP: 1.03 (ref 1–1.03)
TROPONIN, HIGH SENSITIVITY: 16 NG/L (ref 0–19)
TSH SERPL-MCNC: 0.96 UIU/ML (ref 0.44–3.86)
URINE REFLEX TO CULTURE: ABNORMAL
UROBILINOGEN UR STRIP-ACNC: 1 E.U./DL
WBC # BLD AUTO: 11.7 K/UL (ref 4.8–10.8)
WBC #/AREA URNS AUTO: NORMAL /HPF (ref 0–5)

## 2025-01-13 PROCEDURE — 99285 EMERGENCY DEPT VISIT HI MDM: CPT

## 2025-01-13 PROCEDURE — 36415 COLL VENOUS BLD VENIPUNCTURE: CPT

## 2025-01-13 PROCEDURE — 93005 ELECTROCARDIOGRAM TRACING: CPT | Performed by: EMERGENCY MEDICINE

## 2025-01-13 PROCEDURE — 73502 X-RAY EXAM HIP UNI 2-3 VIEWS: CPT

## 2025-01-13 PROCEDURE — 83735 ASSAY OF MAGNESIUM: CPT

## 2025-01-13 PROCEDURE — G0378 HOSPITAL OBSERVATION PER HR: HCPCS

## 2025-01-13 PROCEDURE — 84443 ASSAY THYROID STIM HORMONE: CPT

## 2025-01-13 PROCEDURE — 80053 COMPREHEN METABOLIC PANEL: CPT

## 2025-01-13 PROCEDURE — 96372 THER/PROPH/DIAG INJ SC/IM: CPT

## 2025-01-13 PROCEDURE — 6370000000 HC RX 637 (ALT 250 FOR IP): Performed by: STUDENT IN AN ORGANIZED HEALTH CARE EDUCATION/TRAINING PROGRAM

## 2025-01-13 PROCEDURE — 84484 ASSAY OF TROPONIN QUANT: CPT

## 2025-01-13 PROCEDURE — 85025 COMPLETE CBC W/AUTO DIFF WBC: CPT

## 2025-01-13 PROCEDURE — 2500000003 HC RX 250 WO HCPCS: Performed by: STUDENT IN AN ORGANIZED HEALTH CARE EDUCATION/TRAINING PROGRAM

## 2025-01-13 PROCEDURE — 97166 OT EVAL MOD COMPLEX 45 MIN: CPT

## 2025-01-13 PROCEDURE — 97162 PT EVAL MOD COMPLEX 30 MIN: CPT

## 2025-01-13 PROCEDURE — 93010 ELECTROCARDIOGRAM REPORT: CPT | Performed by: INTERNAL MEDICINE

## 2025-01-13 PROCEDURE — 81001 URINALYSIS AUTO W/SCOPE: CPT

## 2025-01-13 PROCEDURE — 6360000002 HC RX W HCPCS: Performed by: STUDENT IN AN ORGANIZED HEALTH CARE EDUCATION/TRAINING PROGRAM

## 2025-01-13 PROCEDURE — 73552 X-RAY EXAM OF FEMUR 2/>: CPT

## 2025-01-13 PROCEDURE — 70450 CT HEAD/BRAIN W/O DYE: CPT

## 2025-01-13 RX ORDER — SODIUM CHLORIDE 0.9 % (FLUSH) 0.9 %
5-40 SYRINGE (ML) INJECTION PRN
Status: DISCONTINUED | OUTPATIENT
Start: 2025-01-13 | End: 2025-01-16 | Stop reason: HOSPADM

## 2025-01-13 RX ORDER — SODIUM CHLORIDE 0.9 % (FLUSH) 0.9 %
5-40 SYRINGE (ML) INJECTION EVERY 12 HOURS SCHEDULED
Status: DISCONTINUED | OUTPATIENT
Start: 2025-01-13 | End: 2025-01-16 | Stop reason: HOSPADM

## 2025-01-13 RX ORDER — ENOXAPARIN SODIUM 100 MG/ML
40 INJECTION SUBCUTANEOUS DAILY
Status: DISCONTINUED | OUTPATIENT
Start: 2025-01-13 | End: 2025-01-16 | Stop reason: HOSPADM

## 2025-01-13 RX ORDER — ACETAMINOPHEN 325 MG/1
650 TABLET ORAL EVERY 6 HOURS PRN
Status: DISCONTINUED | OUTPATIENT
Start: 2025-01-13 | End: 2025-01-16 | Stop reason: HOSPADM

## 2025-01-13 RX ORDER — ONDANSETRON 2 MG/ML
4 INJECTION INTRAMUSCULAR; INTRAVENOUS EVERY 6 HOURS PRN
Status: DISCONTINUED | OUTPATIENT
Start: 2025-01-13 | End: 2025-01-16 | Stop reason: HOSPADM

## 2025-01-13 RX ORDER — POLYETHYLENE GLYCOL 3350 17 G/17G
17 POWDER, FOR SOLUTION ORAL DAILY PRN
Status: DISCONTINUED | OUTPATIENT
Start: 2025-01-13 | End: 2025-01-16 | Stop reason: HOSPADM

## 2025-01-13 RX ORDER — LEVETIRACETAM 500 MG/1
500 TABLET ORAL 2 TIMES DAILY
Status: DISCONTINUED | OUTPATIENT
Start: 2025-01-13 | End: 2025-01-16 | Stop reason: HOSPADM

## 2025-01-13 RX ORDER — SODIUM CHLORIDE 9 MG/ML
INJECTION, SOLUTION INTRAVENOUS PRN
Status: DISCONTINUED | OUTPATIENT
Start: 2025-01-13 | End: 2025-01-16 | Stop reason: HOSPADM

## 2025-01-13 RX ORDER — POTASSIUM CHLORIDE 1500 MG/1
40 TABLET, EXTENDED RELEASE ORAL PRN
Status: DISCONTINUED | OUTPATIENT
Start: 2025-01-13 | End: 2025-01-16 | Stop reason: HOSPADM

## 2025-01-13 RX ORDER — ONDANSETRON 4 MG/1
4 TABLET, ORALLY DISINTEGRATING ORAL EVERY 8 HOURS PRN
Status: DISCONTINUED | OUTPATIENT
Start: 2025-01-13 | End: 2025-01-16 | Stop reason: HOSPADM

## 2025-01-13 RX ORDER — LEVETIRACETAM 500 MG/1
500 TABLET ORAL 2 TIMES DAILY
COMMUNITY

## 2025-01-13 RX ORDER — POTASSIUM CHLORIDE 7.45 MG/ML
10 INJECTION INTRAVENOUS PRN
Status: DISCONTINUED | OUTPATIENT
Start: 2025-01-13 | End: 2025-01-16 | Stop reason: HOSPADM

## 2025-01-13 RX ORDER — SERTRALINE HYDROCHLORIDE 25 MG/1
25 TABLET, FILM COATED ORAL DAILY
Status: DISCONTINUED | OUTPATIENT
Start: 2025-01-13 | End: 2025-01-16 | Stop reason: HOSPADM

## 2025-01-13 RX ORDER — LOSARTAN POTASSIUM 25 MG/1
25 TABLET ORAL DAILY
Status: DISCONTINUED | OUTPATIENT
Start: 2025-01-13 | End: 2025-01-16 | Stop reason: HOSPADM

## 2025-01-13 RX ORDER — MAGNESIUM SULFATE IN WATER 40 MG/ML
2000 INJECTION, SOLUTION INTRAVENOUS PRN
Status: DISCONTINUED | OUTPATIENT
Start: 2025-01-13 | End: 2025-01-16 | Stop reason: HOSPADM

## 2025-01-13 RX ORDER — ACETAMINOPHEN 650 MG/1
650 SUPPOSITORY RECTAL EVERY 6 HOURS PRN
Status: DISCONTINUED | OUTPATIENT
Start: 2025-01-13 | End: 2025-01-16 | Stop reason: HOSPADM

## 2025-01-13 RX ADMIN — LOSARTAN POTASSIUM 25 MG: 50 TABLET, FILM COATED ORAL at 16:48

## 2025-01-13 RX ADMIN — SERTRALINE HYDROCHLORIDE 25 MG: 25 TABLET ORAL at 18:18

## 2025-01-13 RX ADMIN — SODIUM CHLORIDE, PRESERVATIVE FREE 10 ML: 5 INJECTION INTRAVENOUS at 21:34

## 2025-01-13 RX ADMIN — LEVETIRACETAM 500 MG: 500 TABLET, FILM COATED ORAL at 21:35

## 2025-01-13 RX ADMIN — ENOXAPARIN SODIUM 40 MG: 100 INJECTION SUBCUTANEOUS at 15:36

## 2025-01-13 ASSESSMENT — PAIN - FUNCTIONAL ASSESSMENT: PAIN_FUNCTIONAL_ASSESSMENT: 0-10

## 2025-01-13 ASSESSMENT — LIFESTYLE VARIABLES
HOW OFTEN DO YOU HAVE A DRINK CONTAINING ALCOHOL: NEVER
HOW MANY STANDARD DRINKS CONTAINING ALCOHOL DO YOU HAVE ON A TYPICAL DAY: PATIENT DOES NOT DRINK

## 2025-01-13 ASSESSMENT — ENCOUNTER SYMPTOMS
SORE THROAT: 0
NAUSEA: 0
VOMITING: 0
EYE PAIN: 0
CHEST TIGHTNESS: 0
ABDOMINAL PAIN: 0
SHORTNESS OF BREATH: 0

## 2025-01-13 ASSESSMENT — PAIN DESCRIPTION - ORIENTATION: ORIENTATION: RIGHT

## 2025-01-13 ASSESSMENT — PAIN DESCRIPTION - LOCATION: LOCATION: LEG

## 2025-01-13 ASSESSMENT — PAIN SCALES - GENERAL: PAINLEVEL_OUTOF10: 3

## 2025-01-13 ASSESSMENT — PAIN DESCRIPTION - DESCRIPTORS: DESCRIPTORS: ACHING

## 2025-01-13 NOTE — CARE COORDINATION
I called dtkirsty Spencer at [phone] and had to leave [facility] for her to call me back. Pt from home w/dtr and therapy saw in ER and they rec. SNF. Pt in obs at present and will need precert.

## 2025-01-13 NOTE — PROGRESS NOTES
[facility] OCCUPATIONAL THERAPY EVALUATION - ACUTE     NAME: Rayna Seals  : 1940 (84 y.o.)  MRN: [id number]  CODE STATUS: Prior  Room: 10/10    Date of Service: 2025    Patient Diagnosis(es): No admission diagnoses are documented for this encounter.   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        Past Medical History:   Diagnosis Date    Cerebral artery occlusion with cerebral infarction (HCC)     Dementia (HCC)      Past Surgical History:   Procedure Laterality Date    ANKLE FRACTURE SURGERY Left     CHOLECYSTECTOMY      TUBAL LIGATION          Restrictions  Restrictions/Precautions: Fall Risk  Activity Level: Up as Tolerated   Up as Tolerated             Safety Devices: Safety Devices  Type of Devices: All fall risk precautions in place;Call light within reach;Left in bed     Patient's date of birth confirmed: Yes    General:  Chart Reviewed: Yes  Patient assessed for rehabilitation services?: Yes    Subjective  Subjective: \"I'm waiting for my daughter\"       Pain at start of treatment: No    Pain at end of treatment: No    Location: Pt limps on R leg when ambulating, but denies pain and unable to describe  Description:   Nursing notified: Declined  RN:   Intervention: Repositioned    Prior Level of Function:  Social/Functional History  Lives With: Daughter  Type of Home: House  Additional Comments: Daughter not present, pt inconsistent historian and unable to provide PLOF or home set up. Per physician, pt from home with daughter, ambulatory with WW with assist    OBJECTIVE:     Orientation Status:  Orientation  Overall Orientation Status: Impaired  Orientation Level: Oriented to person    Observation:  Observation/Palpation  Posture: Fair  Observation: No acute distress noted. Pt pleasantly confused.    Cognition Status:  Cognition  Overall Cognitive Status: Exceptions  Arousal/Alertness: Appears intact  Following Commands:  toileting tasks  - Improve B UE strength and endurance to 3+/5 in order to participate in self-care activities as projected.  - Access appropriate D/C site with as few architectural barriers as possible.  - Sequence self-care tasks with 25% verbal cues for sequencing    Patient Goal: Patient goals : \"I want to get out of here\"     Discussed and agreed upon: Yes Comments:       Therapy Time:   Individual   Time In 1400   Time Out 1408   Minutes 8     Eval: 8 minutes     Electronically signed by:    KRAIG Lion/L,   1/13/2025, 2:36 PM

## 2025-01-13 NOTE — ED NOTES
Pt presents to ER from home via EMS for a mechanical fall that occurred yesterday per daughter who she lives with. Patient does have some right thigh tenderness about 3/10 pain and stated during assessment, \"my leg always hurts me\". Pt has a history of dementia and per report from squad per daughter she is baseline. Pt is A&Ox2, warm and dry at this time with vitals stable.

## 2025-01-13 NOTE — ED PROVIDER NOTES
[facility]ARE UN[facility]  EMERGENCY DEPARTMENT ENCOUNTER      Pt Name: Rayna Seals  MRN: [id number]  Birthdate 1940  Date of evaluation: 1/13/2025  Provider: Tiffanie Haddad DO    CHIEF COMPLAINT       Chief Complaint   Patient presents with    Leg Pain         HISTORY OF PRESENT ILLNESS   (Location/Symptom, Timing/Onset, Context/Setting, Quality, Duration, Modifying Factors, Severity)  Note limiting factors.   Rayna Seals is a 84 y.o. female who presents to the emergency department .  Patient with dementia had a fall yesterday.  Complaining of right thigh pain.    HPI    Nursing Notes were reviewed.    REVIEW OF SYSTEMS    (2-9 systems for level 4, 10 or more for level 5)     Review of Systems   Constitutional:  Negative for activity change, appetite change and fatigue.   HENT:  Negative for congestion and sore throat.    Eyes:  Negative for pain and visual disturbance.   Respiratory:  Negative for chest tightness and shortness of breath.    Cardiovascular:  Negative for chest pain.   Gastrointestinal:  Negative for abdominal pain, nausea and vomiting.   Endocrine: Negative for polydipsia.   Genitourinary:  Negative for flank pain and urgency.   Musculoskeletal:  Positive for myalgias. Negative for gait problem and neck stiffness.   Skin:  Negative for rash.   Neurological:  Negative for weakness, light-headedness and headaches.   Psychiatric/Behavioral:  Negative for confusion and sleep disturbance.        Except as noted above the remainder of the review of systems was reviewed and negative.       PAST MEDICAL HISTORY     Past Medical History:   Diagnosis Date    Cerebral artery occlusion with cerebral infarction (HCC)     Dementia (HCC)          SURGICAL HISTORY       Past Surgical History:   Procedure Laterality Date    ANKLE FRACTURE SURGERY Left     CHOLECYSTECTOMY      TUBAL LIGATION           CURRENT MEDICATIONS       Current Discharge Medication List        CONTINUE these medications which have NOT  atrophy and Theresa   ventricular leukomalacia.   2. If clinical concern warrants, consider MRI to evaluate for acute ischemia.   3. No significant interval change.         XR FEMUR RIGHT (MIN 2 VIEWS)   Final Result   Degenerative changes involving the right femur with no evidence of acute bony   abnormality.         XR HIP RIGHT (2-3 VIEWS)   Final Result   No acute fracture visualized radiographically.           CT brain no acute pathology    ED BEDSIDE ULTRASOUND:   Performed by ED Physician - none    LABS:  Labs Reviewed   CBC WITH AUTO DIFFERENTIAL - Abnormal; Notable for the following components:       Result Value    WBC 11.7 (*)     MCHC 32.1 (*)     Neutrophils Absolute 10.2 (*)     Lymphocytes Absolute 0.7 (*)     All other components within normal limits   URINALYSIS WITH REFLEX TO CULTURE - Abnormal; Notable for the following components:    Color, UA DARK YELLOW (*)     Clarity, UA TURBID (*)     Bilirubin, Urine MODERATE (*)     Ketones, Urine 15 (*)     Blood, Urine TRACE (*)     Protein, UA 30 (*)     Leukocyte Esterase, Urine TRACE (*)     All other components within normal limits   COMPREHENSIVE METABOLIC PANEL - Abnormal; Notable for the following components:    Glucose 101 (*)     Creatinine 0.94 (*)     Est, Glom Filt Rate 59.4 (*)     Albumin 3.2 (*)     ALT 34 (*)     AST 89 (*)     All other components within normal limits   BASIC METABOLIC PANEL W/ REFLEX TO MG FOR LOW K - Abnormal; Notable for the following components:    Anion Gap 8 (*)     Glucose 105 (*)     Creatinine 1.10 (*)     Est, Glom Filt Rate 49.2 (*)     All other components within normal limits   CBC WITH AUTO DIFFERENTIAL - Abnormal; Notable for the following components:    WBC 12.8 (*)     MCHC 32.1 (*)     Neutrophils Absolute 10.4 (*)     Monocytes Absolute 1.1 (*)     All other components within normal limits   TROPONIN   TSH   SPECIMEN REJECTION   MAGNESIUM   MICROSCOPIC URINALYSIS       All other labs were within normal

## 2025-01-13 NOTE — PLAN OF CARE
See OT evaluation for all goals and OT POC. Electronically signed by Tiff Estrella OTR/L on 1/13/2025 at 2:41 PM

## 2025-01-13 NOTE — PROGRESS NOTES
1630: Admission completed with patients daughter Sully [phone]. Per daughter patient only takes 2 medications. Patient HTN with BP 190s. Secure message sent to Dr. Mercedes.

## 2025-01-13 NOTE — ACP (ADVANCE CARE PLANNING)
Advance Care Planning   Healthcare Decision Maker:    Primary Decision Maker: COCO RUIZ - Child - [phone]    Secondary Decision Maker: Tanesha Mann - Child - [phone]    Click here to complete Healthcare Decision Makers including selection of the Healthcare Decision Maker Relationship (ie \"Primary\").          Info from dtkirsty Spencer

## 2025-01-13 NOTE — PROGRESS NOTES
Physical Therapy Med Surg Initial Assessment  Facility/Department: [facility] EMERGENCY DEPARTMENT  Room: 10/10       NAME: Rayna Seals  : 1940 (84 y.o.)  MRN: [id number]  CODE STATUS: Prior    Date of Service: 2025    Patient Diagnosis(es): No admission diagnoses are documented for this encounter.   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  General  General Comments: Pt resting in bed - agreeable to PT evalation    Restrictions:  Restrictions/Precautions: Fall Risk  Activity Level: Up as Tolerated     SUBJECTIVE:   Subjective: \"I'm beautiful Rayna.\"    Pain  Pain  Pre-Pain: 0  Post-Pain: 0 (pt did wince notably during movement of R LE)       Prior Level of Function:  Social/Functional History  Lives With: Daughter  Type of Home: House  Additional Comments: Daughter not present, pt inconsistent historian and unable to provide PLOF or home set up. Per physician, pt from home with daughter, ambulatory with WW with assist    OBJECTIVE:   Vision  Vision:  (no glasses present)  Hearing: Exceptions to WFL  Hearing Exceptions: Hard of hearing/hearing concerns    Cognition:  Overall Orientation Status: Impaired  Orientation Level: Oriented to person  Follows Commands:  (requires repetition)  Overall Cognitive Status: Exceptions  Cognition Comment: Poor sequencing and safety.    Observation/Palpation  Observation: No acute distress noted. Pt pleasantly confused.    ROM:  AROM: Generally  decreased, functional  PROM: Within functional limits    Strength:  Strength: Generally decreased, functional (unable to follow for formal MMT)    Neuro:  Balance  Sitting - Static: Good;-  Sitting - Dynamic: Fair  Standing - Static: Poor  Standing - Dynamic: Poor                      Tone: Normal  Coordination: Generally decreased, functional    Sensation: Intact    Bed mobility  Rolling to Left: Moderate assistance  Supine to Sit: Moderate assistance  Sit to Supine: Moderate assistance  Bed Mobility Comments: Hand over hand assist provided for safety    Transfers  Sit to Stand: Moderate Assistance  Stand to Sit: Moderate Assistance  Comment: Pt requires assist to steady and lift during transitions. Decreased safety awareness noted.    Ambulation  Surface: Level tile  Device: Rolling Walker  Assistance: Moderate assistance;Maximum assistance  Quality of Gait: Pt with shuffling steps. Heavy bracing on WW, however therapist providing additional support during R SLS. Pt with minimal weight acceptance R LE. Decreased safety awareness.  Distance: 4 steps forward/retro                   Activity Tolerance  Activity Tolerance: Treatment limited secondary to decreased cognition    Patient Education  Education Given To: Patient  Education Provided: Role of Therapy;Plan of Care  Education Method: Verbal  Education Outcome: Verbalized understanding;Continued education needed       ASSESSMENT:   Body Structures, Functions, Activity Limitations Requiring Skilled Therapeutic Intervention: Decreased functional mobility ;Decreased ADL status;Decreased tolerance to work activity;Decreased strength;Decreased endurance;Decreased balance;Decreased coordination;Increased pain;Decreased safe awareness  Decision Making: Medium Complexity  History: High  Exam: Med  Clinical Presentation: High    Therapy Prognosis: Good;Fair  Barriers to Learning: cog/memory         DISCHARGE RECOMMENDATIONS:  Discharge Recommendations: Continue to assess

## 2025-01-13 NOTE — CARE COORDINATION
Case Management Assessment  Initial Evaluation    Date/Time of Evaluation: 1/13/2025 4:45 PM  Assessment Completed by: Joanne Delgado    If patient is discharged prior to next notation, then this note serves as note for discharge by case management.    Patient Name: Rayna Seals                   YOB: 1940  Diagnosis: Gait disturbance [R26.9]  General weakness [R53.1]  Fall, initial encounter [W19.XXXA]  Fall at home, initial encounter [W19.XXXA, Y92.009]  History of dementia [Z86.59]                   Date / Time: 1/13/2025  8:34 AM    Patient Admission Status: Observation   Readmission Risk (Low < 19, Mod (19-27), High > 27): No data recorded  Current PCP: Britni Davenport MD  PCP verified by CM? Yes    Chart Reviewed: Yes      History Provided by: Child/Family  Patient Orientation: Other (see comment) (Info from dtr)    Patient Cognition: Other (see comment) (Info from dtr)    Hospitalization in the last 30 days (Readmission):  No    If yes, Readmission Assessment in  Navigator will be completed.    Advance Directives:      Code Status: Full Code   Patient's Primary Decision Maker is: Legal Next of Kin    Primary Decision Maker: COCO SEALS  Child - [phone]    Secondary Decision Maker: Tanesha Mann  Child - [phone]    Discharge Planning:    Patient lives with:   Type of Home:    Primary Care Giver: Family  Patient Support Systems include: Children, Family Members   Current Financial resources:    Current community resources:    Current services prior to admission:              Current DME:              Type of Home Care services:       ADLS  Prior functional level: Assistance with the following:, Bathing, Dressing, Toileting, Cooking, Housework, Shopping, Mobility (Dtr states she is always supervised and helped.)  Current functional level: Other (see comment) (See PT/OT evals)    PT AM-PAC: 11 /24  OT AM-PAC: 12 /24    Family can provide assistance at DC: Yes  Would you like Case

## 2025-01-13 NOTE — H&P
HISTORY AND PHYSICAL             Date: 1/13/2025        Patient Name: Rayna Seals     YOB: 1940      Age:  84 y.o.    Chief Complaint     Chief Complaint   Patient presents with    Leg Pain          History Obtained From   patient, electronic medical record    History of Present Illness   Patient is a 84-year-old who presents for complaints of right hip pain status post fall.  Patient is with dementia alert and oriented to self.  She does report right hip pain and tenderness but also stating she was trying to go to work, she needs to be at work.  Per report patient status post fall yesterday.  Uncertain of how patient fell.  CT of the head negative for any acute intercranial abnormalities or acute ischemia x-ray of the right femur noted for degenerative changes no evidence of acute bony abnormality right femur negative for fracture patient has a past medical history of CVA and dementia    Past Medical History     Past Medical History:   Diagnosis Date    Cerebral artery occlusion with cerebral infarction (HCC)     Dementia (HCC)         Past Surgical History     Past Surgical History:   Procedure Laterality Date    ANKLE FRACTURE SURGERY Left     CHOLECYSTECTOMY      TUBAL LIGATION          Medications Prior to Admission     Prior to Admission medications    Medication Sig Start Date End Date Taking? Authorizing Provider   lisinopril (PRINIVIL;ZESTRIL) 5 MG tablet Take 1 tablet by mouth daily 1/11/23   Chikis Dickson DO   sertraline (ZOLOFT) 50 MG tablet Take 25 mg by mouth daily 1/6/23   ProviderJeremy MD   Multiple Vitamins-Minerals (MULTIVITAMIN ADULT) CHEW Take 1 tablet by mouth daily    ProviderJeremy MD   melatonin 3 MG TABS tablet Take 3 mg by mouth daily Over counter    ProviderJeremy MD   betamethasone dipropionate (DIPROLENE) 0.05 % ointment Apply topically 2 times daily. 5/15/21   Anthony Alva PA-C   lidocaine (XYLOCAINE) 5 % ointment Apply topically as needed.

## 2025-01-13 NOTE — CARE COORDINATION
Case discussed with Dr. Haddad. PT/OT here for evals. If SNF recommendations will need admission for precert.    Electronically signed by Maribel Mays RN, BSN on 1/13/2025 at 2:08 PM  .

## 2025-01-13 NOTE — CARE COORDINATION
Spoke with Prashant from PT/OT recommendations are SNF. Relayed to Dr. Haddad.    Electronically signed by Maribel Mays, RN, BSN on 1/13/2025 at 2:14 PM

## 2025-01-14 PROBLEM — E44.0 MODERATE PROTEIN-CALORIE MALNUTRITION (HCC): Status: ACTIVE | Noted: 2025-01-14

## 2025-01-14 LAB
ANION GAP SERPL CALCULATED.3IONS-SCNC: 8 MEQ/L (ref 9–15)
BASOPHILS # BLD: 0.1 K/UL (ref 0–0.2)
BASOPHILS NFR BLD: 0.4 %
BUN SERPL-MCNC: 22 MG/DL (ref 8–23)
CALCIUM SERPL-MCNC: 8.5 MG/DL (ref 8.5–9.9)
CHLORIDE SERPL-SCNC: 100 MEQ/L (ref 95–107)
CO2 SERPL-SCNC: 30 MEQ/L (ref 20–31)
CREAT SERPL-MCNC: 1.1 MG/DL (ref 0.5–0.9)
EOSINOPHIL # BLD: 0.1 K/UL (ref 0–0.7)
EOSINOPHIL NFR BLD: 1 %
ERYTHROCYTE [DISTWIDTH] IN BLOOD BY AUTOMATED COUNT: 14 % (ref 11.5–14.5)
GLUCOSE SERPL-MCNC: 105 MG/DL (ref 70–99)
HCT VFR BLD AUTO: 40.2 % (ref 37–47)
HGB BLD-MCNC: 12.9 G/DL (ref 12–16)
LYMPHOCYTES # BLD: 1 K/UL (ref 1–4.8)
LYMPHOCYTES NFR BLD: 8.1 %
MCH RBC QN AUTO: 28.1 PG (ref 27–31.3)
MCHC RBC AUTO-ENTMCNC: 32.1 % (ref 33–37)
MCV RBC AUTO: 87.6 FL (ref 79.4–94.8)
MONOCYTES # BLD: 1.1 K/UL (ref 0.2–0.8)
MONOCYTES NFR BLD: 8.5 %
NEUTROPHILS # BLD: 10.4 K/UL (ref 1.4–6.5)
NEUTS SEG NFR BLD: 81.7 %
PLATELET # BLD AUTO: 298 K/UL (ref 130–400)
POTASSIUM SERPL-SCNC: 3.9 MEQ/L (ref 3.4–4.9)
RBC # BLD AUTO: 4.59 M/UL (ref 4.2–5.4)
SODIUM SERPL-SCNC: 138 MEQ/L (ref 135–144)
WBC # BLD AUTO: 12.8 K/UL (ref 4.8–10.8)

## 2025-01-14 PROCEDURE — 80048 BASIC METABOLIC PNL TOTAL CA: CPT

## 2025-01-14 PROCEDURE — 2580000003 HC RX 258: Performed by: STUDENT IN AN ORGANIZED HEALTH CARE EDUCATION/TRAINING PROGRAM

## 2025-01-14 PROCEDURE — 96372 THER/PROPH/DIAG INJ SC/IM: CPT

## 2025-01-14 PROCEDURE — 6360000002 HC RX W HCPCS: Performed by: STUDENT IN AN ORGANIZED HEALTH CARE EDUCATION/TRAINING PROGRAM

## 2025-01-14 PROCEDURE — 6370000000 HC RX 637 (ALT 250 FOR IP): Performed by: STUDENT IN AN ORGANIZED HEALTH CARE EDUCATION/TRAINING PROGRAM

## 2025-01-14 PROCEDURE — G0378 HOSPITAL OBSERVATION PER HR: HCPCS

## 2025-01-14 PROCEDURE — 2500000003 HC RX 250 WO HCPCS: Performed by: STUDENT IN AN ORGANIZED HEALTH CARE EDUCATION/TRAINING PROGRAM

## 2025-01-14 PROCEDURE — 97535 SELF CARE MNGMENT TRAINING: CPT

## 2025-01-14 PROCEDURE — 36415 COLL VENOUS BLD VENIPUNCTURE: CPT

## 2025-01-14 PROCEDURE — 85025 COMPLETE CBC W/AUTO DIFF WBC: CPT

## 2025-01-14 RX ORDER — SODIUM CHLORIDE, SODIUM LACTATE, POTASSIUM CHLORIDE, AND CALCIUM CHLORIDE .6; .31; .03; .02 G/100ML; G/100ML; G/100ML; G/100ML
1000 INJECTION, SOLUTION INTRAVENOUS ONCE
Status: COMPLETED | OUTPATIENT
Start: 2025-01-14 | End: 2025-01-14

## 2025-01-14 RX ADMIN — LOSARTAN POTASSIUM 25 MG: 50 TABLET, FILM COATED ORAL at 08:38

## 2025-01-14 RX ADMIN — ENOXAPARIN SODIUM 40 MG: 100 INJECTION SUBCUTANEOUS at 08:39

## 2025-01-14 RX ADMIN — LEVETIRACETAM 500 MG: 500 TABLET, FILM COATED ORAL at 08:38

## 2025-01-14 RX ADMIN — SERTRALINE HYDROCHLORIDE 25 MG: 25 TABLET ORAL at 08:38

## 2025-01-14 RX ADMIN — SODIUM CHLORIDE, POTASSIUM CHLORIDE, SODIUM LACTATE AND CALCIUM CHLORIDE 1000 ML: 600; 310; 30; 20 INJECTION, SOLUTION INTRAVENOUS at 07:46

## 2025-01-14 RX ADMIN — SODIUM CHLORIDE, PRESERVATIVE FREE 10 ML: 5 INJECTION INTRAVENOUS at 08:38

## 2025-01-14 RX ADMIN — LEVETIRACETAM 500 MG: 500 TABLET, FILM COATED ORAL at 20:32

## 2025-01-14 RX ADMIN — SODIUM CHLORIDE, PRESERVATIVE FREE 10 ML: 5 INJECTION INTRAVENOUS at 20:32

## 2025-01-14 ASSESSMENT — PAIN SCALES - GENERAL
PAINLEVEL_OUTOF10: 0
PAINLEVEL_OUTOF10: 1

## 2025-01-14 NOTE — PROGRESS NOTES
Physical Therapy Med Surg Daily Treatment Note  Facility[facility] [facility]  Room: [facility] [address]       NAME: Rayna Seals  : 1940 (84 y.o.)  MRN: [id number]  CODE STATUS: Full Code    Date of Service: 2025    Patient Diagnosis(es): Gait disturbance [R26.9]  General weakness [R53.1]  Fall, initial encounter [W19.XXXA]  Fall at home, initial encounter [W19.XXXA, Y92.009]  History of dementia [Z86.59]   Chief Complaint   Patient presents with    Leg Pain     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 Pleasantly confused. Agreeable to tx.  Response To Previous Treatment: Patient with no complaints from previous session.    Pain  Patient denies pain at rest. C/o LLE pain with Wbing. Unable to state pain level.     OBJECTIVE:   Orientation  Overall Orientation Status: Impaired  Orientation Level: Disoriented to place;Disoriented to time;Disoriented to situation;Oriented to person  Cognition  Following Commands: Follows one step commands with repetition  Attention Span: Difficulty attending to directions    Bed mobility  Rolling to Left: Moderate assistance  Rolling to Right: Moderate assistance  Supine to Sit: Moderate assistance;Maximum assistance  Sit to Supine: Moderate assistance;Maximum assistance  Scooting: Moderate assistance  Bed Mobility Comments: HOB flat; patient requires verbal cues for sequencing and hand over hand

## 2025-01-14 NOTE — PLAN OF CARE
Problem: Discharge Planning  Goal: Discharge to home or other facility with appropriate resources  Outcome: Progressing  Flowsheets (Taken 1/13/2025 1630 by Jerald Mcclendon, RN)  Discharge to home or other facility with appropriate resources:   Identify barriers to discharge with patient and caregiver   Arrange for needed discharge resources and transportation as appropriate   Identify discharge learning needs (meds, wound care, etc)     Problem: Pain  Goal: Verbalizes/displays adequate comfort level or baseline comfort level  Outcome: Progressing     Problem: Safety - Adult  Goal: Free from fall injury  Outcome: Progressing     Problem: Skin/Tissue Integrity  Goal: Absence of new skin breakdown  Description: 1.  Monitor for areas of redness and/or skin breakdown  2.  Assess vascular access sites hourly  3.  Every 4-6 hours minimum:  Change oxygen saturation probe site  4.  Every 4-6 hours:  If on nasal continuous positive airway pressure, respiratory therapy assess nares and determine need for appliance change or resting period.  Outcome: Progressing     Problem: ABCDS Injury Assessment  Goal: Absence of physical injury  Outcome: Progressing

## 2025-01-14 NOTE — PROGRESS NOTES
Comprehensive Nutrition Assessment    Type and Reason for Visit:  Initial, Positive nutrition screen    Nutrition Recommendations/Plan:   Diet dysphagia soft and bite sized   Contiue high calorie oral supplement TID      Malnutrition Assessment:  Malnutrition Status:  Moderate malnutrition (01/14/25 5914)    Context:  Social/Environmental Circumstances     Findings of the 6 clinical characteristics of malnutrition:  Energy Intake:  Unable to assess  Weight Loss:  No weight loss     Body Fat Loss:  Mild body fat loss Orbital   Muscle Mass Loss:  Mild muscle mass loss Temples (temporalis)  Fluid Accumulation:  Mild Extremities   Strength:       Nutrition Assessment:    Pt presents with moderate protein calorie malnutrition.  Unable to obtain diet hx pta, pt is oriented to self, no family present at time of visit..  PO intakes are fair to good per documentation in flowsheets, nutrition supplements have been ordeed.  Noted per RN notes, sandwich needed to be cut up and pt needed assistance with feeding, will add soft and bite sized consistency to diet order.  Recommend SLP consult for BSE to determine appropriate diet consisteny    Nutrition Related Findings:    PMH: CVA, dementia. alert and oriented to self. Presents with complaints of right hip pain status post fall.  Lives with family.  Labs/meds reviewed.  trace ble edema noted Wound Type: None       Current Nutrition Intake & Therapies:    Average Meal Intake: %, 26-50%  Average Supplements Intake: 26-50%  ADULT DIET; Regular  ADULT ORAL NUTRITION SUPPLEMENT; Breakfast, Lunch, Dinner; Standard High Calorie/High Protein Oral Supplement    Anthropometric Measures:  Height: 172.7 cm (5' 8\")  Ideal Body Weight (IBW): 140 lbs (64 kg)    Admission Body Weight: 51.7 kg (114 lb)  Current Body Weight: 51.7 kg (114 lb) (adm wt),Weight Source: Bed scale  Current BMI (kg/m2): 17.3  Usual Body Weight: 53.1 kg (117 lb) (5/28/24, 117 lb (2023)     % Weight Change

## 2025-01-14 NOTE — CARE COORDINATION
KAUSHIKW called Suzan Masters this AM. Left VM to Admission Director, Rossy. Waiting for call back at this time.     Electronically signed by ELIZABET Ernandez on 1/14/2025 at 8:46 AM    Referral sent to [facility] Pending acceptance need pre-cert.   Electronically signed by ELIZABET Ernandez on 1/14/2025 at 10:02 AM    Per Uri, able to accept pt. Will need PT/OT Eval to start pre-cert.     Electronically signed by ELIZABET Ernandez on 1/14/2025 at 1:29 PM

## 2025-01-14 NOTE — PLAN OF CARE
Nutrition Problem #1: Moderate malnutrition  Intervention: Food and/or Nutrient Delivery: Modify Current Diet (Diet dysphagia soft and bite sized  Contiue high calorie oral supplement TID)

## 2025-01-14 NOTE — PLAN OF CARE
0830: Patient medicated as ordered and assessment completed. Patient assisted in eating breakfast. Will continue to monitor and encourage fluids.   1000: Patient daughter Sully called and given update.   1015: Patient given full bath. New external cath placed. Tolerated well. Was incont of stool.   1520: Report called to [facility]. Daughter Sully made aware, but no answer. Voicemail left.   Problem: Chronic Conditions and Co-morbidities  Goal: Patient's chronic conditions and co-morbidity symptoms are monitored and maintained or improved  Outcome: Progressing  Flowsheets  Taken 1/14/2025 0748 by Jerald Mcclendon RN  Care Plan - Patient's Chronic Conditions and Co-Morbidity Symptoms are Monitored and Maintained or Improved:   Monitor and assess patient's chronic conditions and comorbid symptoms for stability, deterioration, or improvement   Collaborate with multidisciplinary team to address chronic and comorbid conditions and prevent exacerbation or deterioration   Update acute care plan with appropriate goals if chronic or comorbid symptoms are exacerbated and prevent overall improvement and discharge  Taken 1/13/2025 2130 by Mirna Zapien RN  Care Plan - Patient's Chronic Conditions and Co-Morbidity Symptoms are Monitored and Maintained or Improved: Monitor and assess patient's chronic conditions and comorbid symptoms for stability, deterioration, or improvement     Problem: Discharge Planning  Goal: Discharge to home or other facility with appropriate resources  1/14/2025 1010 by Jerald Mcclendon RN  Outcome: Progressing  Flowsheets (Taken 1/14/2025 0748)  Discharge to home or other facility with appropriate resources:   Identify barriers to discharge with patient and caregiver   Arrange for needed discharge resources and transportation as appropriate   Identify discharge learning needs (meds, wound care, etc)  1/14/2025 0514 by Mirna Zapien, RN  Outcome: Progressing  Flowsheets  Taken 1/13/2025 2130 by Curry

## 2025-01-14 NOTE — FLOWSHEET NOTE
Patient assessment complete. Denies pain at this time. [place] provided for HS snack. Patient required it to be cut up and help feed her. Scheduled HS medications given per MAR. Bed locked and low with alarm engaged. Call light within patient's reach. Electronically signed by Mirna Zapien RN on 1/13/2025 at 9:34 PM    Large stool incontinent episode. Incontinent and anbil care completed along with new linen applied. Electronically signed by Mirna Zapien RN on 1/14/2025 at 6:48 AM      Patient is having episodes of transient neurologic symptoms of numbness, not at this time. Neurologic exam is normal today. Discussed absolute need to be evaluated during one of these episodes, as neuroimaging would likely be indicated. Differential includes complex migraine, although not associated with headaches, TIA, although very young, central nervous process, unclear, will refer to Neurology for further evaluation. Tala Molina MD

## 2025-01-14 NOTE — PROGRESS NOTES
[facility] Hospitalist Progress Note    Admitting Date and Time: 1/13/2025  8:34 AM  Admit Dx: Gait disturbance [R26.9]  General weakness [R53.1]  Fall, initial encounter [W19.XXXA]  Fall at home, initial encounter [W19.XXXA, Y92.009]  History of dementia [Z86.59]    Subjective:      No acute events overnight. Patient this morning AAOX1 with no complaints. No concerns when I talked with RN    ROS: denies fever, chills, cp, sob, n/v, HA unless stated above.       sodium chloride flush  5-40 mL IntraVENous 2 times per day    enoxaparin  40 mg SubCUTAneous Daily    losartan  25 mg Oral Daily    levETIRAcetam  500 mg Oral BID    sertraline  25 mg Oral Daily     sodium chloride flush, 5-40 mL, PRN  sodium chloride, , PRN  potassium chloride, 40 mEq, PRN   Or  potassium alternative oral replacement, 40 mEq, PRN   Or  potassium chloride, 10 mEq, PRN  magnesium sulfate, 2,000 mg, PRN  ondansetron, 4 mg, Q8H PRN   Or  ondansetron, 4 mg, Q6H PRN  polyethylene glycol, 17 g, Daily PRN  acetaminophen, 650 mg, Q6H PRN   Or  acetaminophen, 650 mg, Q6H PRN         Objective:    BP (!) 166/66   Pulse 80   Temp 98.1 °F (36.7 °C) (Oral)   Resp 29   Ht 1.727 m (5' 8\")   Wt 51.7 kg (114 lb)   SpO2 95%   BMI 17.33 kg/m²     General Appearance: alert and oriented to person only   Skin: warm and dry  Head: normocephalic and atraumatic  Eyes: pupils equal, round, and reactive to light, extraocular eye movements intact, conjunctivae normal  Neck: neck supple and non tender without mass   Pulmonary/Chest: clear to auscultation bilaterally- no wheezes, rales or rhonchi, normal air movement, no respiratory distress  Cardiovascular: normal rate, normal S1 and S2 and no carotid bruits  Abdomen: soft, non-tender, non-distended, normal bowel sounds, no masses or organomegaly  Extremities: no cyanosis, no clubbing and no edema  Neurologic: no cranial nerve deficit and speech normal        Recent Labs     01/13/25  1219 01/14/25  0549     138   K 4.6 3.9    100   CO2 26 30   BUN 16 22   CREATININE 0.94* 1.10*   GLUCOSE 101* 105*   CALCIUM 8.8 8.5       Recent Labs     01/13/25  1029 01/14/25  0549   WBC 11.7* 12.8*   RBC 5.37 4.59   HGB 15.0 12.9   HCT 46.8 40.2   MCV 87.2 87.6   MCH 27.9 28.1   MCHC 32.1* 32.1*   RDW 14.0 14.0    298       Radiology:   CT Head W/O Contrast   Final Result   1. No acute intracranial abnormality. Age related cortical atrophy and Theresa   ventricular leukomalacia.   2. If clinical concern warrants, consider MRI to evaluate for acute ischemia.   3. No significant interval change.         XR FEMUR RIGHT (MIN 2 VIEWS)   Final Result   Degenerative changes involving the right femur with no evidence of acute bony   abnormality.         XR HIP RIGHT (2-3 VIEWS)   Final Result   No acute fracture visualized radiographically.             Assessment/Plan:    Mechanical fall  -Trauma scans in ED negative for fracture  -UA negative for infection  -PT/OT  -SW following for likely placement     Seizure disorder  -Continue Keppra    HTN  -not on home meds but hypertensive on admission. Starting losartan 25 mg daily          Electronically signed by Usman Mercedes MD on 1/14/2025 at 11:48 AM

## 2025-01-15 PROBLEM — R53.1 WEAKNESS: Status: ACTIVE | Noted: 2025-01-15

## 2025-01-15 PROCEDURE — 6360000002 HC RX W HCPCS: Performed by: STUDENT IN AN ORGANIZED HEALTH CARE EDUCATION/TRAINING PROGRAM

## 2025-01-15 PROCEDURE — 1210000000 HC MED SURG R&B

## 2025-01-15 PROCEDURE — 2500000003 HC RX 250 WO HCPCS: Performed by: STUDENT IN AN ORGANIZED HEALTH CARE EDUCATION/TRAINING PROGRAM

## 2025-01-15 PROCEDURE — 96372 THER/PROPH/DIAG INJ SC/IM: CPT

## 2025-01-15 PROCEDURE — 97535 SELF CARE MNGMENT TRAINING: CPT

## 2025-01-15 PROCEDURE — 6370000000 HC RX 637 (ALT 250 FOR IP): Performed by: STUDENT IN AN ORGANIZED HEALTH CARE EDUCATION/TRAINING PROGRAM

## 2025-01-15 RX ADMIN — LEVETIRACETAM 500 MG: 500 TABLET, FILM COATED ORAL at 20:43

## 2025-01-15 RX ADMIN — LOSARTAN POTASSIUM 25 MG: 50 TABLET, FILM COATED ORAL at 08:16

## 2025-01-15 RX ADMIN — LEVETIRACETAM 500 MG: 500 TABLET, FILM COATED ORAL at 08:16

## 2025-01-15 RX ADMIN — SODIUM CHLORIDE, PRESERVATIVE FREE 10 ML: 5 INJECTION INTRAVENOUS at 08:16

## 2025-01-15 RX ADMIN — SERTRALINE HYDROCHLORIDE 25 MG: 25 TABLET ORAL at 08:16

## 2025-01-15 RX ADMIN — ENOXAPARIN SODIUM 40 MG: 100 INJECTION SUBCUTANEOUS at 08:16

## 2025-01-15 RX ADMIN — SODIUM CHLORIDE, PRESERVATIVE FREE 10 ML: 5 INJECTION INTRAVENOUS at 20:43

## 2025-01-15 ASSESSMENT — PAIN SCALES - GENERAL
PAINLEVEL_OUTOF10: 0
PAINLEVEL_OUTOF10: 1

## 2025-01-15 NOTE — PROGRESS NOTES
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 Agreeable to tx this date. \" We can try I guess.\"  Response To Previous Treatment: Patient with no complaints from previous session.    Pain   Unable to obtain- no signs of pain via facial features, no visual grimacing    OBJECTIVE:        Bed mobility  Supine to Sit: Maximum assistance  Sit to Supine: Maximum assistance  Bed Mobility Comments: HOB flat; patient requires verbal cues for sequencing and needs increased time to complete.    Transfers  Sit to Stand: Moderate Assistance;2 Person Assistance  Stand to Sit: Moderate Assistance;2 Person Assistance  Comment: Pt attempts  STS x 5 with static standing balance from the EOB, requiring cues for sequencing. Mild postural lean, requiring assistance.    Ambulation  Comments: Declines this date       Activity  Tolerance  Activity Tolerance: Treatment limited secondary to decreased cognition          ASSESSMENT   Assessment: Continues to require increase assistance to perform STS transfers and bed mobility. Poor tolerance in standing, citing LLE pain and weakness in mid thigh. Unable to obtain number d/t patient confusion. Pt declines to attempt ambulation d/t LLE pain in standing.     Discharge Recommendations:  Continue to assess pending progress, Therapy recommended at discharge         Goals  Long Term Goals  Long Term Goal 1: Pt to complete bed mobility with SBA  Long Term Goal 2: Pt to complete transfers with CGA  Long Term Goal 3: Pt to ambulate 25-50ft with LRD and CGA    PLAN    General Plan: 1 time a day 3-6 times a week  Safety Devices  Type of Devices: All fall risk precautions in place, Bed alarm in place, Call light within reach, Left in bed     AMPAC (6 CLICK) BASIC MOBILITY  AM-PAC Inpatient Mobility Raw Score : 10     Therapy Time   Individual   Time In 0940   Time Out 0958   Minutes 18     Timed Code Treatment Minutes: 18 Minutes   Tr/BM: 18    Armin Adams PTA, 01/15/25 at 10:48 AM         Definitions for assistance levels  Independent = pt does not require any physical supervision or assistance from another person for activity completion. Device may be needed.  Stand by assistance = pt requires verbal cues or instructions from another person, close to but not touching, to perform the activity  Minimal assistance= pt performs 75% or more of the activity; assistance is required to complete the activity  Moderate assistance= pt performs 50% of the activity; assistance is required to complete the activity  Maximal assistance = pt performs 25% of the activity; assistance is required to complete the activity  Dependent = pt requires total physical assistance to accomplish the task

## 2025-01-15 NOTE — PROGRESS NOTES
[facility] MED SURG TREATMENT NOTE     Date: 1/15/2025  Patient Name: Rayna Seals        MRN: [id number]  Account: [id number]   : 1940  (84 y.o.)  Room: [place] [address]35-01    Chart Review:    Restrictions  Restrictions/Precautions  Restrictions/Precautions: Fall Risk  Activity Level: Up with Assist     Safety:  Safety Devices  Type of Devices: All fall risk precautions in place;Bed alarm in place;Call light within reach;Left in bed    Patient's birthday verified: Pt verbally unable, verified via wrist band    Subjective: \"You're as pretty as you've ever been!\"     Pain   Pain at start of treatment: No    Pain at end of treatment: No        Objective: Pt in bed resting upon arrival. Introduced self, explained role of OT, and offered to assist pt with ADL completion. Pt unable to verbalize if she washed up this morning. Pt agreeable to completing grooming and changing hospital gown following Min encouragement. Pt completed as follows.     ADL Status:  Grooming: Minimal assistance;Increased time to complete  Grooming Skilled Clinical Factors: Pt completed oral care, hair care, and washed face. Pt requried set up with VCs to initiate hair care. Pt required set up with VCs to wash face. Min A for thoroughness. Pt rinsed mouth with wash mouth with SBA and Min cues for sequencing  UE Dressing: Unable to assess   UE Dressing Skilled Clinical Factors: Hospital Gown Only    Cognition Status:  Overall Cognitive Status: Exceptions  Arousal/Alertness: Appears intact  Following Commands: Follows one step commands with repetition;Follows one step commands with increased time  Insights: Not aware of deficits  Initiation: Requires cues for all  Sequencing: Requires cues for all    Orientation Status:  Overall Orientation Status: Impaired  Orientation Level: Disoriented to place;Disoriented to time;Disoriented to situation;Oriented to person      Therapy key for assistance levels -   Independent/Mod I = Pt.  Learning: Cognition  Education Outcome: Continued education needed    Equipment recommendations:  Continue to assess pending progress.     Goals/Plan of care addressed during this session:  Patient Goal: Patient goals : \"I want to get out of here\"  Improve [place] with ADLs    Therapy Time:   Individual Group Co-Treat   Time In 1008       Time Out 1018         Minutes 10           ADL/IADL training: 10 minutes    Electronically signed by:    JADON Mistry    1/15/2025, 10:34 AM

## 2025-01-15 NOTE — PROGRESS NOTES
[facility] Hospitalist Progress Note    Admitting Date and Time: 1/13/2025  8:34 AM  Admit Dx: Gait disturbance [R26.9]  General weakness [R53.1]  Fall, initial encounter [W19.XXXA]  Fall at home, initial encounter [W19.XXXA, Y92.009]  History of dementia [Z86.59]    Subjective:      No acute events overnight. Patient this morning AAOX1 with no complaints.     ROS: denies fever, chills, cp, sob, n/v, HA unless stated above.       sodium chloride flush  5-40 mL IntraVENous 2 times per day    enoxaparin  40 mg SubCUTAneous Daily    losartan  25 mg Oral Daily    levETIRAcetam  500 mg Oral BID    sertraline  25 mg Oral Daily     sodium chloride flush, 5-40 mL, PRN  sodium chloride, , PRN  potassium chloride, 40 mEq, PRN   Or  potassium alternative oral replacement, 40 mEq, PRN   Or  potassium chloride, 10 mEq, PRN  magnesium sulfate, 2,000 mg, PRN  ondansetron, 4 mg, Q8H PRN   Or  ondansetron, 4 mg, Q6H PRN  polyethylene glycol, 17 g, Daily PRN  acetaminophen, 650 mg, Q6H PRN   Or  acetaminophen, 650 mg, Q6H PRN         Objective:    BP (!) 158/78   Pulse 80   Temp 97.9 °F (36.6 °C) (Oral)   Resp 18   Ht 1.727 m (5' 8\")   Wt 51.7 kg (114 lb)   SpO2 99%   BMI 17.33 kg/m²     General Appearance: alert and oriented to person only   Skin: warm and dry  Head: normocephalic and atraumatic  Eyes: pupils equal, round, and reactive to light, extraocular eye movements intact, conjunctivae normal  Neck: neck supple and non tender without mass   Pulmonary/Chest: clear to auscultation bilaterally- no wheezes, rales or rhonchi, normal air movement, no respiratory distress  Cardiovascular: normal rate, normal S1 and S2 and no carotid bruits  Abdomen: soft, non-tender, non-distended, normal bowel sounds, no masses or organomegaly  Extremities: no cyanosis, no clubbing and no edema  Neurologic: no cranial nerve deficit and speech normal        Recent Labs     01/13/25  1219 01/14/25  0549    138   K 4.6 3.9   CL

## 2025-01-15 NOTE — CARE COORDINATION
This LSW visited patient at bedside this afternoon.  Patient completed both PT and OT evaluations today.   I spoke with Uri, admissions liaison for [facility]. Per Uri patients referral has been accepted and insurance authorization was initiated today, 1/15/2025.  Electronically signed by ELIZABET Dinh on 1/15/25 at 12:17 PM EST

## 2025-01-15 NOTE — PROGRESS NOTES
Shift assessments complete, see flowsheets. Pt alert and oriented to self. Medication administered per MAR, VSS. Pt able to make needs and wants known. No further needs verbalized at this time call light left within reach.                Abdomen , soft, nontender, nondistended , no guarding or rigidity , no masses palpable , normal bowel sounds , Liver and Spleen,  no hepatosplenomegaly , liver nontender

## 2025-01-15 NOTE — PLAN OF CARE
Problem: Chronic Conditions and Co-morbidities  Goal: Patient's chronic conditions and co-morbidity symptoms are monitored and maintained or improved  1/15/2025 0951 by Smiley Worthy RN  Outcome: Progressing  1/14/2025 2119 by Imani Palacios RN  Outcome: Progressing     Problem: Discharge Planning  Goal: Discharge to home or other facility with appropriate resources  1/15/2025 0951 by Smiley Worthy RN  Outcome: Progressing  1/14/2025 2119 by Imani Palacios RN  Outcome: Progressing     Problem: Pain  Goal: Verbalizes/displays adequate comfort level or baseline comfort level  1/15/2025 0951 by Smiley Worthy RN  Outcome: Progressing  1/14/2025 2119 by Imani Palacios RN  Outcome: Progressing     Problem: Safety - Adult  Goal: Free from fall injury  1/15/2025 0951 by Smiley Worthy RN  Outcome: Progressing  1/14/2025 2119 by Imani Palacios RN  Outcome: Progressing     Problem: Skin/Tissue Integrity  Goal: Absence of new skin breakdown  Description: 1.  Monitor for areas of redness and/or skin breakdown  2.  Assess vascular access sites hourly  3.  Every 4-6 hours minimum:  Change oxygen saturation probe site  4.  Every 4-6 hours:  If on nasal continuous positive airway pressure, respiratory therapy assess nares and determine need for appliance change or resting period.  1/15/2025 0951 by Smiley Worthy RN  Outcome: Progressing  1/14/2025 2119 by Imani Palacios RN  Outcome: Progressing     Problem: ABCDS Injury Assessment  Goal: Absence of physical injury  1/15/2025 0951 by Smiley Worthy RN  Outcome: Progressing  1/14/2025 2119 by Imani Palacios RN  Outcome: Progressing     Problem: Nutrition Deficit:  Goal: Optimize nutritional status  1/15/2025 0951 by Smiley Worthy RN  Outcome: Progressing  1/14/2025 2119 by Imani Palacios RN  Outcome: Progressing

## 2025-01-16 VITALS
SYSTOLIC BLOOD PRESSURE: 138 MMHG | HEIGHT: 68 IN | WEIGHT: 114 LBS | HEART RATE: 86 BPM | OXYGEN SATURATION: 99 % | RESPIRATION RATE: 18 BRPM | TEMPERATURE: 99 F | DIASTOLIC BLOOD PRESSURE: 58 MMHG | BODY MASS INDEX: 17.28 KG/M2

## 2025-01-16 LAB
ANION GAP SERPL CALCULATED.3IONS-SCNC: 9 MEQ/L (ref 9–15)
BUN SERPL-MCNC: 16 MG/DL (ref 8–23)
CALCIUM SERPL-MCNC: 8.6 MG/DL (ref 8.5–9.9)
CHLORIDE SERPL-SCNC: 101 MEQ/L (ref 95–107)
CO2 SERPL-SCNC: 29 MEQ/L (ref 20–31)
CREAT SERPL-MCNC: 0.81 MG/DL (ref 0.5–0.9)
GLUCOSE SERPL-MCNC: 98 MG/DL (ref 70–99)
POTASSIUM SERPL-SCNC: 3.7 MEQ/L (ref 3.4–4.9)
SODIUM SERPL-SCNC: 139 MEQ/L (ref 135–144)

## 2025-01-16 PROCEDURE — 97535 SELF CARE MNGMENT TRAINING: CPT

## 2025-01-16 PROCEDURE — 36415 COLL VENOUS BLD VENIPUNCTURE: CPT

## 2025-01-16 PROCEDURE — 80048 BASIC METABOLIC PNL TOTAL CA: CPT

## 2025-01-16 PROCEDURE — 6360000002 HC RX W HCPCS: Performed by: STUDENT IN AN ORGANIZED HEALTH CARE EDUCATION/TRAINING PROGRAM

## 2025-01-16 PROCEDURE — 6370000000 HC RX 637 (ALT 250 FOR IP): Performed by: STUDENT IN AN ORGANIZED HEALTH CARE EDUCATION/TRAINING PROGRAM

## 2025-01-16 RX ORDER — CIPROFLOXACIN HYDROCHLORIDE 3.5 MG/ML
2 SOLUTION/ DROPS TOPICAL EVERY 6 HOURS
DISCHARGE
Start: 2025-01-16 | End: 2025-01-23

## 2025-01-16 RX ORDER — CIPROFLOXACIN HYDROCHLORIDE 3.5 MG/ML
2 SOLUTION/ DROPS TOPICAL EVERY 6 HOURS SCHEDULED
Status: DISCONTINUED | OUTPATIENT
Start: 2025-01-16 | End: 2025-01-16 | Stop reason: HOSPADM

## 2025-01-16 RX ORDER — LOSARTAN POTASSIUM 25 MG/1
25 TABLET ORAL DAILY
DISCHARGE
Start: 2025-01-17

## 2025-01-16 RX ADMIN — LEVETIRACETAM 500 MG: 500 TABLET, FILM COATED ORAL at 08:05

## 2025-01-16 RX ADMIN — SERTRALINE HYDROCHLORIDE 25 MG: 25 TABLET ORAL at 08:05

## 2025-01-16 RX ADMIN — LOSARTAN POTASSIUM 25 MG: 50 TABLET, FILM COATED ORAL at 08:05

## 2025-01-16 RX ADMIN — ENOXAPARIN SODIUM 40 MG: 100 INJECTION SUBCUTANEOUS at 08:05

## 2025-01-16 ASSESSMENT — PAIN SCALES - GENERAL: PAINLEVEL_OUTOF10: 1

## 2025-01-16 NOTE — PROGRESS NOTES
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 Agreeable to tx this date.    Pain  Pain  Pre-Pain: 0  Post-Pain: 0    OBJECTIVE:        Bed mobility  Supine to Sit: Maximum assistance  Sit to Supine: Maximum assistance  Bed Mobility Comments: HOB flat; patient requires verbal cues for sequencing and needs increased time to complete.    Transfers  Sit to Stand: Moderate Assistance;2 Person Assistance  Stand to Sit: Moderate Assistance;2 Person Assistance  Comment: Pt attempts  STS x 5 with static standing balance from the EOB, requiring cues for sequencing. Mild postural lean, requiring assistance.              Activity Tolerance  Activity Tolerance: Treatment limited secondary to decreased cognition          ASSESSMENT   Assessment: pt limited by cognition, moderate posterior lean noted in standing, unsafe to attempt gait training

## 2025-01-16 NOTE — PLAN OF CARE
Problem: Chronic Conditions and Co-morbidities  Goal: Patient's chronic conditions and co-morbidity symptoms are monitored and maintained or improved  1/16/2025 0916 by Charlene May RN  Outcome: Progressing  1/15/2025 2303 by Imani Palacios RN  Outcome: Progressing     Problem: Discharge Planning  Goal: Discharge to home or other facility with appropriate resources  1/16/2025 0916 by Charlene May RN  Outcome: Progressing  1/15/2025 2303 by Imani Palacios RN  Outcome: Progressing     Problem: Pain  Goal: Verbalizes/displays adequate comfort level or baseline comfort level  1/16/2025 0916 by Charlene May RN  Outcome: Progressing  1/15/2025 2303 by Imani Palacios RN  Outcome: Progressing     Problem: Safety - Adult  Goal: Free from fall injury  1/16/2025 0916 by Charlene May RN  Outcome: Progressing  1/15/2025 2303 by Imani Palacios RN  Outcome: Progressing     Problem: Skin/Tissue Integrity  Goal: Absence of new skin breakdown  Description: 1.  Monitor for areas of redness and/or skin breakdown  2.  Assess vascular access sites hourly  3.  Every 4-6 hours minimum:  Change oxygen saturation probe site  4.  Every 4-6 hours:  If on nasal continuous positive airway pressure, respiratory therapy assess nares and determine need for appliance change or resting period.  1/16/2025 0916 by Chalrene May RN  Outcome: Progressing  1/15/2025 2303 by Imani Palacios RN  Outcome: Progressing

## 2025-01-16 NOTE — DISCHARGE INSTR - COC
Continuity of Care Form    Patient Name: Rayna Seals   :  1940  MRN:  [id number]    Admit date:  2025  Discharge date:  2025    Code Status Order: Full Code   Advance Directives:   Advance Care Flowsheet Documentation             Admitting Physician:  Usman Mercedes MD  PCP: Britni Davenport MD    Discharging Nurse: 4W  Discharging Hospital Unit/Room#: W485/W485-01  Discharging Unit Phone Number: [phone]    Emergency Contact:   Extended Emergency Contact Information  Primary Emergency Contact: COCO SEALS  Address: [address] [place] [address]  Home Phone: [phone]  Mobile Phone: [phone]  Relation: Child   needed? No  Secondary Emergency Contact: Tanesha Mann  Mobile Phone: [phone]  Relation: Child    Past Surgical History:  Past Surgical History:   Procedure Laterality Date    ANKLE FRACTURE SURGERY Left     CHOLECYSTECTOMY      TUBAL LIGATION         Immunization History:   Immunization History   Administered Date(s) Administered    COVID-19, PFIZER Bivalent, DO NOT Dilute, (age 12y+), IM, 30 mcg/0.3 mL 2022    COVID-19, PFIZER GRAY top, DO NOT Dilute, (age 12 y+), IM, 30 mcg/0.3 mL 2022    COVID-19, PFIZER PURPLE top, DILUTE for use, (age 12 y+), 30mcg/0.3mL 2021, 2021, 2021    COVID-19, PFIZER, (age 12y+), IM, 30mcg/0.3mL 2024, 2024       Active Problems:  Patient Active Problem List   Diagnosis Code    Dementia due to Alzheimer's disease (HCC) G30.9, F02.80    Fall at home, initial encounter W19.XXXA, Y92.009    Moderate protein-calorie malnutrition (HCC) E44.0    Weakness R53.1       Isolation/Infection:   Isolation            No Isolation          Patient Infection Status       None to display            Nurse Assessment:  Last Vital Signs: /65   Pulse 87   Temp 98.4 °F (36.9 °C) (Oral)   Resp 18   Ht 1.727 m (5' 8\")   Wt 51.7 kg (114 lb)   SpO2 100%   BMI 17.33 kg/m²     Last  documented pain score (0-10 scale): Pain Level: 1  Last Weight:   Wt Readings from Last 1 Encounters:   01/13/25 51.7 kg (114 lb)     Mental Status:  disoriented and alert    IV Access:  - None    Nursing Mobility/ADLs:  Walking   Assisted  Transfer  Assisted  Bathing  Assisted  Dressing  Assisted  Toileting  Assisted  Feeding  Independent  Med Admin  Assisted  Med Delivery   whole    Wound Care Documentation and Therapy:        Elimination:  Continence:   Bowel: No  Bladder: No  Urinary Catheter: None   Colostomy/Ileostomy/Ileal Conduit: No       Date of Last BM: 01/15/2025    Intake/Output Summary (Last 24 hours) at 1/16/2025 0941  Last data filed at 1/16/2025 0731  Gross per 24 hour   Intake 620 ml   Output 630 ml   Net -10 ml     I/O last 3 completed shifts:  In: 560 [P.O.:560]  Out: 630 [Urine:630]    Safety Concerns:     History of Falls (last 30 days)    Impairments/Disabilities:      None    Nutrition Therapy:  Current Nutrition Therapy:   - Oral Diet:  General    Routes of Feeding: None  Liquids: Thin Liquids  Daily Fluid Restriction: no  Last Modified Barium Swallow with Video (Video Swallowing Test): not done    Treatments at the Time of Hospital Discharge:   Respiratory Treatments: NA  Oxygen Therapy:  is not on home oxygen therapy.  Ventilator:    - No ventilator support    Rehab Therapies: Physical Therapy and Occupational Therapy  Weight Bearing Status/Restrictions: No weight bearing restrictions  Other Medical Equipment (for information only, NOT a DME order):  NA  Other Treatments: NA    Patient's personal belongings (please select all that are sent with patient):  None    RN SIGNATURE:  Electronically signed by Charlene May RN on 1/16/25 at 9:58 AM EST    CASE MANAGEMENT/SOCIAL WORK SECTION    Inpatient Status Date: ***    Readmission Risk Assessment Score:  [facility] RISK OF UNPLANNED READMISSION 2.0             13.1 Total Score        Discharging to Facility/ Agency   Name: [facility]

## 2025-01-16 NOTE — CARE COORDINATION
This LSW received notification that patients insurance has given authorization for SNF transfer.  Discharge orders given.  I arranged transportation via [facility]. Transport is schueled for 12:30pm.  Patient, patients daughter Johanna notified via voicemail message at 11:00am, SCOTTY Chang and Uri admissions liaison are all aware.  I completed [id number] for SNF transfer.  Electronically signed by ELIZABET Dinh on 1/16/25 at 11:03 AM EST

## 2025-01-16 NOTE — DISCHARGE SUMMARY
[facility] Medicine Discharge Summary    Rayna Seals  :  1940  MRN:  [id number]    Admit date:  2025  Discharge date:  2025    Admitting Physician:  Usman Mercedes MD  Primary Care Physician:  Britni Davenport MD    Discharge Diagnoses:    Principal Problem:    Fall at home, initial encounter  Active Problems:    Moderate protein-calorie malnutrition (HCC)    Weakness  Resolved Problems:    * No resolved hospital problems. *    Chief Complaint   Patient presents with    Leg Pain     Condition: improved   Activity: no restrictions   Diet: regular  Disposition: [facility]  Functional Status: ambulatory with assistance    Significant Findings:         Latest Ref Rng & Units 2025     5:01 AM 2025     5:49 AM 2025    12:19 PM 2024    12:15 PM 2023     5:44 AM   Labs Renal   BUN 8 - 23 mg/dL 16  22  16  11  13    Cr 0.50 - 0.90 mg/dL 0.81  1.10  0.94  0.56  0.59    K 3.4 - 4.9 mEq/L 3.7  3.9  4.6  3.8  3.9    Na 135 - 144 mEq/L 139  138  138  140  136          Hospital Course:   84-year-old female history of dementia presented with mechanical fall requiring placement at skilled nursing facility.  She remained stable throughout the hospitalization but was noted to be hypertensive-she was started on losartan 25 mg daily.    My only day taking care of this patient she was noted to have left eye mucopurulent discharge consistent with bacterial conjunctivitis.  She was started on ciprofloxacin eyedrops every 6 hours for total 7-day course.  She should be referred to ophthalmologist if there is no improvement after 2 days.      Exam on Discharge:   /65   Pulse 87   Temp 98.4 °F (36.9 °C) (Oral)   Resp 18   Ht 1.727 m (5' 8\")   Wt 51.7 kg (114 lb)   SpO2 100%   BMI 17.33 kg/m²   General appearance: alert, cooperative and no distress.  Left eye with erythema and mucopurulent discharge in inferior eyelid  Mental Status: oriented to person,  place and time and normal affect  Lungs: clear to auscultation bilaterally, normal effort  Heart: regular rate and rhythm, no murmur  Abdomen: soft, nontender, nondistended, bowel sounds present, no masses  Extremities: no edema, redness, tenderness in the calves  Skin: no gross lesions, rashes    Discharge Medication List:     Medication List        START taking these medications      ciprofloxacin 0.3 % ophthalmic solution  Commonly known as: CILOXAN  Place 2 drops into the left eye every 6 hours for 28 doses     losartan 25 MG tablet  Commonly known as: COZAAR  Take 1 tablet by mouth daily  Start taking on: January 17, 2025            CONTINUE taking these medications      levETIRAcetam 500 MG tablet  Commonly known as: KEPPRA     sertraline 50 MG tablet  Commonly known as: ZOLOFT            STOP taking these medications      betamethasone dipropionate 0.05 % ointment     lidocaine 5 % ointment  Commonly known as: XYLOCAINE     lisinopril 5 MG tablet  Commonly known as: PRINIVIL;ZESTRIL     melatonin 3 MG Tabs tablet     Multivitamin Adult Chew               Where to Get Your Medications        Information about where to get these medications is not yet available    Ask your nurse or doctor about these medications  ciprofloxacin 0.3 % ophthalmic solution  losartan 25 MG tablet         DC time 43 minutes    Signed:  Chase Harris DO  1/16/2025, 1:47 PM

## 2025-01-17 NOTE — PROGRESS NOTES
Physical Therapy  Facility/Department: [facility] MED SURG W485/W485-01  Physical Therapy Discharge      NAME: Rayna Seals    : 1940 (84 y.o.)  MRN: [id number]    Account: [id number]  Gender: female      Patient has been discharged from acute care hospital. DC patient from current PT program.      Electronically signed by Robyn Barrios PT on 25 at 3:43 PM EST

## 2025-01-17 NOTE — PROGRESS NOTES
Physician Progress Note      PATIENT:               PIPE RUIZ  [facility] #:                  [id number]  :                       1940  ADMIT DATE:       2025 8:34 AM  DISCH DATE:  RESPONDING  PROVIDER #:        Chase Harris DO          QUERY TEXT:    Patient admitted from home after fall. Noted to have Dementia, dysphagia, BMI   17.4 and Nutritional Assessment documenting Moderate malnutrition. If   possible, please document in progress notes and discharge summary if you are   evaluating and /or treating any of the following:    The medical record reflects the following:  Risk Factors: Dementia, dysphagia, BMI 17.4  Clinical Indicators: ASPEN criteria met: Body Fat Loss:  Mild body fat loss   Orbital  Muscle Mass Loss:  Mild muscle mass loss Temples (temporalis)  Fluid Accumulation:  Mild Extremities  Treatment: Dietician eval & treat, dysphagia soft bite-sized diet, high   calorie ONS 3x daily, I&O, weights monitoring    ASPEN Criteria:    https://aspenjournals.onlinelibrary.jarrett.com/doi/full/10.1177/[email or web address]  5    Thank you,  Andie Yancey   Clinical Documentation Improvement Specialist  W: [phone]  Options provided:  -- Protein calorie malnutrition moderate  -- Other - I will add my own diagnosis  -- Disagree - Not applicable / Not valid  -- Disagree - Clinically unable to determine / Unknown  -- Refer to Clinical Documentation Reviewer    PROVIDER RESPONSE TEXT:    This patient has moderate protein calorie malnutrition.    Query created by: Andie Yancey on 2025 8:24 AM      QUERY TEXT:    Pt admitted with from home after fall. Pt noted in the PT/OT evaluation to   have Decreased functional mobility ;Decreased ADL status; Decreased tolerance   to work activity; Decreased strength; Decreased endurance; Decreased balance;   Decreased coordination; Increased pain; Decreased safe awareness.    If possible, please document in the progress notes and discharge summary if   you are

## 2025-02-12 ENCOUNTER — HOSPITAL ENCOUNTER (INPATIENT)
Age: 85
LOS: 8 days | Discharge: NURSING, REHAB OR LONG-TERM CARE | DRG: 193 | End: 2025-02-20
Attending: INTERNAL MEDICINE | Admitting: INTERNAL MEDICINE
Payer: MEDICARE

## 2025-02-12 ENCOUNTER — APPOINTMENT (OUTPATIENT)
Dept: GENERAL RADIOLOGY | Age: 85
DRG: 193 | End: 2025-02-12
Payer: MEDICARE

## 2025-02-12 ENCOUNTER — APPOINTMENT (OUTPATIENT)
Dept: CT IMAGING | Age: 85
DRG: 193 | End: 2025-02-12
Payer: MEDICARE

## 2025-02-12 DIAGNOSIS — I26.99 PULMONARY EMBOLISM WITHOUT ACUTE COR PULMONALE, UNSPECIFIED CHRONICITY, UNSPECIFIED PULMONARY EMBOLISM TYPE (HCC): ICD-10-CM

## 2025-02-12 DIAGNOSIS — R06.02 SHORTNESS OF BREATH: ICD-10-CM

## 2025-02-12 DIAGNOSIS — I26.90 ACUTE SEPTIC PULMONARY EMBOLISM WITHOUT ACUTE COR PULMONALE (HCC): ICD-10-CM

## 2025-02-12 DIAGNOSIS — R09.02 HYPOXEMIA: ICD-10-CM

## 2025-02-12 DIAGNOSIS — I48.0 PAROXYSMAL ATRIAL FIBRILLATION (HCC): ICD-10-CM

## 2025-02-12 DIAGNOSIS — J10.1 INFLUENZA A: Primary | ICD-10-CM

## 2025-02-12 LAB
ALBUMIN SERPL-MCNC: 2.9 G/DL (ref 3.5–4.6)
ALP SERPL-CCNC: 85 U/L (ref 40–130)
ALT SERPL-CCNC: 17 U/L (ref 0–33)
ANION GAP SERPL CALCULATED.3IONS-SCNC: 13 MEQ/L (ref 9–15)
AST SERPL-CCNC: 36 U/L (ref 0–35)
B PARAP IS1001 DNA NPH QL NAA+NON-PROBE: NOT DETECTED
B PERT.PT PRMT NPH QL NAA+NON-PROBE: NOT DETECTED
BACTERIA URNS QL MICRO: ABNORMAL /HPF
BASE EXCESS ARTERIAL: 4 (ref -3–3)
BASOPHILS # BLD: 0 K/UL (ref 0–0.2)
BASOPHILS NFR BLD: 0.1 %
BILIRUB SERPL-MCNC: 0.3 MG/DL (ref 0.2–0.7)
BILIRUB UR QL STRIP: NEGATIVE
BNP BLD-MCNC: 3450 PG/ML
BUN SERPL-MCNC: 26 MG/DL (ref 8–23)
C PNEUM DNA NPH QL NAA+NON-PROBE: NOT DETECTED
CALCIUM IONIZED: 1.23 MMOL/L (ref 1.12–1.32)
CALCIUM SERPL-MCNC: 8.6 MG/DL (ref 8.5–9.9)
CHLORIDE SERPL-SCNC: 109 MEQ/L (ref 95–107)
CLARITY UR: ABNORMAL
CO2 SERPL-SCNC: 23 MEQ/L (ref 20–31)
COLOR UR: ABNORMAL
CREAT SERPL-MCNC: 1.29 MG/DL (ref 0.5–0.9)
EOSINOPHIL # BLD: 0 K/UL (ref 0–0.7)
EOSINOPHIL NFR BLD: 0.1 %
EPI CELLS #/AREA URNS AUTO: ABNORMAL /HPF (ref 0–5)
ERYTHROCYTE [DISTWIDTH] IN BLOOD BY AUTOMATED COUNT: 14.5 % (ref 11.5–14.5)
FLUAV H1 2009 PAN RNA NPH NAA+NON-PROBE: DETECTED
FLUBV RNA NPH QL NAA+NON-PROBE: NOT DETECTED
GLOBULIN SER CALC-MCNC: 3.5 G/DL (ref 2.3–3.5)
GLUCOSE BLD-MCNC: 150 MG/DL (ref 70–99)
GLUCOSE SERPL-MCNC: 100 MG/DL (ref 70–99)
GLUCOSE UR STRIP-MCNC: NEGATIVE MG/DL
HADV DNA NPH QL NAA+NON-PROBE: NOT DETECTED
HCO3 ARTERIAL: 29.2 MMOL/L (ref 21–29)
HCOV 229E RNA NPH QL NAA+NON-PROBE: NOT DETECTED
HCOV HKU1 RNA NPH QL NAA+NON-PROBE: NOT DETECTED
HCOV NL63 RNA NPH QL NAA+NON-PROBE: NOT DETECTED
HCOV OC43 RNA NPH QL NAA+NON-PROBE: NOT DETECTED
HCT VFR BLD AUTO: 35 % (ref 36–48)
HCT VFR BLD AUTO: 40.5 % (ref 37–47)
HGB BLD CALC-MCNC: 11.9 GM/DL (ref 12–16)
HGB BLD-MCNC: 12.8 G/DL (ref 12–16)
HGB UR QL STRIP: NEGATIVE
HMPV RNA NPH QL NAA+NON-PROBE: NOT DETECTED
HPIV1 RNA NPH QL NAA+NON-PROBE: NOT DETECTED
HPIV2 RNA NPH QL NAA+NON-PROBE: NOT DETECTED
HPIV3 RNA NPH QL NAA+NON-PROBE: NOT DETECTED
HPIV4 RNA NPH QL NAA+NON-PROBE: NOT DETECTED
HYALINE CASTS #/AREA URNS LPF: ABNORMAL /LPF (ref 0–5)
KETONES UR STRIP-MCNC: ABNORMAL MG/DL
LACTATE: 0.71 MMOL/L (ref 0.4–2)
LEUKOCYTE ESTERASE UR QL STRIP: NEGATIVE
LYMPHOCYTES # BLD: 1.2 K/UL (ref 1–4.8)
LYMPHOCYTES NFR BLD: 14.8 %
M PNEUMO DNA NPH QL NAA+NON-PROBE: NOT DETECTED
MCH RBC QN AUTO: 28.4 PG (ref 27–31.3)
MCHC RBC AUTO-ENTMCNC: 31.6 % (ref 33–37)
MCV RBC AUTO: 89.8 FL (ref 79.4–94.8)
MONOCYTES # BLD: 0.6 K/UL (ref 0.2–0.8)
MONOCYTES NFR BLD: 7.7 %
NEUTROPHILS # BLD: 6.2 K/UL (ref 1.4–6.5)
NEUTS SEG NFR BLD: 76.9 %
NITRITE UR QL STRIP: NEGATIVE
O2 SAT, ARTERIAL: 92 % (ref 93–100)
PCO2 ARTERIAL: 49 MM HG (ref 35–45)
PERFORMED ON: ABNORMAL
PH ARTERIAL: 7.38 (ref 7.35–7.45)
PH UR STRIP: 5.5 [PH] (ref 5–9)
PLATELET # BLD AUTO: 228 K/UL (ref 130–400)
PO2 ARTERIAL: 65 MM HG (ref 75–108)
POC CHLORIDE: 110 MEQ/L (ref 99–110)
POC CREATININE: 1.1 MG/DL (ref 0.6–1.2)
POC FIO2: 2
POC SAMPLE TYPE: ABNORMAL
POTASSIUM SERPL-SCNC: 3.8 MEQ/L (ref 3.5–5.1)
POTASSIUM SERPL-SCNC: 4.4 MEQ/L (ref 3.4–4.9)
PROCALCITONIN SERPL IA-MCNC: 0.1 NG/ML (ref 0–0.15)
PROT SERPL-MCNC: 6.4 G/DL (ref 6.3–8)
PROT UR STRIP-MCNC: 30 MG/DL
RBC # BLD AUTO: 4.51 M/UL (ref 4.2–5.4)
RBC #/AREA URNS HPF: ABNORMAL /HPF (ref 0–2)
RSV RNA NPH QL NAA+NON-PROBE: NOT DETECTED
RV+EV RNA NPH QL NAA+NON-PROBE: NOT DETECTED
SARS-COV-2 RNA NPH QL NAA+NON-PROBE: NOT DETECTED
SODIUM BLD-SCNC: 149 MEQ/L (ref 136–145)
SODIUM SERPL-SCNC: 145 MEQ/L (ref 135–144)
SP GR UR STRIP: 1.02 (ref 1–1.03)
TCO2 ARTERIAL: 31 MMOL/L (ref 21–32)
TROPONIN, HIGH SENSITIVITY: 30 NG/L (ref 0–19)
TROPONIN, HIGH SENSITIVITY: 31 NG/L (ref 0–19)
URINE REFLEX TO CULTURE: YES
UROBILINOGEN UR STRIP-ACNC: 1 E.U./DL
WBC # BLD AUTO: 8 K/UL (ref 4.8–10.8)
WBC #/AREA URNS AUTO: ABNORMAL /HPF (ref 0–5)

## 2025-02-12 PROCEDURE — 71045 X-RAY EXAM CHEST 1 VIEW: CPT

## 2025-02-12 PROCEDURE — 80053 COMPREHEN METABOLIC PANEL: CPT

## 2025-02-12 PROCEDURE — 6370000000 HC RX 637 (ALT 250 FOR IP): Performed by: PERSONAL EMERGENCY RESPONSE ATTENDANT

## 2025-02-12 PROCEDURE — 82565 ASSAY OF CREATININE: CPT

## 2025-02-12 PROCEDURE — 82435 ASSAY OF BLOOD CHLORIDE: CPT

## 2025-02-12 PROCEDURE — 0202U NFCT DS 22 TRGT SARS-COV-2: CPT

## 2025-02-12 PROCEDURE — 84132 ASSAY OF SERUM POTASSIUM: CPT

## 2025-02-12 PROCEDURE — 85014 HEMATOCRIT: CPT

## 2025-02-12 PROCEDURE — 94761 N-INVAS EAR/PLS OXIMETRY MLT: CPT

## 2025-02-12 PROCEDURE — 84295 ASSAY OF SERUM SODIUM: CPT

## 2025-02-12 PROCEDURE — 70450 CT HEAD/BRAIN W/O DYE: CPT

## 2025-02-12 PROCEDURE — 83880 ASSAY OF NATRIURETIC PEPTIDE: CPT

## 2025-02-12 PROCEDURE — 36415 COLL VENOUS BLD VENIPUNCTURE: CPT

## 2025-02-12 PROCEDURE — 93005 ELECTROCARDIOGRAM TRACING: CPT | Performed by: PERSONAL EMERGENCY RESPONSE ATTENDANT

## 2025-02-12 PROCEDURE — 2060000000 HC ICU INTERMEDIATE R&B

## 2025-02-12 PROCEDURE — 2500000003 HC RX 250 WO HCPCS

## 2025-02-12 PROCEDURE — 87088 URINE BACTERIA CULTURE: CPT

## 2025-02-12 PROCEDURE — 96365 THER/PROPH/DIAG IV INF INIT: CPT

## 2025-02-12 PROCEDURE — 2500000003 HC RX 250 WO HCPCS: Performed by: PERSONAL EMERGENCY RESPONSE ATTENDANT

## 2025-02-12 PROCEDURE — 2580000003 HC RX 258

## 2025-02-12 PROCEDURE — 84145 PROCALCITONIN (PCT): CPT

## 2025-02-12 PROCEDURE — 94760 N-INVAS EAR/PLS OXIMETRY 1: CPT

## 2025-02-12 PROCEDURE — 6360000002 HC RX W HCPCS: Performed by: PERSONAL EMERGENCY RESPONSE ATTENDANT

## 2025-02-12 PROCEDURE — 36600 WITHDRAWAL OF ARTERIAL BLOOD: CPT

## 2025-02-12 PROCEDURE — 99285 EMERGENCY DEPT VISIT HI MDM: CPT

## 2025-02-12 PROCEDURE — 85025 COMPLETE CBC W/AUTO DIFF WBC: CPT

## 2025-02-12 PROCEDURE — 84484 ASSAY OF TROPONIN QUANT: CPT

## 2025-02-12 PROCEDURE — 82330 ASSAY OF CALCIUM: CPT

## 2025-02-12 PROCEDURE — 81001 URINALYSIS AUTO W/SCOPE: CPT

## 2025-02-12 PROCEDURE — 87186 SC STD MICRODIL/AGAR DIL: CPT

## 2025-02-12 PROCEDURE — 83605 ASSAY OF LACTIC ACID: CPT

## 2025-02-12 PROCEDURE — 96375 TX/PRO/DX INJ NEW DRUG ADDON: CPT

## 2025-02-12 PROCEDURE — 94640 AIRWAY INHALATION TREATMENT: CPT

## 2025-02-12 PROCEDURE — 87086 URINE CULTURE/COLONY COUNT: CPT

## 2025-02-12 PROCEDURE — 2580000003 HC RX 258: Performed by: PERSONAL EMERGENCY RESPONSE ATTENDANT

## 2025-02-12 PROCEDURE — 82803 BLOOD GASES ANY COMBINATION: CPT

## 2025-02-12 RX ORDER — ONDANSETRON 4 MG/1
4 TABLET, ORALLY DISINTEGRATING ORAL EVERY 8 HOURS PRN
Status: DISCONTINUED | OUTPATIENT
Start: 2025-02-12 | End: 2025-02-21 | Stop reason: HOSPADM

## 2025-02-12 RX ORDER — POLYETHYLENE GLYCOL 3350 17 G/17G
17 POWDER, FOR SOLUTION ORAL DAILY PRN
Status: DISCONTINUED | OUTPATIENT
Start: 2025-02-12 | End: 2025-02-21 | Stop reason: HOSPADM

## 2025-02-12 RX ORDER — MAGNESIUM SULFATE IN WATER 40 MG/ML
2000 INJECTION, SOLUTION INTRAVENOUS ONCE
Status: COMPLETED | OUTPATIENT
Start: 2025-02-12 | End: 2025-02-12

## 2025-02-12 RX ORDER — ENOXAPARIN SODIUM 100 MG/ML
40 INJECTION SUBCUTANEOUS DAILY
Status: DISCONTINUED | OUTPATIENT
Start: 2025-02-13 | End: 2025-02-13

## 2025-02-12 RX ORDER — GUAIFENESIN 600 MG/1
600 TABLET, EXTENDED RELEASE ORAL 2 TIMES DAILY
Status: DISCONTINUED | OUTPATIENT
Start: 2025-02-12 | End: 2025-02-21 | Stop reason: HOSPADM

## 2025-02-12 RX ORDER — 0.9 % SODIUM CHLORIDE 0.9 %
250 INTRAVENOUS SOLUTION INTRAVENOUS ONCE
Status: COMPLETED | OUTPATIENT
Start: 2025-02-12 | End: 2025-02-12

## 2025-02-12 RX ORDER — IPRATROPIUM BROMIDE AND ALBUTEROL SULFATE 2.5; .5 MG/3ML; MG/3ML
2 SOLUTION RESPIRATORY (INHALATION) CONTINUOUS PRN
Status: DISCONTINUED | OUTPATIENT
Start: 2025-02-12 | End: 2025-02-13

## 2025-02-12 RX ORDER — ACETAMINOPHEN 650 MG/1
650 SUPPOSITORY RECTAL EVERY 6 HOURS PRN
Status: DISCONTINUED | OUTPATIENT
Start: 2025-02-12 | End: 2025-02-21 | Stop reason: HOSPADM

## 2025-02-12 RX ORDER — MAGNESIUM SULFATE IN WATER 40 MG/ML
2000 INJECTION, SOLUTION INTRAVENOUS PRN
Status: DISCONTINUED | OUTPATIENT
Start: 2025-02-12 | End: 2025-02-21 | Stop reason: HOSPADM

## 2025-02-12 RX ORDER — SODIUM CHLORIDE 9 MG/ML
INJECTION, SOLUTION INTRAVENOUS CONTINUOUS
Status: DISCONTINUED | OUTPATIENT
Start: 2025-02-12 | End: 2025-02-13

## 2025-02-12 RX ORDER — POTASSIUM CHLORIDE 1500 MG/1
40 TABLET, EXTENDED RELEASE ORAL PRN
Status: DISCONTINUED | OUTPATIENT
Start: 2025-02-12 | End: 2025-02-13

## 2025-02-12 RX ORDER — OSELTAMIVIR PHOSPHATE 75 MG/1
75 CAPSULE ORAL 2 TIMES DAILY
Status: DISCONTINUED | OUTPATIENT
Start: 2025-02-12 | End: 2025-02-12

## 2025-02-12 RX ORDER — POTASSIUM CHLORIDE 7.45 MG/ML
10 INJECTION INTRAVENOUS PRN
Status: DISCONTINUED | OUTPATIENT
Start: 2025-02-12 | End: 2025-02-13

## 2025-02-12 RX ORDER — BENZONATATE 100 MG/1
100 CAPSULE ORAL 3 TIMES DAILY PRN
Status: DISCONTINUED | OUTPATIENT
Start: 2025-02-12 | End: 2025-02-21 | Stop reason: HOSPADM

## 2025-02-12 RX ORDER — ACETAMINOPHEN 325 MG/1
650 TABLET ORAL EVERY 6 HOURS PRN
Status: DISCONTINUED | OUTPATIENT
Start: 2025-02-12 | End: 2025-02-21 | Stop reason: HOSPADM

## 2025-02-12 RX ORDER — SODIUM CHLORIDE 0.9 % (FLUSH) 0.9 %
5-40 SYRINGE (ML) INJECTION EVERY 12 HOURS SCHEDULED
Status: DISCONTINUED | OUTPATIENT
Start: 2025-02-12 | End: 2025-02-21 | Stop reason: HOSPADM

## 2025-02-12 RX ORDER — SODIUM CHLORIDE 0.9 % (FLUSH) 0.9 %
5-40 SYRINGE (ML) INJECTION PRN
Status: DISCONTINUED | OUTPATIENT
Start: 2025-02-12 | End: 2025-02-21 | Stop reason: HOSPADM

## 2025-02-12 RX ORDER — OSELTAMIVIR PHOSPHATE 30 MG/1
30 CAPSULE ORAL 2 TIMES DAILY
Status: COMPLETED | OUTPATIENT
Start: 2025-02-13 | End: 2025-02-17

## 2025-02-12 RX ORDER — OSELTAMIVIR PHOSPHATE 75 MG/1
75 CAPSULE ORAL ONCE
Status: DISCONTINUED | OUTPATIENT
Start: 2025-02-12 | End: 2025-02-18

## 2025-02-12 RX ORDER — SODIUM CHLORIDE 9 MG/ML
INJECTION, SOLUTION INTRAVENOUS PRN
Status: DISCONTINUED | OUTPATIENT
Start: 2025-02-12 | End: 2025-02-21 | Stop reason: HOSPADM

## 2025-02-12 RX ORDER — ONDANSETRON 2 MG/ML
4 INJECTION INTRAMUSCULAR; INTRAVENOUS EVERY 6 HOURS PRN
Status: DISCONTINUED | OUTPATIENT
Start: 2025-02-12 | End: 2025-02-21 | Stop reason: HOSPADM

## 2025-02-12 RX ADMIN — SODIUM CHLORIDE: 9 INJECTION, SOLUTION INTRAVENOUS at 23:38

## 2025-02-12 RX ADMIN — MAGNESIUM SULFATE HEPTAHYDRATE 2000 MG: 40 INJECTION, SOLUTION INTRAVENOUS at 20:05

## 2025-02-12 RX ADMIN — METHYLPREDNISOLONE SODIUM SUCCINATE 125 MG: 125 INJECTION INTRAMUSCULAR; INTRAVENOUS at 18:58

## 2025-02-12 RX ADMIN — Medication 10 ML: at 23:41

## 2025-02-12 RX ADMIN — IPRATROPIUM BROMIDE AND ALBUTEROL SULFATE 2 DOSE: .5; 2.5 SOLUTION RESPIRATORY (INHALATION) at 18:33

## 2025-02-12 RX ADMIN — SODIUM CHLORIDE 250 ML: 9 INJECTION, SOLUTION INTRAVENOUS at 20:38

## 2025-02-12 ASSESSMENT — PAIN SCALES - GENERAL: PAINLEVEL_OUTOF10: 0

## 2025-02-12 NOTE — ED TRIAGE NOTES
Pt daughter called ems due to pt having a \"bad cough\"  Pt has a wet cough at this time   Pt SPO2 is 92% on

## 2025-02-12 NOTE — ED PROVIDER NOTES
[facility] EMERGENCY DEPARTMENT  eMERGENCY dEPARTMENT eNCOUnter      Pt Name: Rayna Seals  MRN: [id number]  Birthdate 1940  Date of evaluation: 2/12/2025  Provider: RULA BROWN  6:06 PM EST     My attending is Dr. Harrison.    HISTORY OF PRESENT ILLNESS    Rayna Seals is a 84 y.o. female with PMHx of CVA, dementia, anxiety depression, subdural hemorrhage presents to the emergency department with cough.  For 3 days patient has had runny nose and wet cough.  Yesterday started with loose stools.  There has been no fevers, chest pain, shortness of breath, abdominal pain, nausea, vomiting, no urinary symptoms.  Patient has dementia and poor historian.  Daughter at bedside.  Daughter helps transfer patient from bed to wheelchair. Recently in rehab facility.     I spoke to daughter at bedside who is caregiver and she has had previous conversation with patient prior to dementia and patient did not want any prolonged interventions to keep her alive.  Patient will be DNR CCA.  Paper filled out, placed on chart, and copy given to daughter.       [facility]    Nursing Notes were reviewed.    REVIEW OF SYSTEMS       Review of Systems   Constitutional:  Negative for appetite change, chills and fever.   HENT:  Negative for congestion, rhinorrhea and sore throat.    Respiratory:  Positive for cough. Negative for shortness of breath.    Cardiovascular:  Negative for chest pain.   Gastrointestinal:  Negative for abdominal pain, blood in stool, diarrhea, nausea and vomiting.   Genitourinary:  Negative for difficulty urinating.   Musculoskeletal:  Negative for neck stiffness.   Skin:  Negative for color change and rash.   Neurological:  Negative for dizziness, syncope, weakness, light-headedness, numbness and headaches.   All other systems reviewed and are negative.            PAST MEDICAL HISTORY     Past Medical History:   Diagnosis Date    Anxiety and depression     Cerebral artery occlusion with cerebral infarction (HCC)

## 2025-02-13 ENCOUNTER — APPOINTMENT (OUTPATIENT)
Dept: CT IMAGING | Age: 85
DRG: 193 | End: 2025-02-13
Payer: MEDICARE

## 2025-02-13 ENCOUNTER — APPOINTMENT (OUTPATIENT)
Dept: ULTRASOUND IMAGING | Age: 85
DRG: 193 | End: 2025-02-13
Payer: MEDICARE

## 2025-02-13 LAB
ALBUMIN SERPL-MCNC: 2.9 G/DL (ref 3.5–4.6)
ALP SERPL-CCNC: 82 U/L (ref 40–130)
ALT SERPL-CCNC: 15 U/L (ref 0–33)
AMMONIA PLAS-SCNC: 14 UMOL/L (ref 11–51)
ANION GAP SERPL CALCULATED.3IONS-SCNC: 10 MEQ/L (ref 9–15)
ANTI-XA UNFRAC HEPARIN: 1.48 IU/ML
ANTI-XA UNFRAC HEPARIN: <0.1 IU/ML
APTT PPP: 25.9 SEC (ref 24.4–36.8)
AST SERPL-CCNC: 27 U/L (ref 0–35)
BASE EXCESS ARTERIAL: 7 (ref -3–3)
BASOPHILS # BLD: 0 K/UL (ref 0–0.2)
BASOPHILS NFR BLD: 0 %
BILIRUB SERPL-MCNC: 0.3 MG/DL (ref 0.2–0.7)
BUN SERPL-MCNC: 24 MG/DL (ref 8–23)
CALCIUM IONIZED: 1.22 MMOL/L (ref 1.12–1.32)
CALCIUM SERPL-MCNC: 8.2 MG/DL (ref 8.5–9.9)
CHLORIDE SERPL-SCNC: 108 MEQ/L (ref 95–107)
CO2 SERPL-SCNC: 27 MEQ/L (ref 20–31)
CREAT SERPL-MCNC: 0.93 MG/DL (ref 0.5–0.9)
D DIMER PPP FEU-MCNC: 0.91 MG/L FEU (ref 0–0.5)
EKG ATRIAL RATE: 80 BPM
EKG P AXIS: 69 DEGREES
EKG P-R INTERVAL: 126 MS
EKG Q-T INTERVAL: 402 MS
EKG QRS DURATION: 70 MS
EKG QTC CALCULATION (BAZETT): 463 MS
EKG R AXIS: 15 DEGREES
EKG T AXIS: 29 DEGREES
EKG VENTRICULAR RATE: 80 BPM
EOSINOPHIL # BLD: 0 K/UL (ref 0–0.7)
EOSINOPHIL NFR BLD: 0 %
ERYTHROCYTE [DISTWIDTH] IN BLOOD BY AUTOMATED COUNT: 14.5 % (ref 11.5–14.5)
GLOBULIN SER CALC-MCNC: 3.2 G/DL (ref 2.3–3.5)
GLUCOSE BLD-MCNC: 133 MG/DL (ref 70–99)
GLUCOSE BLD-MCNC: 164 MG/DL (ref 70–99)
GLUCOSE SERPL-MCNC: 168 MG/DL (ref 70–99)
HCO3 ARTERIAL: 31.7 MMOL/L (ref 21–29)
HCT VFR BLD AUTO: 35 % (ref 36–48)
HCT VFR BLD AUTO: 36 % (ref 37–47)
HGB BLD CALC-MCNC: 11.8 GM/DL (ref 12–16)
HGB BLD-MCNC: 11.5 G/DL (ref 12–16)
INR PPP: 1
LACTATE: 0.68 MMOL/L (ref 0.4–2)
LYMPHOCYTES # BLD: 0.5 K/UL (ref 1–4.8)
LYMPHOCYTES NFR BLD: 7.2 %
MAGNESIUM SERPL-MCNC: 2.6 MG/DL (ref 1.7–2.4)
MCH RBC QN AUTO: 28.4 PG (ref 27–31.3)
MCHC RBC AUTO-ENTMCNC: 31.9 % (ref 33–37)
MCV RBC AUTO: 88.9 FL (ref 79.4–94.8)
MONOCYTES # BLD: 0.1 K/UL (ref 0.2–0.8)
MONOCYTES NFR BLD: 2 %
NEUTROPHILS # BLD: 5.8 K/UL (ref 1.4–6.5)
NEUTS SEG NFR BLD: 90.5 %
O2 SAT, ARTERIAL: 97 % (ref 93–100)
PCO2 ARTERIAL: 53 MM HG (ref 35–45)
PERFORMED ON: ABNORMAL
PERFORMED ON: ABNORMAL
PH ARTERIAL: 7.39 (ref 7.35–7.45)
PLATELET # BLD AUTO: 210 K/UL (ref 130–400)
PO2 ARTERIAL: 92 MM HG (ref 75–108)
POC CHLORIDE: 110 MEQ/L (ref 99–110)
POC CREATININE: 1 MG/DL (ref 0.6–1.2)
POC FIO2: 55
POC SAMPLE TYPE: ABNORMAL
POTASSIUM SERPL-SCNC: 3.9 MEQ/L (ref 3.5–5.1)
POTASSIUM SERPL-SCNC: 4.1 MEQ/L (ref 3.4–4.9)
PROT SERPL-MCNC: 6.1 G/DL (ref 6.3–8)
PROTHROMBIN TIME: 14 SEC (ref 12.3–14.9)
RBC # BLD AUTO: 4.05 M/UL (ref 4.2–5.4)
SODIUM BLD-SCNC: 149 MEQ/L (ref 136–145)
SODIUM SERPL-SCNC: 145 MEQ/L (ref 135–144)
TCO2 ARTERIAL: 33 MMOL/L (ref 21–32)
WBC # BLD AUTO: 6.4 K/UL (ref 4.8–10.8)

## 2025-02-13 PROCEDURE — 6360000002 HC RX W HCPCS: Performed by: INTERNAL MEDICINE

## 2025-02-13 PROCEDURE — 83605 ASSAY OF LACTIC ACID: CPT

## 2025-02-13 PROCEDURE — 2580000003 HC RX 258: Performed by: INTERNAL MEDICINE

## 2025-02-13 PROCEDURE — 94640 AIRWAY INHALATION TREATMENT: CPT

## 2025-02-13 PROCEDURE — 94761 N-INVAS EAR/PLS OXIMETRY MLT: CPT

## 2025-02-13 PROCEDURE — 80053 COMPREHEN METABOLIC PANEL: CPT

## 2025-02-13 PROCEDURE — 99222 1ST HOSP IP/OBS MODERATE 55: CPT | Performed by: PSYCHIATRY & NEUROLOGY

## 2025-02-13 PROCEDURE — 2500000003 HC RX 250 WO HCPCS

## 2025-02-13 PROCEDURE — 93970 EXTREMITY STUDY: CPT

## 2025-02-13 PROCEDURE — 71275 CT ANGIOGRAPHY CHEST: CPT

## 2025-02-13 PROCEDURE — 85025 COMPLETE CBC W/AUTO DIFF WBC: CPT

## 2025-02-13 PROCEDURE — 85730 THROMBOPLASTIN TIME PARTIAL: CPT

## 2025-02-13 PROCEDURE — 6370000000 HC RX 637 (ALT 250 FOR IP): Performed by: INTERNAL MEDICINE

## 2025-02-13 PROCEDURE — 97162 PT EVAL MOD COMPLEX 30 MIN: CPT

## 2025-02-13 PROCEDURE — 2700000000 HC OXYGEN THERAPY PER DAY

## 2025-02-13 PROCEDURE — 5A0945A ASSISTANCE WITH RESPIRATORY VENTILATION, 24-96 CONSECUTIVE HOURS, HIGH NASAL FLOW/VELOCITY: ICD-10-PCS | Performed by: INTERNAL MEDICINE

## 2025-02-13 PROCEDURE — 6370000000 HC RX 637 (ALT 250 FOR IP)

## 2025-02-13 PROCEDURE — 82140 ASSAY OF AMMONIA: CPT

## 2025-02-13 PROCEDURE — 94760 N-INVAS EAR/PLS OXIMETRY 1: CPT

## 2025-02-13 PROCEDURE — 2060000000 HC ICU INTERMEDIATE R&B

## 2025-02-13 PROCEDURE — 85379 FIBRIN DEGRADATION QUANT: CPT

## 2025-02-13 PROCEDURE — APPSS45 APP SPLIT SHARED TIME 31-45 MINUTES: Performed by: NURSE PRACTITIONER

## 2025-02-13 PROCEDURE — 82565 ASSAY OF CREATININE: CPT

## 2025-02-13 PROCEDURE — 85014 HEMATOCRIT: CPT

## 2025-02-13 PROCEDURE — 6360000004 HC RX CONTRAST MEDICATION

## 2025-02-13 PROCEDURE — 92610 EVALUATE SWALLOWING FUNCTION: CPT

## 2025-02-13 PROCEDURE — 82330 ASSAY OF CALCIUM: CPT

## 2025-02-13 PROCEDURE — 82435 ASSAY OF BLOOD CHLORIDE: CPT

## 2025-02-13 PROCEDURE — 36415 COLL VENOUS BLD VENIPUNCTURE: CPT

## 2025-02-13 PROCEDURE — 84132 ASSAY OF SERUM POTASSIUM: CPT

## 2025-02-13 PROCEDURE — 83735 ASSAY OF MAGNESIUM: CPT

## 2025-02-13 PROCEDURE — 85520 HEPARIN ASSAY: CPT

## 2025-02-13 PROCEDURE — 2500000003 HC RX 250 WO HCPCS: Performed by: INTERNAL MEDICINE

## 2025-02-13 PROCEDURE — 6360000002 HC RX W HCPCS

## 2025-02-13 PROCEDURE — 36600 WITHDRAWAL OF ARTERIAL BLOOD: CPT

## 2025-02-13 PROCEDURE — 84295 ASSAY OF SERUM SODIUM: CPT

## 2025-02-13 PROCEDURE — 85610 PROTHROMBIN TIME: CPT

## 2025-02-13 PROCEDURE — 97166 OT EVAL MOD COMPLEX 45 MIN: CPT

## 2025-02-13 PROCEDURE — 82803 BLOOD GASES ANY COMBINATION: CPT

## 2025-02-13 RX ORDER — HYDRALAZINE HYDROCHLORIDE 20 MG/ML
10 INJECTION INTRAMUSCULAR; INTRAVENOUS EVERY 4 HOURS PRN
Status: DISCONTINUED | OUTPATIENT
Start: 2025-02-13 | End: 2025-02-21 | Stop reason: HOSPADM

## 2025-02-13 RX ORDER — HEPARIN SODIUM 1000 [USP'U]/ML
80 INJECTION, SOLUTION INTRAVENOUS; SUBCUTANEOUS ONCE
Status: COMPLETED | OUTPATIENT
Start: 2025-02-13 | End: 2025-02-13

## 2025-02-13 RX ORDER — HEPARIN SODIUM 1000 [USP'U]/ML
80 INJECTION, SOLUTION INTRAVENOUS; SUBCUTANEOUS PRN
Status: DISCONTINUED | OUTPATIENT
Start: 2025-02-13 | End: 2025-02-13

## 2025-02-13 RX ORDER — IPRATROPIUM BROMIDE AND ALBUTEROL SULFATE 2.5; .5 MG/3ML; MG/3ML
1 SOLUTION RESPIRATORY (INHALATION) 2 TIMES DAILY
Status: DISCONTINUED | OUTPATIENT
Start: 2025-02-13 | End: 2025-02-16

## 2025-02-13 RX ORDER — SERTRALINE HYDROCHLORIDE 25 MG/1
25 TABLET, FILM COATED ORAL DAILY
Status: DISCONTINUED | OUTPATIENT
Start: 2025-02-13 | End: 2025-02-21 | Stop reason: HOSPADM

## 2025-02-13 RX ORDER — ENOXAPARIN SODIUM 100 MG/ML
1 INJECTION SUBCUTANEOUS 2 TIMES DAILY
Status: COMPLETED | OUTPATIENT
Start: 2025-02-13 | End: 2025-02-13

## 2025-02-13 RX ORDER — HEPARIN SODIUM 1000 [USP'U]/ML
40 INJECTION, SOLUTION INTRAVENOUS; SUBCUTANEOUS PRN
Status: DISCONTINUED | OUTPATIENT
Start: 2025-02-13 | End: 2025-02-13

## 2025-02-13 RX ORDER — IOPAMIDOL 612 MG/ML
75 INJECTION, SOLUTION INTRAVASCULAR
Status: COMPLETED | OUTPATIENT
Start: 2025-02-13 | End: 2025-02-13

## 2025-02-13 RX ORDER — HEPARIN SODIUM 1000 [USP'U]/ML
80 INJECTION, SOLUTION INTRAVENOUS; SUBCUTANEOUS ONCE
Status: DISCONTINUED | OUTPATIENT
Start: 2025-02-13 | End: 2025-02-13

## 2025-02-13 RX ORDER — HEPARIN SODIUM 10000 [USP'U]/100ML
5-30 INJECTION, SOLUTION INTRAVENOUS CONTINUOUS
Status: DISCONTINUED | OUTPATIENT
Start: 2025-02-13 | End: 2025-02-13

## 2025-02-13 RX ORDER — LEVETIRACETAM 500 MG/1
500 TABLET ORAL 2 TIMES DAILY
Status: DISCONTINUED | OUTPATIENT
Start: 2025-02-13 | End: 2025-02-21 | Stop reason: HOSPADM

## 2025-02-13 RX ADMIN — IPRATROPIUM BROMIDE AND ALBUTEROL SULFATE 1 DOSE: .5; 2.5 SOLUTION RESPIRATORY (INHALATION) at 20:05

## 2025-02-13 RX ADMIN — HYDRALAZINE HYDROCHLORIDE 10 MG: 20 INJECTION INTRAMUSCULAR; INTRAVENOUS at 23:52

## 2025-02-13 RX ADMIN — HEPARIN SODIUM 18 UNITS/KG/HR: 10000 INJECTION, SOLUTION INTRAVENOUS at 06:28

## 2025-02-13 RX ADMIN — ENOXAPARIN SODIUM 50 MG: 100 INJECTION SUBCUTANEOUS at 10:35

## 2025-02-13 RX ADMIN — Medication 10 ML: at 08:48

## 2025-02-13 RX ADMIN — IPRATROPIUM BROMIDE AND ALBUTEROL SULFATE 1 DOSE: .5; 2.5 SOLUTION RESPIRATORY (INHALATION) at 10:01

## 2025-02-13 RX ADMIN — IOPAMIDOL 75 ML: 612 INJECTION, SOLUTION INTRAVENOUS at 05:19

## 2025-02-13 RX ADMIN — WATER 1000 MG: 1 INJECTION INTRAMUSCULAR; INTRAVENOUS; SUBCUTANEOUS at 12:36

## 2025-02-13 RX ADMIN — OSELTAMIVIR PHOSPHATE 30 MG: 30 CAPSULE ORAL at 08:47

## 2025-02-13 RX ADMIN — Medication 10 ML: at 22:27

## 2025-02-13 RX ADMIN — GUAIFENESIN 600 MG: 600 TABLET ORAL at 22:28

## 2025-02-13 RX ADMIN — DOXYCYCLINE 100 MG: 100 INJECTION, POWDER, LYOPHILIZED, FOR SOLUTION INTRAVENOUS at 21:59

## 2025-02-13 RX ADMIN — DOXYCYCLINE 100 MG: 100 INJECTION, POWDER, LYOPHILIZED, FOR SOLUTION INTRAVENOUS at 12:45

## 2025-02-13 RX ADMIN — LEVETIRACETAM 500 MG: 500 TABLET, FILM COATED ORAL at 22:28

## 2025-02-13 RX ADMIN — SERTRALINE 25 MG: 25 TABLET, FILM COATED ORAL at 10:35

## 2025-02-13 RX ADMIN — OSELTAMIVIR PHOSPHATE 30 MG: 30 CAPSULE ORAL at 22:28

## 2025-02-13 RX ADMIN — ENOXAPARIN SODIUM 50 MG: 100 INJECTION SUBCUTANEOUS at 22:28

## 2025-02-13 RX ADMIN — GUAIFENESIN 600 MG: 600 TABLET ORAL at 08:48

## 2025-02-13 RX ADMIN — LEVETIRACETAM 500 MG: 500 TABLET, FILM COATED ORAL at 10:35

## 2025-02-13 RX ADMIN — HEPARIN SODIUM 4200 UNITS: 1000 INJECTION INTRAVENOUS; SUBCUTANEOUS at 06:26

## 2025-02-13 ASSESSMENT — PAIN SCALES - GENERAL: PAINLEVEL_OUTOF10: 0

## 2025-02-13 NOTE — CARE COORDINATION
Case Management Assessment  Initial Evaluation    Date/Time of Evaluation: 2/13/2025 12:45 PM  Assessment Completed by: ELIZABET Ernandez    If patient is discharged prior to next notation, then this note serves as note for discharge by case management.    Patient Name: Rayna Seals                   YOB: 1940  Diagnosis: Shortness of breath [R06.02]  Hypoxemia [R09.02]  Influenza A [J10.1]                   Date / Time: 2/12/2025  5:31 PM    Patient Admission Status: Inpatient   Readmission Risk (Low < 19, Mod (19-27), High > 27): Readmission Risk Score: 14.8    Current PCP: Britni Davenport MD  PCP verified by CM? Yes    Chart Reviewed: Yes      History Provided by: Patient  Patient Orientation: Alert and Oriented    Patient Cognition: Alert    Hospitalization in the last 30 days (Readmission):  Yes    If yes, Readmission Assessment in CM Navigator will be completed.    Advance Directives:      Code Status: DNR-CCA   Patient's Primary Decision Maker is: Legal Next of Kin    Primary Decision Maker: COCO SEALS  Child - [phone]    Secondary Decision Maker: Tanesha Mann  Child - [phone]    Discharge Planning:    Patient lives with: Children Type of Home: House  Primary Care Giver: Self  Patient Support Systems include: Children   Current Financial resources: Medicare  Current community resources: None  Current services prior to admission: None            Current DME:              Type of Home Care services:  Hospice    ADLS  Prior functional level: Assistance with the following:, Mobility  Current functional level: Assistance with the following:, Mobility    PT AM-PAC:   /24  OT AM-PAC:   /24    Family can provide assistance at DC: Yes  Would you like Case Management to discuss the discharge plan with any other family members/significant others, and if so, who? Yes (DAUGHTER, KAYLEE)  Plans to Return to Present Housing: Yes  Other Identified Issues/Barriers to RETURNING to current housing:

## 2025-02-13 NOTE — CARE COORDINATION
Patient admitted with pneumonia and influenza. Pneumonia booklet and zone pamphlet mailed to patient residence due to being in isolation at this time.  Electronically signed by Fermin Grady RN on 2/13/2025 at 5:05 PM

## 2025-02-13 NOTE — ACP (ADVANCE CARE PLANNING)
Advance Care Planning   Healthcare Decision Maker:    Primary Decision Maker: COCO RUIZ - Child - [phone]    Secondary Decision Maker: Juan LuisfabiTanesha - Child - [phone]    Click here to complete Healthcare Decision Makers including selection of the Healthcare Decision Maker Relationship (ie \"Primary\").  Today we documented Decision Maker(s) consistent with Legal Next of Kin hierarchy.       Electronically signed by ELIZABET Ernandez on 2/13/2025 at 12:43 PM

## 2025-02-13 NOTE — H&P
Hospitalist Group   History and Physical    Attending: Dr. Yusuf    CHIEF COMPLAINT:  Cough    History of Present Illness:  Rayna Seals is a 84 y.o. female with a PMHx of CVA, dementia, anxiety, depression, and subdural hemorrhage per the patient and EMR review, presents with a chief complaint of cough.  Upon my assessment, patient is somewhat lethargic and needs to be sternal rubbed to verbally respond.  She is a poor historian.  Unable to participate in HPI/ROS.  Information from ED provider.  Per ED provider, daughter states the patient has had a wet cough, rhinorrhea, and loose stool x 3 days.  They denied fevers, chest pain, shortness of breath, nausea, vomiting, UTI-like symptoms.    REVIEW OF SYSTEMS:  Unable to complete.    PMH:  Past Medical History:   Diagnosis Date    Anxiety and depression     Cerebral artery occlusion with cerebral infarction (HCC)     Dementia (HCC)     Subdural hemorrhage (HCC)        Surgical History:  Past Surgical History:   Procedure Laterality Date    ANKLE FRACTURE SURGERY Left     CHOLECYSTECTOMY      TUBAL LIGATION         Medications Prior to Admission:    Prior to Admission medications    Medication Sig Start Date End Date Taking? Authorizing Provider   losartan (COZAAR) 25 MG tablet Take 1 tablet by mouth daily 1/17/25   Chase Harris,    levETIRAcetam (KEPPRA) 500 MG tablet Take 1 tablet by mouth 2 times daily    ProviderJeremy MD   sertraline (ZOLOFT) 50 MG tablet Take 0.5 tablets by mouth daily 1/6/23   Jeremy Payton MD       Allergies:    Nuts [peanut-containing drug products] and Shellfish-derived products    Social History:    reports that she has quit smoking. She has been exposed to tobacco smoke. She has never used smokeless tobacco. She reports that she does not currently use alcohol. She reports that she does not use drugs.    Family History:  No family history on file.    PHYSICAL EXAM:   Vitals:  BP 93/62   Pulse 71   Temp 98.1 °F

## 2025-02-13 NOTE — PLAN OF CARE
Nutrition Problem #1: Moderate malnutrition, in context of social or environmental circumstances  Intervention: Food and/or Nutrient Delivery: Continue Current Diet, Continue Oral Nutrition Supplement      Problem: Nutrition Deficit:  Goal: Optimize nutritional status  Flowsheets (Taken 2/13/2025 6351)  Nutrient intake appropriate for improving, restoring, or maintaining nutritional needs:   Assess nutritional status and recommend course of action   Monitor oral intake, labs, and treatment plans   Recommend appropriate diets, oral nutritional supplements, and vitamin/mineral supplements   Provide specific nutrition education to patient or family as appropriate

## 2025-02-13 NOTE — CONSULTS
Spiritual Health History and Assessment/Progress Note  [facility]    Initial Encounter, Spiritual/Emotional Needs,  ,  ,      Name: Rayna Seals MRN: [id number]    Age: 84 y.o.     Sex: female   Language: English   Congregational: Zoroastrianism   Influenza A     Date: 2/13/2025            Total Time Calculated: 15 min              Spiritual Assessment began in [facility]        Referral/Consult From: Physician   Encounter Overview/Reason: Initial Encounter, Spiritual/Emotional Needs  Service Provided For: Patient    REFERRAL: Spiritual Consult.  ASSESSMENT: The pt was reclining on her bed, the pt seem to be a little confused, the pt was open to having a short visit with the , No family was present, but the pt does have a daughter who was in to visit her early this morning, the pt shared that she was not feeling too good but did request for prayer.   INTERVENTION:  The  provided emotional and spiritual care through active listening, theological reflection, the pt's cierra and offered prayer in accordance to the pt's cierra preference (Zoroastrianism). OUTCOME: The pt was appreciative for the visit, prayers, and for taking the time to listen to her.    PLAN:  No plan at the moment, but the  will remain available for the pt.     Cierra, Belief, Meaning:   Patient identifies as spiritual, is connected with a cierra tradition or spiritual practice, and has beliefs or practices that help with coping during difficult times  Family/Friends No family/friends present      Importance and Influence:  Patient has spiritual/personal beliefs that influence decisions regarding their health  Family/Friends No family/friends present    Community:  Patient is connected with a spiritual community and feels well-supported. Support system includes: Children  Family/Friends No family/friends present    Assessment and Plan of Care:     Patient Interventions include: Facilitated expression of thoughts and

## 2025-02-13 NOTE — CONSULTS
[facility] Consult Note  Name: Rayna Seals  Age: 84 y.o.  Gender: female  CodeStatus: DNR-CCA  Allergies: Nuts [Peanut-Containing Drug Products]  Shellfish-Derived Products    Chief Complaint:Illness (Daughter felt pt sound bad)    Primary Care Provider: Britni Davenport MD  InpatientTreatment Team: Treatment Team:   Jade Aviles MD Patel, MD Armen Torres Deanna L, RN Rosso, Jennifer A, RN Doskocil, Rain, RN Xia, Semies, Katrina Bocye RN  Admission Date: 2/12/2025      HPI   Consulting provider: Alisha Cordon for lethargy, change in mental status  Pt seen and examined for neurology consult.  Patient is a 84-year-old female with past medical history of anxiety and depression, left PCA CVA in 2017, vascular dementia, traumatic subdural hemorrhage who presented to [facility] emergency room on 2/12/2025 due to cough, runny nose, loose stools.  While in the emergency room patient noted to be hypoxic.  Required high flow O2.  Vital signs in the emergency room 122/64, 76, 19, 98.1 °F.  CT of the head negative for acute findings.  EKG showed sinus rhythm at 80 bpm.  Chest x-ray negative for acute findings.  Patient positive for influenza A.  Lab testing remarkable for creatinine of 1.29, high-sensitivity troponin of 31  CTA of the chest positive for pulmonary embolism.  Patient started on therapeutic Lovenox dosing.    Patient seen and examined events as noted above.  Patient with influenza  Vitals:    02/13/25 0814   BP:    Pulse: 60   Resp: 20   Temp:    SpO2: 98%        Review of Systems   Constitutional:  Positive for fatigue. Negative for fever.   HENT:  Positive for hearing loss. Negative for trouble swallowing.    Eyes:  Negative for visual disturbance.   Respiratory:  Positive for cough. Negative for chest tightness, shortness of breath and wheezing.    Cardiovascular:  Negative for chest pain and palpitations.   Gastrointestinal:  Negative for nausea and vomiting.   Skin:  Negative for color

## 2025-02-14 ENCOUNTER — APPOINTMENT (OUTPATIENT)
Dept: GENERAL RADIOLOGY | Age: 85
DRG: 193 | End: 2025-02-14
Attending: INTERNAL MEDICINE
Payer: MEDICARE

## 2025-02-14 LAB
ALBUMIN SERPL-MCNC: 2.7 G/DL (ref 3.5–4.6)
ALP SERPL-CCNC: 70 U/L (ref 40–130)
ALT SERPL-CCNC: 10 U/L (ref 0–33)
ANION GAP SERPL CALCULATED.3IONS-SCNC: 7 MEQ/L (ref 9–15)
AST SERPL-CCNC: 16 U/L (ref 0–35)
BACTERIA UR CULT: ABNORMAL
BACTERIA UR CULT: ABNORMAL
BASOPHILS # BLD: 0 K/UL (ref 0–0.2)
BASOPHILS NFR BLD: 0.2 %
BILIRUB SERPL-MCNC: <0.2 MG/DL (ref 0.2–0.7)
BUN SERPL-MCNC: 19 MG/DL (ref 8–23)
CALCIUM SERPL-MCNC: 8.1 MG/DL (ref 8.5–9.9)
CHLORIDE SERPL-SCNC: 111 MEQ/L (ref 95–107)
CO2 SERPL-SCNC: 28 MEQ/L (ref 20–31)
CREAT SERPL-MCNC: 0.63 MG/DL (ref 0.5–0.9)
EKG ATRIAL RATE: 133 BPM
EKG Q-T INTERVAL: 280 MS
EKG QRS DURATION: 82 MS
EKG QTC CALCULATION (BAZETT): 406 MS
EKG R AXIS: 52 DEGREES
EKG T AXIS: 31 DEGREES
EKG VENTRICULAR RATE: 127 BPM
EOSINOPHIL # BLD: 0.1 K/UL (ref 0–0.7)
EOSINOPHIL NFR BLD: 1.5 %
ERYTHROCYTE [DISTWIDTH] IN BLOOD BY AUTOMATED COUNT: 14.3 % (ref 11.5–14.5)
GLOBULIN SER CALC-MCNC: 2.9 G/DL (ref 2.3–3.5)
GLUCOSE SERPL-MCNC: 88 MG/DL (ref 70–99)
HCT VFR BLD AUTO: 36.4 % (ref 37–47)
HGB BLD-MCNC: 11.5 G/DL (ref 12–16)
LYMPHOCYTES # BLD: 1.4 K/UL (ref 1–4.8)
LYMPHOCYTES NFR BLD: 15 %
MAGNESIUM SERPL-MCNC: 2 MG/DL (ref 1.7–2.4)
MCH RBC QN AUTO: 27.7 PG (ref 27–31.3)
MCHC RBC AUTO-ENTMCNC: 31.6 % (ref 33–37)
MCV RBC AUTO: 87.7 FL (ref 79.4–94.8)
MONOCYTES # BLD: 0.5 K/UL (ref 0.2–0.8)
MONOCYTES NFR BLD: 5.2 %
NEUTROPHILS # BLD: 7.1 K/UL (ref 1.4–6.5)
NEUTS SEG NFR BLD: 77.6 %
ORGANISM: ABNORMAL
PLATELET # BLD AUTO: 231 K/UL (ref 130–400)
POTASSIUM SERPL-SCNC: 3.6 MEQ/L (ref 3.4–4.9)
PROT SERPL-MCNC: 5.6 G/DL (ref 6.3–8)
RBC # BLD AUTO: 4.15 M/UL (ref 4.2–5.4)
SODIUM SERPL-SCNC: 146 MEQ/L (ref 135–144)
WBC # BLD AUTO: 9.2 K/UL (ref 4.8–10.8)

## 2025-02-14 PROCEDURE — 2500000003 HC RX 250 WO HCPCS: Performed by: INTERNAL MEDICINE

## 2025-02-14 PROCEDURE — 85025 COMPLETE CBC W/AUTO DIFF WBC: CPT

## 2025-02-14 PROCEDURE — 6360000002 HC RX W HCPCS: Performed by: INTERNAL MEDICINE

## 2025-02-14 PROCEDURE — 2500000003 HC RX 250 WO HCPCS: Performed by: RADIOLOGY

## 2025-02-14 PROCEDURE — 2580000003 HC RX 258: Performed by: INTERNAL MEDICINE

## 2025-02-14 PROCEDURE — 94761 N-INVAS EAR/PLS OXIMETRY MLT: CPT

## 2025-02-14 PROCEDURE — 94760 N-INVAS EAR/PLS OXIMETRY 1: CPT

## 2025-02-14 PROCEDURE — 2060000000 HC ICU INTERMEDIATE R&B

## 2025-02-14 PROCEDURE — 6370000000 HC RX 637 (ALT 250 FOR IP)

## 2025-02-14 PROCEDURE — 36415 COLL VENOUS BLD VENIPUNCTURE: CPT

## 2025-02-14 PROCEDURE — 6370000000 HC RX 637 (ALT 250 FOR IP): Performed by: INTERNAL MEDICINE

## 2025-02-14 PROCEDURE — 80053 COMPREHEN METABOLIC PANEL: CPT

## 2025-02-14 PROCEDURE — 92526 ORAL FUNCTION THERAPY: CPT

## 2025-02-14 PROCEDURE — 2500000003 HC RX 250 WO HCPCS

## 2025-02-14 PROCEDURE — 74230 X-RAY XM SWLNG FUNCJ C+: CPT

## 2025-02-14 PROCEDURE — 83735 ASSAY OF MAGNESIUM: CPT

## 2025-02-14 PROCEDURE — 99231 SBSQ HOSP IP/OBS SF/LOW 25: CPT | Performed by: PSYCHIATRY & NEUROLOGY

## 2025-02-14 PROCEDURE — 2700000000 HC OXYGEN THERAPY PER DAY

## 2025-02-14 PROCEDURE — 92611 MOTION FLUOROSCOPY/SWALLOW: CPT

## 2025-02-14 PROCEDURE — 93005 ELECTROCARDIOGRAM TRACING: CPT

## 2025-02-14 PROCEDURE — 94640 AIRWAY INHALATION TREATMENT: CPT

## 2025-02-14 RX ORDER — METOPROLOL TARTRATE 1 MG/ML
5 INJECTION, SOLUTION INTRAVENOUS
Status: ACTIVE | OUTPATIENT
Start: 2025-02-14 | End: 2025-02-14

## 2025-02-14 RX ORDER — METOPROLOL TARTRATE 25 MG/1
25 TABLET, FILM COATED ORAL 3 TIMES DAILY
Status: DISCONTINUED | OUTPATIENT
Start: 2025-02-14 | End: 2025-02-15

## 2025-02-14 RX ORDER — SODIUM CHLORIDE 450 MG/100ML
INJECTION, SOLUTION INTRAVENOUS CONTINUOUS
Status: DISPENSED | OUTPATIENT
Start: 2025-02-14 | End: 2025-02-15

## 2025-02-14 RX ORDER — POTASSIUM CHLORIDE 1500 MG/1
40 TABLET, EXTENDED RELEASE ORAL ONCE
Status: COMPLETED | OUTPATIENT
Start: 2025-02-14 | End: 2025-02-14

## 2025-02-14 RX ADMIN — IPRATROPIUM BROMIDE AND ALBUTEROL SULFATE 1 DOSE: .5; 2.5 SOLUTION RESPIRATORY (INHALATION) at 19:30

## 2025-02-14 RX ADMIN — IPRATROPIUM BROMIDE AND ALBUTEROL SULFATE 1 DOSE: .5; 2.5 SOLUTION RESPIRATORY (INHALATION) at 07:41

## 2025-02-14 RX ADMIN — SERTRALINE 25 MG: 25 TABLET, FILM COATED ORAL at 08:38

## 2025-02-14 RX ADMIN — SODIUM CHLORIDE: 0.45 INJECTION, SOLUTION INTRAVENOUS at 15:11

## 2025-02-14 RX ADMIN — METOPROLOL TARTRATE 25 MG: 25 TABLET, FILM COATED ORAL at 20:15

## 2025-02-14 RX ADMIN — BARIUM SULFATE 50 ML: 0.81 POWDER, FOR SUSPENSION ORAL at 14:11

## 2025-02-14 RX ADMIN — BARIUM SULFATE 80 ML: 400 SUSPENSION ORAL at 14:11

## 2025-02-14 RX ADMIN — DILTIAZEM HYDROCHLORIDE 5 MG/HR: 5 INJECTION, SOLUTION INTRAVENOUS at 08:54

## 2025-02-14 RX ADMIN — DILTIAZEM HYDROCHLORIDE 10 MG/HR: 5 INJECTION, SOLUTION INTRAVENOUS at 15:08

## 2025-02-14 RX ADMIN — POTASSIUM CHLORIDE 40 MEQ: 1500 TABLET, EXTENDED RELEASE ORAL at 15:06

## 2025-02-14 RX ADMIN — OSELTAMIVIR PHOSPHATE 30 MG: 30 CAPSULE ORAL at 20:14

## 2025-02-14 RX ADMIN — GUAIFENESIN 600 MG: 600 TABLET ORAL at 20:15

## 2025-02-14 RX ADMIN — DOXYCYCLINE 100 MG: 100 INJECTION, POWDER, LYOPHILIZED, FOR SOLUTION INTRAVENOUS at 09:10

## 2025-02-14 RX ADMIN — METOPROLOL TARTRATE 25 MG: 25 TABLET, FILM COATED ORAL at 15:06

## 2025-02-14 RX ADMIN — Medication 10 ML: at 09:03

## 2025-02-14 RX ADMIN — Medication 10 ML: at 20:16

## 2025-02-14 RX ADMIN — LEVETIRACETAM 500 MG: 500 TABLET, FILM COATED ORAL at 08:38

## 2025-02-14 RX ADMIN — WATER 1000 MG: 1 INJECTION INTRAMUSCULAR; INTRAVENOUS; SUBCUTANEOUS at 08:38

## 2025-02-14 RX ADMIN — OSELTAMIVIR PHOSPHATE 30 MG: 30 CAPSULE ORAL at 08:37

## 2025-02-14 RX ADMIN — LEVETIRACETAM 500 MG: 500 TABLET, FILM COATED ORAL at 20:14

## 2025-02-14 RX ADMIN — Medication 3 MG: at 20:14

## 2025-02-14 RX ADMIN — DOXYCYCLINE 100 MG: 100 INJECTION, POWDER, LYOPHILIZED, FOR SOLUTION INTRAVENOUS at 20:40

## 2025-02-14 RX ADMIN — GUAIFENESIN 600 MG: 600 TABLET ORAL at 08:38

## 2025-02-14 RX ADMIN — APIXABAN 10 MG: 5 TABLET, FILM COATED ORAL at 20:14

## 2025-02-14 RX ADMIN — APIXABAN 10 MG: 5 TABLET, FILM COATED ORAL at 08:37

## 2025-02-14 ASSESSMENT — PAIN SCALES - GENERAL: PAINLEVEL_OUTOF10: 0

## 2025-02-14 NOTE — CARE COORDINATION
PT IN AFIB AND STARTED ON CARDIZEM DRIP. PT ON 5L NC 02. ELIQUIS STARTED AND CARD PLACED ON CHART. DTR DECLINED HHC/SNF WITH LSW, PT MAY BENEFIT FROM PALL CARE CONSULT. WOULD NEED HOME 02 EVAL, WILL FOLLOW.

## 2025-02-14 NOTE — PLAN OF CARE
Problem: Safety - Adult  Goal: Free from fall injury  Outcome: Progressing     Problem: Discharge Planning  Goal: Discharge to home or other facility with appropriate resources  Outcome: Progressing     Problem: Pain  Goal: Verbalizes/displays adequate comfort level or baseline comfort level  Outcome: Progressing     Problem: SLP Adult - Impaired Swallowing  Goal: By Discharge: Advance to least restrictive diet without signs or symptoms of aspiration for planned discharge setting.  See evaluation for individualized goals.  2/13/2025 1401 by Dilcia Gilman, SLP  Outcome: Progressing     Problem: Nutrition Deficit:  Goal: Optimize nutritional status  2/13/2025 1436 by Eri Reilly, MS, RD, LD  Flowsheets (Taken 2/13/2025 1436)  Nutrient intake appropriate for improving, restoring, or maintaining nutritional needs:   Assess nutritional status and recommend course of action   Monitor oral intake, labs, and treatment plans   Recommend appropriate diets, oral nutritional supplements, and vitamin/mineral supplements   Provide specific nutrition education to patient or family as appropriate

## 2025-02-14 NOTE — PROCEDURES
environment.      GENERAL  Previous swallow testing:  BSE 2/13 this admission rec minced and mildly thick, MBS    Cognitive status:   Alert  Cognitive Deficits    Communication observation:   Functional    Follows Directions:   Yes    Diet Level Prior to this Evaluation:    Minced and moist, mildly thick liquids    Current respiratory status:      3 L nasal cannula    Baseline Vocal Quality:  Weak    Oral Hygiene:  Moist  and Other:  Light white coating on tongue    Dentition:  Edentulous    Oral mechanism examination:  Labial: Impaired coordination  Lingual: Decreased strength and Decreased coordination      PROCEDURE   Patient Positioning: Seated 70-90°  Viewing Plane(s): Lateral  Contrast: commercially prepared, non-standardized barium viscosities; ranging from thin liquid to pudding consistency  Consistencies Administered:  Puree , Soft solid , Thin , and Mildly thick   How Presented:  SLP fed      ORAL PHASE:  Bolus Acceptance:   No impairment    Bolus Formation/Control:   Incomplete lip closure: All trialed consistencies  Reduced mastication: Soft solid  Decreased bolus cohesion: All trialed consistencies  Premature bolus loss to pharynx: All trialed consistencies    Bolus Propulsion:  Lingual pumping: All trialed consistencies  Discoordination: All trialed consistencies    Oral Residue:   Superior tongue: All trialed consistencies and Hard palate: All trialed consistencies    Response to Oral Residue:  Mostly cleared with re-swallow Independently with delay and Unable to completely clear Independently        ORAL STAGE IMPRESSION:  Moderate oral dysphagia characterized by decreased labial seal, bolus formation and cohesion, incomplete mastication, and premature pharyngeal spillage to the valleculae/pyriforms.  Mild lingual and palatal residue cleared with increased time independently.  Pt was unable to completely chew soft trial despite effort and SLP prompted pt to remove from mouth.        PHARYNGEAL STAGE:

## 2025-02-14 NOTE — ACP (ADVANCE CARE PLANNING)
Advance care planning  Goals of care discussion.  30 mins discussion    I called her dtr Pablo.    I gave her update on her condition, status and tx plan.     We discussed her code status and tx plan.  I explained the process of CPR, including chest compression, shocks, intubation life support.    Johanna stated that mom is getting older and gradually declining.  She does not want her to receive any heroic or aggressive measures such as resuscitative effort.  She wants care that is intended for comfort.  CODE STATUS changed to DNR CCA to reflect her wishes.    I informed her that CAT scan showed basilar infiltration and that I could not exclude the possibility of underlying mass.  I recommend patient to have repeat CAT scan of the chest in a few months.  Not sure if patient is found to have cancer that the patient will be able to tolerate any oncological care such as chemo or immunotherapy given her frailty.    Johanna stated that mom lives with her  .  She goes to work as a  about her sister who is a nurse is able to help occasionally.    Mom may need to go back to skilled care for short period of time  We discussed with case management.        QUAN Aviles MD

## 2025-02-15 LAB
ALBUMIN SERPL-MCNC: 2.5 G/DL (ref 3.5–4.6)
ALP SERPL-CCNC: 65 U/L (ref 40–130)
ALT SERPL-CCNC: 9 U/L (ref 0–33)
ANION GAP SERPL CALCULATED.3IONS-SCNC: 9 MEQ/L (ref 9–15)
AST SERPL-CCNC: 14 U/L (ref 0–35)
BASOPHILS # BLD: 0 K/UL (ref 0–0.2)
BASOPHILS NFR BLD: 0.2 %
BILIRUB SERPL-MCNC: 0.3 MG/DL (ref 0.2–0.7)
BUN SERPL-MCNC: 15 MG/DL (ref 8–23)
CALCIUM SERPL-MCNC: 8.1 MG/DL (ref 8.5–9.9)
CHLORIDE SERPL-SCNC: 110 MEQ/L (ref 95–107)
CO2 SERPL-SCNC: 23 MEQ/L (ref 20–31)
CREAT SERPL-MCNC: 0.67 MG/DL (ref 0.5–0.9)
EOSINOPHIL # BLD: 0.5 K/UL (ref 0–0.7)
EOSINOPHIL NFR BLD: 8.6 %
ERYTHROCYTE [DISTWIDTH] IN BLOOD BY AUTOMATED COUNT: 14.2 % (ref 11.5–14.5)
GLOBULIN SER CALC-MCNC: 3 G/DL (ref 2.3–3.5)
GLUCOSE SERPL-MCNC: 78 MG/DL (ref 70–99)
HCT VFR BLD AUTO: 38.7 % (ref 37–47)
HGB BLD-MCNC: 12.3 G/DL (ref 12–16)
LYMPHOCYTES # BLD: 1.2 K/UL (ref 1–4.8)
LYMPHOCYTES NFR BLD: 20.4 %
MAGNESIUM SERPL-MCNC: 2 MG/DL (ref 1.7–2.4)
MCH RBC QN AUTO: 28.1 PG (ref 27–31.3)
MCHC RBC AUTO-ENTMCNC: 31.8 % (ref 33–37)
MCV RBC AUTO: 88.6 FL (ref 79.4–94.8)
MONOCYTES # BLD: 0.4 K/UL (ref 0.2–0.8)
MONOCYTES NFR BLD: 6.2 %
NEUTROPHILS # BLD: 3.8 K/UL (ref 1.4–6.5)
NEUTS SEG NFR BLD: 64.1 %
PLATELET # BLD AUTO: 243 K/UL (ref 130–400)
POTASSIUM SERPL-SCNC: 3.2 MEQ/L (ref 3.4–4.9)
PROT SERPL-MCNC: 5.5 G/DL (ref 6.3–8)
RBC # BLD AUTO: 4.37 M/UL (ref 4.2–5.4)
SODIUM SERPL-SCNC: 142 MEQ/L (ref 135–144)
WBC # BLD AUTO: 5.9 K/UL (ref 4.8–10.8)

## 2025-02-15 PROCEDURE — 2060000000 HC ICU INTERMEDIATE R&B

## 2025-02-15 PROCEDURE — 94640 AIRWAY INHALATION TREATMENT: CPT

## 2025-02-15 PROCEDURE — 2580000003 HC RX 258: Performed by: INTERNAL MEDICINE

## 2025-02-15 PROCEDURE — 36415 COLL VENOUS BLD VENIPUNCTURE: CPT

## 2025-02-15 PROCEDURE — 2700000000 HC OXYGEN THERAPY PER DAY

## 2025-02-15 PROCEDURE — 6370000000 HC RX 637 (ALT 250 FOR IP): Performed by: INTERNAL MEDICINE

## 2025-02-15 PROCEDURE — 83735 ASSAY OF MAGNESIUM: CPT

## 2025-02-15 PROCEDURE — 97535 SELF CARE MNGMENT TRAINING: CPT

## 2025-02-15 PROCEDURE — 85025 COMPLETE CBC W/AUTO DIFF WBC: CPT

## 2025-02-15 PROCEDURE — 2500000003 HC RX 250 WO HCPCS

## 2025-02-15 PROCEDURE — 6360000002 HC RX W HCPCS: Performed by: INTERNAL MEDICINE

## 2025-02-15 PROCEDURE — 94761 N-INVAS EAR/PLS OXIMETRY MLT: CPT

## 2025-02-15 PROCEDURE — 2500000003 HC RX 250 WO HCPCS: Performed by: INTERNAL MEDICINE

## 2025-02-15 PROCEDURE — 80053 COMPREHEN METABOLIC PANEL: CPT

## 2025-02-15 PROCEDURE — 6370000000 HC RX 637 (ALT 250 FOR IP)

## 2025-02-15 RX ORDER — METOPROLOL TARTRATE 1 MG/ML
5 INJECTION, SOLUTION INTRAVENOUS
Status: COMPLETED | OUTPATIENT
Start: 2025-02-15 | End: 2025-02-15

## 2025-02-15 RX ORDER — METOPROLOL TARTRATE 50 MG
50 TABLET ORAL 2 TIMES DAILY
Status: DISCONTINUED | OUTPATIENT
Start: 2025-02-15 | End: 2025-02-21 | Stop reason: HOSPADM

## 2025-02-15 RX ORDER — DIGOXIN 0.25 MG/ML
250 INJECTION INTRAMUSCULAR; INTRAVENOUS ONCE
Status: COMPLETED | OUTPATIENT
Start: 2025-02-15 | End: 2025-02-15

## 2025-02-15 RX ORDER — METOPROLOL TARTRATE 1 MG/ML
5 INJECTION, SOLUTION INTRAVENOUS EVERY 4 HOURS PRN
Status: DISCONTINUED | OUTPATIENT
Start: 2025-02-15 | End: 2025-02-21 | Stop reason: HOSPADM

## 2025-02-15 RX ORDER — POTASSIUM CHLORIDE 1500 MG/1
40 TABLET, EXTENDED RELEASE ORAL ONCE
Status: COMPLETED | OUTPATIENT
Start: 2025-02-15 | End: 2025-02-15

## 2025-02-15 RX ORDER — 0.9 % SODIUM CHLORIDE 0.9 %
500 INTRAVENOUS SOLUTION INTRAVENOUS ONCE
Status: COMPLETED | OUTPATIENT
Start: 2025-02-15 | End: 2025-02-15

## 2025-02-15 RX ADMIN — APIXABAN 10 MG: 5 TABLET, FILM COATED ORAL at 09:18

## 2025-02-15 RX ADMIN — GUAIFENESIN 600 MG: 600 TABLET ORAL at 09:17

## 2025-02-15 RX ADMIN — OSELTAMIVIR PHOSPHATE 30 MG: 30 CAPSULE ORAL at 09:18

## 2025-02-15 RX ADMIN — APIXABAN 10 MG: 5 TABLET, FILM COATED ORAL at 22:57

## 2025-02-15 RX ADMIN — DOXYCYCLINE 100 MG: 100 INJECTION, POWDER, LYOPHILIZED, FOR SOLUTION INTRAVENOUS at 11:18

## 2025-02-15 RX ADMIN — DIGOXIN 250 MCG: 0.25 INJECTION INTRAMUSCULAR; INTRAVENOUS at 08:31

## 2025-02-15 RX ADMIN — OSELTAMIVIR PHOSPHATE 30 MG: 30 CAPSULE ORAL at 22:58

## 2025-02-15 RX ADMIN — METOPROLOL TARTRATE 50 MG: 50 TABLET, FILM COATED ORAL at 09:18

## 2025-02-15 RX ADMIN — LEVETIRACETAM 500 MG: 500 TABLET, FILM COATED ORAL at 22:57

## 2025-02-15 RX ADMIN — Medication 3 MG: at 22:58

## 2025-02-15 RX ADMIN — GUAIFENESIN 600 MG: 600 TABLET ORAL at 22:58

## 2025-02-15 RX ADMIN — Medication 10 ML: at 22:57

## 2025-02-15 RX ADMIN — POTASSIUM CHLORIDE 40 MEQ: 1500 TABLET, EXTENDED RELEASE ORAL at 15:23

## 2025-02-15 RX ADMIN — IPRATROPIUM BROMIDE AND ALBUTEROL SULFATE 1 DOSE: .5; 2.5 SOLUTION RESPIRATORY (INHALATION) at 07:16

## 2025-02-15 RX ADMIN — METOPROLOL TARTRATE 5 MG: 5 INJECTION, SOLUTION INTRAVENOUS at 09:56

## 2025-02-15 RX ADMIN — IPRATROPIUM BROMIDE AND ALBUTEROL SULFATE 1 DOSE: .5; 2.5 SOLUTION RESPIRATORY (INHALATION) at 19:22

## 2025-02-15 RX ADMIN — METOPROLOL TARTRATE 5 MG: 5 INJECTION, SOLUTION INTRAVENOUS at 09:18

## 2025-02-15 RX ADMIN — WATER 1000 MG: 1 INJECTION INTRAMUSCULAR; INTRAVENOUS; SUBCUTANEOUS at 09:18

## 2025-02-15 RX ADMIN — Medication 10 ML: at 09:18

## 2025-02-15 RX ADMIN — DOXYCYCLINE 100 MG: 100 INJECTION, POWDER, LYOPHILIZED, FOR SOLUTION INTRAVENOUS at 22:55

## 2025-02-15 RX ADMIN — LEVETIRACETAM 500 MG: 500 TABLET, FILM COATED ORAL at 09:17

## 2025-02-15 RX ADMIN — SODIUM CHLORIDE 500 ML: 9 INJECTION, SOLUTION INTRAVENOUS at 08:21

## 2025-02-15 RX ADMIN — METOPROLOL TARTRATE 50 MG: 50 TABLET, FILM COATED ORAL at 22:58

## 2025-02-15 RX ADMIN — SERTRALINE 25 MG: 25 TABLET, FILM COATED ORAL at 09:18

## 2025-02-16 LAB
ALBUMIN SERPL-MCNC: 2.5 G/DL (ref 3.5–4.6)
ALP SERPL-CCNC: 61 U/L (ref 40–130)
ALT SERPL-CCNC: 8 U/L (ref 0–33)
ANION GAP SERPL CALCULATED.3IONS-SCNC: 8 MEQ/L (ref 9–15)
AST SERPL-CCNC: 13 U/L (ref 0–35)
BASOPHILS # BLD: 0 K/UL (ref 0–0.2)
BASOPHILS NFR BLD: 0.3 %
BILIRUB SERPL-MCNC: <0.2 MG/DL (ref 0.2–0.7)
BUN SERPL-MCNC: 12 MG/DL (ref 8–23)
CALCIUM SERPL-MCNC: 7.9 MG/DL (ref 8.5–9.9)
CHLORIDE SERPL-SCNC: 112 MEQ/L (ref 95–107)
CO2 SERPL-SCNC: 23 MEQ/L (ref 20–31)
CREAT SERPL-MCNC: 0.68 MG/DL (ref 0.5–0.9)
EOSINOPHIL # BLD: 0.6 K/UL (ref 0–0.7)
EOSINOPHIL NFR BLD: 9.4 %
ERYTHROCYTE [DISTWIDTH] IN BLOOD BY AUTOMATED COUNT: 14.4 % (ref 11.5–14.5)
GLOBULIN SER CALC-MCNC: 2.8 G/DL (ref 2.3–3.5)
GLUCOSE SERPL-MCNC: 87 MG/DL (ref 70–99)
HCT VFR BLD AUTO: 37.1 % (ref 37–47)
HGB BLD-MCNC: 11.4 G/DL (ref 12–16)
LYMPHOCYTES # BLD: 1.3 K/UL (ref 1–4.8)
LYMPHOCYTES NFR BLD: 20.1 %
MCH RBC QN AUTO: 27.2 PG (ref 27–31.3)
MCHC RBC AUTO-ENTMCNC: 30.7 % (ref 33–37)
MCV RBC AUTO: 88.5 FL (ref 79.4–94.8)
MONOCYTES # BLD: 0.4 K/UL (ref 0.2–0.8)
MONOCYTES NFR BLD: 5.9 %
NEUTROPHILS # BLD: 4 K/UL (ref 1.4–6.5)
NEUTS SEG NFR BLD: 63.8 %
PLATELET # BLD AUTO: 297 K/UL (ref 130–400)
POTASSIUM SERPL-SCNC: 4.1 MEQ/L (ref 3.4–4.9)
PROT SERPL-MCNC: 5.3 G/DL (ref 6.3–8)
RBC # BLD AUTO: 4.19 M/UL (ref 4.2–5.4)
SODIUM SERPL-SCNC: 143 MEQ/L (ref 135–144)
WBC # BLD AUTO: 6.3 K/UL (ref 4.8–10.8)

## 2025-02-16 PROCEDURE — 6370000000 HC RX 637 (ALT 250 FOR IP)

## 2025-02-16 PROCEDURE — 2580000003 HC RX 258: Performed by: INTERNAL MEDICINE

## 2025-02-16 PROCEDURE — 2500000003 HC RX 250 WO HCPCS: Performed by: INTERNAL MEDICINE

## 2025-02-16 PROCEDURE — 6360000002 HC RX W HCPCS: Performed by: INTERNAL MEDICINE

## 2025-02-16 PROCEDURE — 85025 COMPLETE CBC W/AUTO DIFF WBC: CPT

## 2025-02-16 PROCEDURE — 2500000003 HC RX 250 WO HCPCS

## 2025-02-16 PROCEDURE — 36415 COLL VENOUS BLD VENIPUNCTURE: CPT

## 2025-02-16 PROCEDURE — 2060000000 HC ICU INTERMEDIATE R&B

## 2025-02-16 PROCEDURE — 94761 N-INVAS EAR/PLS OXIMETRY MLT: CPT

## 2025-02-16 PROCEDURE — 6370000000 HC RX 637 (ALT 250 FOR IP): Performed by: INTERNAL MEDICINE

## 2025-02-16 PROCEDURE — 80053 COMPREHEN METABOLIC PANEL: CPT

## 2025-02-16 PROCEDURE — 94640 AIRWAY INHALATION TREATMENT: CPT

## 2025-02-16 RX ORDER — DOXYCYCLINE 100 MG/1
100 CAPSULE ORAL EVERY 12 HOURS SCHEDULED
Status: COMPLETED | OUTPATIENT
Start: 2025-02-16 | End: 2025-02-19

## 2025-02-16 RX ORDER — IPRATROPIUM BROMIDE AND ALBUTEROL SULFATE 2.5; .5 MG/3ML; MG/3ML
1 SOLUTION RESPIRATORY (INHALATION) 2 TIMES DAILY PRN
Status: DISCONTINUED | OUTPATIENT
Start: 2025-02-16 | End: 2025-02-21 | Stop reason: HOSPADM

## 2025-02-16 RX ADMIN — LEVETIRACETAM 500 MG: 500 TABLET, FILM COATED ORAL at 10:22

## 2025-02-16 RX ADMIN — OSELTAMIVIR PHOSPHATE 30 MG: 30 CAPSULE ORAL at 21:40

## 2025-02-16 RX ADMIN — METOPROLOL TARTRATE 50 MG: 50 TABLET, FILM COATED ORAL at 10:21

## 2025-02-16 RX ADMIN — IPRATROPIUM BROMIDE AND ALBUTEROL SULFATE 1 DOSE: .5; 2.5 SOLUTION RESPIRATORY (INHALATION) at 07:07

## 2025-02-16 RX ADMIN — DOXYCYCLINE 100 MG: 100 INJECTION, POWDER, LYOPHILIZED, FOR SOLUTION INTRAVENOUS at 11:19

## 2025-02-16 RX ADMIN — WATER 1000 MG: 1 INJECTION INTRAMUSCULAR; INTRAVENOUS; SUBCUTANEOUS at 10:33

## 2025-02-16 RX ADMIN — LEVETIRACETAM 500 MG: 500 TABLET, FILM COATED ORAL at 21:41

## 2025-02-16 RX ADMIN — Medication 10 ML: at 10:32

## 2025-02-16 RX ADMIN — GUAIFENESIN 600 MG: 600 TABLET ORAL at 21:41

## 2025-02-16 RX ADMIN — METOPROLOL TARTRATE 50 MG: 50 TABLET, FILM COATED ORAL at 21:40

## 2025-02-16 RX ADMIN — APIXABAN 10 MG: 5 TABLET, FILM COATED ORAL at 10:21

## 2025-02-16 RX ADMIN — APIXABAN 10 MG: 5 TABLET, FILM COATED ORAL at 21:40

## 2025-02-16 RX ADMIN — Medication 10 ML: at 22:23

## 2025-02-16 RX ADMIN — SERTRALINE 25 MG: 25 TABLET, FILM COATED ORAL at 10:22

## 2025-02-16 RX ADMIN — GUAIFENESIN 600 MG: 600 TABLET ORAL at 10:21

## 2025-02-16 RX ADMIN — OSELTAMIVIR PHOSPHATE 30 MG: 30 CAPSULE ORAL at 15:23

## 2025-02-16 RX ADMIN — Medication 3 MG: at 21:41

## 2025-02-16 RX ADMIN — DOXYCYCLINE HYCLATE 100 MG: 100 CAPSULE ORAL at 22:19

## 2025-02-16 ASSESSMENT — PAIN SCALES - GENERAL: PAINLEVEL_OUTOF10: 0

## 2025-02-16 NOTE — PLAN OF CARE
Problem: Safety - Adult  Goal: Free from fall injury  2/16/2025 0100 by Madhuri Chinchilla RN  Outcome: Progressing  2/15/2025 1743 by Kaylan Arana RN  Outcome: Progressing     Problem: Chronic Conditions and Co-morbidities  Goal: Patient's chronic conditions and co-morbidity symptoms are monitored and maintained or improved  2/16/2025 0100 by Madhuri Chinchilla RN  Outcome: Progressing  2/15/2025 1743 by Kaylan Arana RN  Outcome: Progressing     Problem: Discharge Planning  Goal: Discharge to home or other facility with appropriate resources  2/16/2025 0100 by Madhuri Chinchilla RN  Outcome: Progressing  2/15/2025 1743 by Kaylan Arana RN  Outcome: Progressing     Problem: ABCDS Injury Assessment  Goal: Absence of physical injury  2/16/2025 0100 by Madhuri Chinchilla RN  Outcome: Progressing  2/15/2025 1743 by Kaylan Arana RN  Outcome: Progressing     Problem: Skin/Tissue Integrity  Goal: Skin integrity remains intact  Description: 1.  Monitor for areas of redness and/or skin breakdown  2.  Assess vascular access sites hourly  3.  Every 4-6 hours minimum:  Change oxygen saturation probe site  4.  Every 4-6 hours:  If on nasal continuous positive airway pressure, respiratory therapy assess nares and determine need for appliance change or resting period  2/16/2025 0100 by Madhuri Chinchilla RN  Outcome: Progressing  2/15/2025 1743 by Kaylan Arana RN  Outcome: Progressing     Problem: Pain  Goal: Verbalizes/displays adequate comfort level or baseline comfort level  2/16/2025 0100 by Madhuri Chinchilla RN  Outcome: Progressing  2/15/2025 1743 by Kaylan Arana RN  Outcome: Progressing     Problem: Nutrition Deficit:  Goal: Optimize nutritional status  2/16/2025 0100 by Madhuri Chinchilla RN  Outcome: Progressing  2/15/2025 1743 by Kaylan Arana RN  Outcome: Progressing

## 2025-02-16 NOTE — FLOWSHEET NOTE
RN notified patient -150, BP 73/34. MD at bedside during morning rounds, notified of abnormal labs. Orders placed. Patient medicated per orders.     .Electronically signed by Kaylan Arana RN on 2/15/2025 at 7:32 PM

## 2025-02-17 ENCOUNTER — APPOINTMENT (OUTPATIENT)
Age: 85
DRG: 193 | End: 2025-02-17
Attending: INTERNAL MEDICINE
Payer: MEDICARE

## 2025-02-17 LAB
ALBUMIN SERPL-MCNC: 2.6 G/DL (ref 3.5–4.6)
ALP SERPL-CCNC: 63 U/L (ref 40–130)
ALT SERPL-CCNC: 7 U/L (ref 0–33)
ANION GAP SERPL CALCULATED.3IONS-SCNC: 9 MEQ/L (ref 9–15)
AST SERPL-CCNC: 13 U/L (ref 0–35)
BASOPHILS # BLD: 0 K/UL (ref 0–0.2)
BASOPHILS NFR BLD: 0.5 %
BILIRUB SERPL-MCNC: <0.2 MG/DL (ref 0.2–0.7)
BUN SERPL-MCNC: 11 MG/DL (ref 8–23)
CALCIUM SERPL-MCNC: 8 MG/DL (ref 8.5–9.9)
CHLORIDE SERPL-SCNC: 109 MEQ/L (ref 95–107)
CO2 SERPL-SCNC: 24 MEQ/L (ref 20–31)
CREAT SERPL-MCNC: 0.65 MG/DL (ref 0.5–0.9)
ECHO AO ROOT DIAM: 3 CM
ECHO AO ROOT INDEX: 1.88 CM/M2
ECHO AR MAX VEL PISA: 4 M/S
ECHO AV AREA PEAK VELOCITY: 1.1 CM2
ECHO AV AREA VTI: 1.3 CM2
ECHO AV AREA/BSA PEAK VELOCITY: 0.7 CM2/M2
ECHO AV AREA/BSA VTI: 0.8 CM2/M2
ECHO AV CUSP MM: 1.3 CM
ECHO AV MEAN GRADIENT: 5 MMHG
ECHO AV MEAN VELOCITY: 1.1 M/S
ECHO AV PEAK GRADIENT: 9 MMHG
ECHO AV PEAK VELOCITY: 1.7 M/S
ECHO AV REGURGITANT PHT: 452.8 MS
ECHO AV VELOCITY RATIO: 0.41
ECHO AV VTI: 38 CM
ECHO BSA: 1.55 M2
ECHO LA DIAMETER INDEX: 2.19 CM/M2
ECHO LA DIAMETER: 3.5 CM
ECHO LA TO AORTIC ROOT RATIO: 1.17
ECHO LA VOL A-L A2C: 73 ML (ref 22–52)
ECHO LA VOL A-L A4C: 64 ML (ref 22–52)
ECHO LA VOL MOD A2C: 69 ML (ref 22–52)
ECHO LA VOL MOD A4C: 59 ML (ref 22–52)
ECHO LA VOLUME AREA LENGTH: 69 ML
ECHO LA VOLUME INDEX A-L A2C: 46 ML/M2 (ref 16–34)
ECHO LA VOLUME INDEX A-L A4C: 40 ML/M2 (ref 16–34)
ECHO LA VOLUME INDEX AREA LENGTH: 43 ML/M2 (ref 16–34)
ECHO LA VOLUME INDEX MOD A2C: 43 ML/M2 (ref 16–34)
ECHO LA VOLUME INDEX MOD A4C: 37 ML/M2 (ref 16–34)
ECHO LV EDV A2C: 51 ML
ECHO LV EDV A4C: 67 ML
ECHO LV EDV BP: 60 ML (ref 56–104)
ECHO LV EDV INDEX A4C: 42 ML/M2
ECHO LV EDV INDEX BP: 38 ML/M2
ECHO LV EDV NDEX A2C: 32 ML/M2
ECHO LV EJECTION FRACTION A2C: 51 %
ECHO LV EJECTION FRACTION A4C: 55 %
ECHO LV EJECTION FRACTION BIPLANE: 55 % (ref 55–100)
ECHO LV ESV A2C: 25 ML
ECHO LV ESV A4C: 30 ML
ECHO LV ESV BP: 27 ML (ref 19–49)
ECHO LV ESV INDEX A2C: 16 ML/M2
ECHO LV ESV INDEX A4C: 19 ML/M2
ECHO LV ESV INDEX BP: 17 ML/M2
ECHO LV FRACTIONAL SHORTENING: 26 % (ref 28–44)
ECHO LV INTERNAL DIMENSION DIASTOLE INDEX: 2.38 CM/M2
ECHO LV INTERNAL DIMENSION DIASTOLIC: 3.8 CM (ref 3.9–5.3)
ECHO LV INTERNAL DIMENSION SYSTOLIC INDEX: 1.75 CM/M2
ECHO LV INTERNAL DIMENSION SYSTOLIC: 2.8 CM
ECHO LV IVSD: 0.9 CM (ref 0.6–0.9)
ECHO LV IVSS: 1 CM
ECHO LV MASS 2D: 101.1 G (ref 67–162)
ECHO LV MASS INDEX 2D: 63.2 G/M2 (ref 43–95)
ECHO LV POSTERIOR WALL DIASTOLIC: 0.9 CM (ref 0.6–0.9)
ECHO LV POSTERIOR WALL SYSTOLIC: 1.3 CM
ECHO LV RELATIVE WALL THICKNESS RATIO: 0.47
ECHO LVOT AREA: 2.5 CM2
ECHO LVOT AV VTI INDEX: 0.5
ECHO LVOT DIAM: 1.8 CM
ECHO LVOT MEAN GRADIENT: 1 MMHG
ECHO LVOT PEAK GRADIENT: 2 MMHG
ECHO LVOT PEAK VELOCITY: 0.7 M/S
ECHO LVOT STROKE VOLUME INDEX: 30 ML/M2
ECHO LVOT SV: 48.1 ML
ECHO LVOT VTI: 18.9 CM
ECHO MV E VELOCITY: 1.01 M/S
ECHO MV REGURGITANT PEAK GRADIENT: 121 MMHG
ECHO MV REGURGITANT PEAK VELOCITY: 5.5 M/S
ECHO PV MAX VELOCITY: 1.3 M/S
ECHO PV PEAK GRADIENT: 12 MMHG
ECHO RV INTERNAL DIMENSION: 2.8 CM
ECHO RV TAPSE: 1.9 CM (ref 1.7–?)
ECHO TV REGURGITANT MAX VELOCITY: 3.13 M/S
ECHO TV REGURGITANT PEAK GRADIENT: 39 MMHG
EOSINOPHIL # BLD: 0.9 K/UL (ref 0–0.7)
EOSINOPHIL NFR BLD: 11.1 %
ERYTHROCYTE [DISTWIDTH] IN BLOOD BY AUTOMATED COUNT: 14 % (ref 11.5–14.5)
GLOBULIN SER CALC-MCNC: 2.8 G/DL (ref 2.3–3.5)
GLUCOSE SERPL-MCNC: 83 MG/DL (ref 70–99)
HCT VFR BLD AUTO: 35.9 % (ref 37–47)
HGB BLD-MCNC: 11.2 G/DL (ref 12–16)
LYMPHOCYTES # BLD: 1.4 K/UL (ref 1–4.8)
LYMPHOCYTES NFR BLD: 16.9 %
MCH RBC QN AUTO: 27.1 PG (ref 27–31.3)
MCHC RBC AUTO-ENTMCNC: 31.2 % (ref 33–37)
MCV RBC AUTO: 86.9 FL (ref 79.4–94.8)
MONOCYTES # BLD: 0.6 K/UL (ref 0.2–0.8)
MONOCYTES NFR BLD: 6.7 %
NEUTROPHILS # BLD: 5.3 K/UL (ref 1.4–6.5)
NEUTS SEG NFR BLD: 63.8 %
PLATELET # BLD AUTO: 306 K/UL (ref 130–400)
POTASSIUM SERPL-SCNC: 3.6 MEQ/L (ref 3.4–4.9)
PROT SERPL-MCNC: 5.4 G/DL (ref 6.3–8)
RBC # BLD AUTO: 4.13 M/UL (ref 4.2–5.4)
SODIUM SERPL-SCNC: 142 MEQ/L (ref 135–144)
WBC # BLD AUTO: 8.4 K/UL (ref 4.8–10.8)

## 2025-02-17 PROCEDURE — 2500000003 HC RX 250 WO HCPCS

## 2025-02-17 PROCEDURE — 2060000000 HC ICU INTERMEDIATE R&B

## 2025-02-17 PROCEDURE — APPSS15 APP SPLIT SHARED TIME 0-15 MINUTES: Performed by: NURSE PRACTITIONER

## 2025-02-17 PROCEDURE — 99231 SBSQ HOSP IP/OBS SF/LOW 25: CPT | Performed by: PSYCHIATRY & NEUROLOGY

## 2025-02-17 PROCEDURE — 85025 COMPLETE CBC W/AUTO DIFF WBC: CPT

## 2025-02-17 PROCEDURE — 6370000000 HC RX 637 (ALT 250 FOR IP): Performed by: INTERNAL MEDICINE

## 2025-02-17 PROCEDURE — 6370000000 HC RX 637 (ALT 250 FOR IP)

## 2025-02-17 PROCEDURE — 93306 TTE W/DOPPLER COMPLETE: CPT

## 2025-02-17 PROCEDURE — 80053 COMPREHEN METABOLIC PANEL: CPT

## 2025-02-17 PROCEDURE — 36415 COLL VENOUS BLD VENIPUNCTURE: CPT

## 2025-02-17 PROCEDURE — 93306 TTE W/DOPPLER COMPLETE: CPT | Performed by: INTERNAL MEDICINE

## 2025-02-17 PROCEDURE — 2500000003 HC RX 250 WO HCPCS: Performed by: INTERNAL MEDICINE

## 2025-02-17 PROCEDURE — 6360000002 HC RX W HCPCS: Performed by: INTERNAL MEDICINE

## 2025-02-17 RX ADMIN — DOXYCYCLINE HYCLATE 100 MG: 100 CAPSULE ORAL at 10:07

## 2025-02-17 RX ADMIN — LEVETIRACETAM 500 MG: 500 TABLET, FILM COATED ORAL at 21:14

## 2025-02-17 RX ADMIN — WATER 1000 MG: 1 INJECTION INTRAMUSCULAR; INTRAVENOUS; SUBCUTANEOUS at 10:08

## 2025-02-17 RX ADMIN — Medication 3 MG: at 21:14

## 2025-02-17 RX ADMIN — GUAIFENESIN 600 MG: 600 TABLET ORAL at 10:07

## 2025-02-17 RX ADMIN — SERTRALINE 25 MG: 25 TABLET, FILM COATED ORAL at 10:07

## 2025-02-17 RX ADMIN — GUAIFENESIN 600 MG: 600 TABLET ORAL at 21:14

## 2025-02-17 RX ADMIN — APIXABAN 10 MG: 5 TABLET, FILM COATED ORAL at 10:06

## 2025-02-17 RX ADMIN — DOXYCYCLINE HYCLATE 100 MG: 100 CAPSULE ORAL at 21:14

## 2025-02-17 RX ADMIN — LEVETIRACETAM 500 MG: 500 TABLET, FILM COATED ORAL at 10:07

## 2025-02-17 RX ADMIN — METOPROLOL TARTRATE 50 MG: 50 TABLET, FILM COATED ORAL at 10:08

## 2025-02-17 RX ADMIN — METOPROLOL TARTRATE 50 MG: 50 TABLET, FILM COATED ORAL at 21:14

## 2025-02-17 RX ADMIN — OSELTAMIVIR PHOSPHATE 30 MG: 30 CAPSULE ORAL at 15:16

## 2025-02-17 RX ADMIN — Medication 10 ML: at 21:14

## 2025-02-17 RX ADMIN — Medication 10 ML: at 10:12

## 2025-02-17 RX ADMIN — APIXABAN 10 MG: 5 TABLET, FILM COATED ORAL at 21:14

## 2025-02-17 ASSESSMENT — ENCOUNTER SYMPTOMS
WHEEZING: 0
TROUBLE SWALLOWING: 0
VOMITING: 0
NAUSEA: 0
SHORTNESS OF BREATH: 0
CHEST TIGHTNESS: 0
COLOR CHANGE: 0
COUGH: 1

## 2025-02-17 ASSESSMENT — PAIN SCALES - GENERAL
PAINLEVEL_OUTOF10: 0

## 2025-02-17 NOTE — CARE COORDINATION
PT ON RA, [facility] 17.  DC PLAN HOME ONCE CLEARED BY DRS.  MESSAGE LEFT FOR DTR TO CONFIRM DC PLAN.

## 2025-02-17 NOTE — CARE COORDINATION
LSW called and spoke with pt's daughter, Johanna. Discussed DC plan with daughter. Daughter report, she changed her mind and would like pt to go to [facility] for skilled therapy.     Referral sent to [facility]. Pending acceptance.   Will need pre-cert piror to DC.     Electronically signed by ELIZABET Ernandez on 2/17/2025 at 3:13 PM

## 2025-02-17 NOTE — PLAN OF CARE
Nutrition Problem #1: Moderate malnutrition, in context of social or environmental circumstances  Intervention: Food and/or Nutrient Delivery: Continue Current Diet, Continue Oral Nutrition Supplement (Continue Diet dysphagia pureed with mildly thick liquids  Continue to provide a thickened oral supplement BID)

## 2025-02-17 NOTE — FLOWSHEET NOTE
Pt is in bed resting comfortably. She is A/O/ x 1-2, Droplet precautions. She takes her po medications crushed in AS. Last BM on 02/16/2025, no c/o constipation, abd pain, or diarrhea. Bilat Lungs are dim/w/ expiratory wheezing, POX on RA at 98%, no c/o SOB or difficulties breathing. Bilat UE/LE pulses are palpable at +2, LE has trace to +1 edema. No c/o pain at this time. Call light is w/in reach.

## 2025-02-18 LAB
ALBUMIN SERPL-MCNC: 2.7 G/DL (ref 3.5–4.6)
ALP SERPL-CCNC: 67 U/L (ref 40–130)
ALT SERPL-CCNC: 7 U/L (ref 0–33)
ANION GAP SERPL CALCULATED.3IONS-SCNC: 8 MEQ/L (ref 9–15)
AST SERPL-CCNC: 15 U/L (ref 0–35)
BASOPHILS # BLD: 0.1 K/UL (ref 0–0.2)
BASOPHILS NFR BLD: 0.5 %
BILIRUB SERPL-MCNC: <0.2 MG/DL (ref 0.2–0.7)
BUN SERPL-MCNC: 13 MG/DL (ref 8–23)
CALCIUM SERPL-MCNC: 8.2 MG/DL (ref 8.5–9.9)
CHLORIDE SERPL-SCNC: 111 MEQ/L (ref 95–107)
CO2 SERPL-SCNC: 25 MEQ/L (ref 20–31)
CREAT SERPL-MCNC: 0.58 MG/DL (ref 0.5–0.9)
EOSINOPHIL # BLD: 1.1 K/UL (ref 0–0.7)
EOSINOPHIL NFR BLD: 9.7 %
ERYTHROCYTE [DISTWIDTH] IN BLOOD BY AUTOMATED COUNT: 14 % (ref 11.5–14.5)
GLOBULIN SER CALC-MCNC: 2.6 G/DL (ref 2.3–3.5)
GLUCOSE SERPL-MCNC: 65 MG/DL (ref 70–99)
HCT VFR BLD AUTO: 35.4 % (ref 37–47)
HGB BLD-MCNC: 11.6 G/DL (ref 12–16)
LYMPHOCYTES # BLD: 1.4 K/UL (ref 1–4.8)
LYMPHOCYTES NFR BLD: 12.8 %
MCH RBC QN AUTO: 28.2 PG (ref 27–31.3)
MCHC RBC AUTO-ENTMCNC: 32.8 % (ref 33–37)
MCV RBC AUTO: 86.1 FL (ref 79.4–94.8)
MONOCYTES # BLD: 0.8 K/UL (ref 0.2–0.8)
MONOCYTES NFR BLD: 7 %
NEUTROPHILS # BLD: 7.5 K/UL (ref 1.4–6.5)
NEUTS SEG NFR BLD: 68.4 %
PLATELET # BLD AUTO: 370 K/UL (ref 130–400)
POTASSIUM SERPL-SCNC: 4.1 MEQ/L (ref 3.4–4.9)
PROT SERPL-MCNC: 5.3 G/DL (ref 6.3–8)
RBC # BLD AUTO: 4.11 M/UL (ref 4.2–5.4)
SODIUM SERPL-SCNC: 144 MEQ/L (ref 135–144)
WBC # BLD AUTO: 10.9 K/UL (ref 4.8–10.8)

## 2025-02-18 PROCEDURE — 97535 SELF CARE MNGMENT TRAINING: CPT

## 2025-02-18 PROCEDURE — 2060000000 HC ICU INTERMEDIATE R&B

## 2025-02-18 PROCEDURE — 6370000000 HC RX 637 (ALT 250 FOR IP)

## 2025-02-18 PROCEDURE — 6370000000 HC RX 637 (ALT 250 FOR IP): Performed by: INTERNAL MEDICINE

## 2025-02-18 PROCEDURE — 92526 ORAL FUNCTION THERAPY: CPT

## 2025-02-18 PROCEDURE — 85025 COMPLETE CBC W/AUTO DIFF WBC: CPT

## 2025-02-18 PROCEDURE — 97116 GAIT TRAINING THERAPY: CPT

## 2025-02-18 PROCEDURE — 2500000003 HC RX 250 WO HCPCS

## 2025-02-18 PROCEDURE — 80053 COMPREHEN METABOLIC PANEL: CPT

## 2025-02-18 PROCEDURE — 36415 COLL VENOUS BLD VENIPUNCTURE: CPT

## 2025-02-18 RX ADMIN — METOPROLOL TARTRATE 50 MG: 50 TABLET, FILM COATED ORAL at 20:09

## 2025-02-18 RX ADMIN — Medication 3 MG: at 20:09

## 2025-02-18 RX ADMIN — LEVETIRACETAM 500 MG: 500 TABLET, FILM COATED ORAL at 20:09

## 2025-02-18 RX ADMIN — GUAIFENESIN 600 MG: 600 TABLET ORAL at 09:18

## 2025-02-18 RX ADMIN — Medication 10 ML: at 09:18

## 2025-02-18 RX ADMIN — DOXYCYCLINE HYCLATE 100 MG: 100 CAPSULE ORAL at 09:18

## 2025-02-18 RX ADMIN — LEVETIRACETAM 500 MG: 500 TABLET, FILM COATED ORAL at 09:18

## 2025-02-18 RX ADMIN — SERTRALINE 25 MG: 25 TABLET, FILM COATED ORAL at 09:18

## 2025-02-18 RX ADMIN — APIXABAN 10 MG: 5 TABLET, FILM COATED ORAL at 09:18

## 2025-02-18 RX ADMIN — METOPROLOL TARTRATE 50 MG: 50 TABLET, FILM COATED ORAL at 09:18

## 2025-02-18 RX ADMIN — Medication 10 ML: at 20:10

## 2025-02-18 RX ADMIN — APIXABAN 10 MG: 5 TABLET, FILM COATED ORAL at 20:10

## 2025-02-18 RX ADMIN — GUAIFENESIN 600 MG: 600 TABLET ORAL at 20:09

## 2025-02-18 RX ADMIN — DOXYCYCLINE HYCLATE 100 MG: 100 CAPSULE ORAL at 20:09

## 2025-02-18 NOTE — PLAN OF CARE
Problem: Safety - Adult  Goal: Free from fall injury  2/18/2025 1623 by Etelvina Singer, RN  Outcome: Progressing  2/18/2025 0228 by Stephen Owusu, RN  Outcome: Progressing

## 2025-02-18 NOTE — CARE COORDINATION
Quality round completed with care management team. [facility] to [facility]. Per Uri, checking to see if there is bed available at this time.     Electronically signed by ELIZABET Ernandez on 2/18/2025 at 9:31 AM    Per Berkley Grewal is able to accept pt.   Pending pre-cert at this time.     Electronically signed by ELIZABET Ernandez on 2/18/2025 at 10:32 AM    Updated pt's daughter.     Electronically signed by ELIZABET Ernandez on 2/18/2025 at 11:02 AM    Per brianna Grewal is asking for updated PT/OT note.   Paged PT.     Electronically signed by ELIZABET Ernandez on 2/18/2025 at 11:07 AM

## 2025-02-18 NOTE — FLOWSHEET NOTE
Am nursing  assessment completed.    Pt :  awake and resting             Alert and oriented.      Diet: tolerating pureed and nectar thick  Code Status:  cca      Oxygen: room air  Complaints of:  denies                       Pain:  IV:  sl            patent/ flushed/ capped, no signs of infiltration noted, dressing clean/dry/intact.  TELE:  sr               Dressings:                           Precautions:  DROPLET            Falls:  75      Jorge: 16  Chart and meds reviewed.           Noted Labs:   Plan for today:    Bed wheels locked and in lowest position. Call light and bedside table within reach.   NOTES:   1200 assisted to chair for lunch. Pt feeds self without difficulty. Completed linen change. Electronically signed by Etelvina Singer RN on 2/18/2025 at 12:46 PM    1321 returned to chair while working w physical therapy. Electronically signed by Etelvina Singer RN on 2/18/2025 at 1:21 PM    1600 awake and resting. No complaints at present. Electronically signed by Etelvina Singer RN on 2/18/2025 at 6:20 PM    1800 incont large loose stool. Pericare and repositioned. Electronically signed by Etelvina Singer RN on 2/18/2025 at 6:20 PM

## 2025-02-19 PROCEDURE — 97116 GAIT TRAINING THERAPY: CPT

## 2025-02-19 PROCEDURE — 6370000000 HC RX 637 (ALT 250 FOR IP)

## 2025-02-19 PROCEDURE — 6370000000 HC RX 637 (ALT 250 FOR IP): Performed by: INTERNAL MEDICINE

## 2025-02-19 PROCEDURE — 6360000002 HC RX W HCPCS

## 2025-02-19 PROCEDURE — 2500000003 HC RX 250 WO HCPCS

## 2025-02-19 PROCEDURE — 2060000000 HC ICU INTERMEDIATE R&B

## 2025-02-19 RX ADMIN — DOXYCYCLINE HYCLATE 100 MG: 100 CAPSULE ORAL at 08:21

## 2025-02-19 RX ADMIN — Medication 10 ML: at 20:48

## 2025-02-19 RX ADMIN — GUAIFENESIN 600 MG: 600 TABLET ORAL at 08:22

## 2025-02-19 RX ADMIN — APIXABAN 10 MG: 5 TABLET, FILM COATED ORAL at 20:48

## 2025-02-19 RX ADMIN — SERTRALINE 25 MG: 25 TABLET, FILM COATED ORAL at 08:22

## 2025-02-19 RX ADMIN — Medication 3 MG: at 20:48

## 2025-02-19 RX ADMIN — Medication 10 ML: at 08:22

## 2025-02-19 RX ADMIN — GUAIFENESIN 600 MG: 600 TABLET ORAL at 20:48

## 2025-02-19 RX ADMIN — METOPROLOL TARTRATE 50 MG: 50 TABLET, FILM COATED ORAL at 08:21

## 2025-02-19 RX ADMIN — METOPROLOL TARTRATE 50 MG: 50 TABLET, FILM COATED ORAL at 20:48

## 2025-02-19 RX ADMIN — HYDRALAZINE HYDROCHLORIDE 10 MG: 20 INJECTION INTRAMUSCULAR; INTRAVENOUS at 01:05

## 2025-02-19 RX ADMIN — APIXABAN 10 MG: 5 TABLET, FILM COATED ORAL at 08:22

## 2025-02-19 RX ADMIN — LEVETIRACETAM 500 MG: 500 TABLET, FILM COATED ORAL at 08:21

## 2025-02-19 RX ADMIN — LEVETIRACETAM 500 MG: 500 TABLET, FILM COATED ORAL at 20:48

## 2025-02-19 NOTE — DISCHARGE INSTR - COC
Molecular, with COVID-19 (Restricted: peds pts or suitable admitted adults)  history 02/19/25 Elvia Schneider, RN                       Nurse Assessment:  Last Vital Signs: BP (!) 88/47   Pulse 65   Temp 98.4 °F (36.9 °C) (Oral)   Resp 18   Ht 1.72 m (5' 7.72\")   Wt 51.2 kg (112 lb 12.8 oz)   SpO2 100%   BMI 17.29 kg/m²     Last documented pain score (0-10 scale): Pain Level: 0  Last Weight:   Wt Readings from Last 1 Encounters:   02/19/25 51.2 kg (112 lb 12.8 oz)     Mental Status:  disoriented and alert    IV Access:  - None    Nursing Mobility/ADLs:  Walking   Dependent  Transfer  Dependent  Bathing  Dependent  Dressing  Dependent  Toileting  Dependent  Feeding  Dependent  Med Admin  Dependent  Med Delivery   crushed    Wound Care Documentation and Therapy:        Elimination:  Continence:   Bowel: No  Bladder: No  Urinary Catheter: None   Colostomy/Ileostomy/Ileal Conduit: No       Date of Last BM: 02/20/25    Intake/Output Summary (Last 24 hours) at 2/19/2025 1330  Last data filed at 2/19/2025 0845  Gross per 24 hour   Intake 385 ml   Output --   Net 385 ml     I/O last 3 completed shifts:  In: 525 [P.O.:525]  Out: -     Safety Concerns:     At Risk for Falls and Aspiration Risk    Impairments/Disabilities:      None    Nutrition Therapy:  Current Nutrition Therapy:   - Oral Diet:  Dysphagia - Pureed  - Oral Nutrition Supplement:  Frozen Oral  twice a day    Routes of Feeding: Oral  Liquids: Nectar Thick Liquids  Daily Fluid Restriction: no  Last Modified Barium Swallow with Video (Video Swallowing Test): done on 02/14/25    Treatments at the Time of Hospital Discharge:   Respiratory Treatments: na  Oxygen Therapy:  is not on home oxygen therapy.  Ventilator:    - No ventilator support    Rehab Therapies: Physical Therapy and Occupational Therapy  Weight Bearing Status/Restrictions: No weight bearing restrictions  Other Medical Equipment (for information only, NOT a DME order):  walker  Other

## 2025-02-19 NOTE — CARE COORDINATION
Quality round completed with care management team. Plan remain to [facility]. Pre-cert started on 2/18/2025.   Pending pre-cert at this time.     Electronically signed by ELIZABET Ernandez on 2/19/2025 at 9:42 AM    PEND AUTH NUMBER: [id number]    Electronically signed by ELIZABET Enrandez on 2/19/2025 at 2:29 PM

## 2025-02-20 VITALS
TEMPERATURE: 97.5 F | HEART RATE: 64 BPM | DIASTOLIC BLOOD PRESSURE: 67 MMHG | BODY MASS INDEX: 17.52 KG/M2 | HEIGHT: 68 IN | SYSTOLIC BLOOD PRESSURE: 141 MMHG | WEIGHT: 115.6 LBS | RESPIRATION RATE: 17 BRPM | OXYGEN SATURATION: 99 %

## 2025-02-20 PROCEDURE — 6370000000 HC RX 637 (ALT 250 FOR IP)

## 2025-02-20 PROCEDURE — 97116 GAIT TRAINING THERAPY: CPT

## 2025-02-20 PROCEDURE — 6370000000 HC RX 637 (ALT 250 FOR IP): Performed by: INTERNAL MEDICINE

## 2025-02-20 PROCEDURE — 2060000000 HC ICU INTERMEDIATE R&B

## 2025-02-20 RX ORDER — METOPROLOL TARTRATE 50 MG
50 TABLET ORAL 2 TIMES DAILY
DISCHARGE
Start: 2025-02-20

## 2025-02-20 RX ADMIN — METOPROLOL TARTRATE 50 MG: 50 TABLET, FILM COATED ORAL at 08:55

## 2025-02-20 RX ADMIN — GUAIFENESIN 600 MG: 600 TABLET ORAL at 08:54

## 2025-02-20 RX ADMIN — APIXABAN 10 MG: 5 TABLET, FILM COATED ORAL at 08:55

## 2025-02-20 RX ADMIN — SERTRALINE 25 MG: 25 TABLET, FILM COATED ORAL at 08:54

## 2025-02-20 RX ADMIN — LEVETIRACETAM 500 MG: 500 TABLET, FILM COATED ORAL at 08:54

## 2025-02-20 NOTE — DISCHARGE INSTR - DIET
Good nutrition is important when healing from an illness, injury, or surgery.  Follow any nutrition recommendations given to you during your hospital stay.   If you were given an oral nutrition supplement while in the hospital, continue to take this supplement at home.  You can take it with meals, in-between meals, and/or before bedtime. These supplements can be purchased at most local grocery stores, pharmacies, and chain [employer]stores.   If you have any questions about your diet or nutrition, call the hospital and ask for the dietitian.      Dysphagia: pureed diet , nectar thick   Frozen oral supplement 2 times daily

## 2025-02-20 NOTE — CARE COORDINATION
Quality round completed with care management team. Plan remain to [facility]. Pre-cert started on 2/18/2025.   Pending auth number: [id number]    Pre-cert remain pending at this time.     Electronically signed by ELIZABET rEnandez on 2/20/2025 at 9:40 AM    AUTH APPROVE. TRANSPORTATION SET UP [facility].  TIME 7PM.   UPDATED PT, PHYSICIAN, RN AND FACILITY.   13540 DONE.   LSW LEFT VM TO PT'S DAUGHTER KAYLEE.     Electronically signed by ELIZABET Ernandez on 2/20/2025 at 3:11 PM

## 2025-02-20 NOTE — FLOWSHEET NOTE
IV and heart monitor removed. RN updated patient and family on transport. Report given to SNF nurse discharge paperwork place in discharge folder for transport.  Tried calling report to SNF 3 times. They are to call back and get report .Electronically signed by Toshia Pastor RN on 2/20/2025 at 5:48 PM

## 2025-02-21 NOTE — PROGRESS NOTES
[facility] Occupational Therapy      Date: 2025  Patient Name: Rayna Seals        MRN: [id number]  Account: [id number]   : 1940  (84 y.o.)  Room: [facility]/[place] [address]    Chart reviewed, attempted OT at 0900 for evaluation. Patient not seen 2° to:    Hold per nursing request due to: awaiting venous duplex.    Spoke to SCOTTY Gil RN aware. Will attempt again when able.    Electronically signed by EVERETT Jordan on 2025 at 9:58 AM    
2238  [facility]'s ambulance is here at this time to transport pt to [facility]. Pt Iv removed and monitor returned to monitor room by previous nurse. Report called into the facility by previous nurse as well. Pt family notified of transfer at this time.   
Assessment completed this shift. Alert and oriented to person. Pleasant and follows commands. Lungs diminished. 96 % on RA.Spoke with .  to follow up with daughter to ascertain home vs SNF.  Electronically signed by Veronica Thomason RN on 2/17/2025 at 2:27 PM    
Brought patient back from CT scan.  
Comprehensive Nutrition Assessment    Type and Reason for Visit:  Initial, Positive nutrition screen    Nutrition Recommendations/Plan:   ADULT DIET; Dysphagia - Minced and Moist; Mildly Thick (Nectar)  Modify nutrition supplement to meet dysphagia guidelines-frozen BID     Malnutrition Assessment:  Malnutrition Status:  Moderate malnutrition (02/13/25 1418)    Context:  Social/Environmental Circumstances     Findings of the 6 clinical characteristics of malnutrition:  Energy Intake:  Unable to assess  Weight Loss:  Mild weight loss     Body Fat Loss:  Mild body fat loss Orbital   Muscle Mass Loss:  Mild muscle mass loss Temples (temporalis)  Fluid Accumulation:  Unable to assess     Strength:  Not Performed    Nutrition Assessment:    Pt presents with moderate protein calorie malnutrition with mild wt loss and muscle wasting present. BSE today-minced and moist diet with thickened liquids. RD to provide thickened supplement and continue to monitor.    Nutrition Related Findings:    PMHx of CVA, dementia, anxiety, depression, and subdural hemorrhage. presents with a chief complaint of cough. Dx influenza A. Poor historian. Labs reviewed-hypernatremia. Mild hyperglycemia. Cachectic. +loose BMs. BSE 2/13=M+M, NTL Wound Type: None       Current Nutrition Intake & Therapies:    Average Meal Intake: Unable to assess  Average Supplements Intake: Unable to assess  ADULT DIET; Dysphagia - Minced and Moist; Mildly Thick (Nectar)  ADULT ORAL NUTRITION SUPPLEMENT; Breakfast, Lunch, Dinner; Standard High Calorie/High Protein Oral Supplement    Anthropometric Measures:  Height: 172 cm (5' 7.72\")  Ideal Body Weight (IBW): 139 lbs (63 kg)    Admission Body Weight: 50.2 kg (110 lb 10 oz)  Current Body Weight: 50.2 kg (110 lb 10 oz)Weight Source: Bed scale  Current BMI (kg/m2): 17  Usual Body Weight: 53.2 kg (117 lb 6 oz) (bed 1/2023)     % Weight Change (Calculated): -5.8                    BMI Categories: Underweight (BMI less 
Comprehensive Nutrition Assessment    Type and Reason for Visit:  Reassess    Nutrition Recommendations/Plan:   Continue Diet dysphagia pureed with mildly thick liquids   Continue to provide a thickened oral supplement BID      Malnutrition Assessment:  Malnutrition Status:  Moderate malnutrition (02/13/25 1418)    Context:  Social/Environmental Circumstances     Findings of the 6 clinical characteristics of malnutrition:  Energy Intake:  Unable to assess  Weight Loss:  Mild weight loss     Body Fat Loss:  Mild body fat loss Orbital   Muscle Mass Loss:  Mild muscle mass loss Temples (temporalis)  Fluid Accumulation:  Unable to assess     Strength:  Not Performed    Nutrition Assessment:    No progress towards nutrition related goals, po intakes have remained poor since admission per review of intake flowsheets. Appears she is doing well with intake of thickened liquids.  Pt requires assistance with meals in order to eat.  Unable to determine acceptance to oral supplement provided.    Nutrition Related Findings:    PMHx of CVA, dementia, anxiety, depression, and subdural hemorrhage. presents with a chief complaint of cough. Dx influenza A. Poor historian. Labs reviewed-hypernatremia. Mild hyperglycemia. Cachectic. +loose BMs. BSE 2/13=M+M, NTL Wound Type: None       Current Nutrition Intake & Therapies:    Average Meal Intake: 1-25%  Average Supplements Intake: Unable to assess  ADULT ORAL NUTRITION SUPPLEMENT; Lunch, Dinner; Frozen Oral Supplement  ADULT DIET; Dysphagia - Pureed; Mildly Thick (Nectar)    Anthropometric Measures:  Height: 172 cm (5' 7.72\")  Ideal Body Weight (IBW): 139 lbs (63 kg)    Admission Body Weight: 50.2 kg (110 lb 10 oz)  Current Body Weight: 50.2 kg (110 lb 10 oz), Weight Source: Bed scale  Current BMI (kg/m2): 17  Usual Body Weight: 53.2 kg (117 lb 6 oz) (bed 1/2023)     % Weight Change (Calculated): -5.8                    BMI Categories: Underweight (BMI less than 22) age over 
Follow-up on the patient.  Patient is sitting in bed.  She is on high flow oxygen.  Saturation 100% on 60L    Cachectic and frail in appearance.  Mildly tachypneic.  Chest exam revealed bilateral rhonchi.  Heart is regular, slightly tachycardic.  Abdomen soft, scaphoid in appearance.  Lower extremities no edema, muscle wasting atrophy    *Acute pulmonary embolism  Start the patient on Lovenox 1 mg/g twice a day with the conversion into Eliquis..  Venous study rule out DVT  .  Echocardiogram rule out heart strain    *Influenza respiratory infection  Started patient on Tamiflu    *Basilar pneumonia.  Start patient on ceftriaxone and doxycycline    *Acute hypoxic respiratory failure secondary to above  .  Unknown whether patient has underlying COPD    *UTI.  Urine culture is pending.  Continue ceftriaxone    *Flat elevation of the troponin.  Likely secondary to PE.  No clinical evidence of acute coronary syndrome.  No elevation or depression.  I suspect that patient has underlying CAD.  Consider ischemic evaluation when stable    *Acute kidney failure, dehydration and volume loss  Patient had received IV fluid infusion  .  Discontinue IV fluid at this time    *Moderate protein calorie malnutrition, cachexia, frailty, muscle wasting and atrophy  .  Oral protein supplementation  Swallow evaluation    *Anemia  No evidence of acute or massive blood loss.  Pt will likley required to have anemia w/u that could be done in out pt setting by PCP in collaboration with other needed out pt providers.  This may include but not limited to needing to have EGD, colonoscopy, Age appropriate cancer screening, hematological investiogation and others specific to pt situation and desire to proceed.    *Metabolic and hypoxic encephalopathy secondary to above.  No focal deficit.  CT head is negative for acute intracranial process      *Multiple other medical issues not listed above    
I called her dtr Dilia to give her update on condition, status and tx plan.     She did did not answer the phone.  I left a message for her..    
IV heparin infusion started.  
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    Influenza A 2025    Weakness 01/15/2025    Moderate protein-calorie malnutrition (HCC) 2025    Fall at home, initial encounter 2025      Influenza A  Shortness of breath  Hypoxemia  Acute septic pulmonary embolism without acute cor pulmonale (HCC)  Pulmonary embolism without acute cor pulmonale, unspecified chronicity, unspecified pulmonary embolism type (HCC)    Past Medical History:   Diagnosis Date    Anxiety and depression     Cerebral artery occlusion with cerebral infarction (HCC)     Dementia (HCC)     Subdural hemorrhage (HCC)      Past Surgical History:   Procedure Laterality Date    ANKLE FRACTURE SURGERY Left     CHOLECYSTECTOMY      TUBAL LIGATION          Restrictions  Restrictions/Precautions: Fall Risk                 Safety Devices: Safety Devices  Type of Devices: All fall risk precautions in place;Call light within reach;Left in bed     Patient's date of birth confirmed: Yes    General:  Patient assessed for rehabilitation services?: Yes    Subjective:Pt pleasant and cooperative.  Pt severely [place]          Pain at start of treatment: No    Pain at end of treatment: No    Location: N/A  Description: N/A  Nursing notified: No  RN: N/A  Intervention: Repositioned    Prior Level of Function:  Social/Functional History  Lives With:  (lives with daughter, information obtained from patient poor historian)  Type of Home: Apartment  Home Layout: One level  Bathroom Shower/Tub: Walk-in shower  Home Equipment: Walker - Rolling  Receives Help From: Family (daughter assists per patient with all 
[facility]   Facility/Department: [facility] 1W TELEMETRY  Speech Language Pathology    Rayna Seals  1940  [id number]    Date: 2/19/2025      Speech Therapy attempted to see Rayna Seals on this date for a/an:    Treatment    Pt was unable to be seen due to:   Patient is too lethargic to participate SLP attempted to see pt with lunch tray. Pt asleep upon arrival. Pt would ruse for 2-3 seconds and immediately fall back to sleep. Pt unable to maintain alertness for trials or exercises this date. Will re-attempt at next opportunity.         Electronically signed by HUDSON Martin on 2/19/25 at 11:51 AM EST   
[facility]  Facility/Department: [facility] 1W TELEMETRY  Speech Language Pathology   Treatment Note      Rayna Seals  1940  [id number]  [x]   confirmed      Date: 2025    Shortness of breath [R06.02]  Hypoxemia [R09.02]  Influenza A [J10.1]    Restrictions/Precautions: Fall Risk, Isolation    ADULT ORAL NUTRITION SUPPLEMENT; Lunch, Dinner; Frozen Oral Supplement  ADULT DIET; Dysphagia - Pureed; Mildly Thick (Loop)     Respiratory Status:O2 Flow Rate (L/min): (S) 2 L/min (25)   Droplet      Subjective:  Alert, Cooperative, and Confused        Interventions used this date:  Dysphagia Treatment      Objective/Assessment:  Patient progressing towards goals:  Goal 1: Patient will tolerate a puree diet with mildly thick liquids with adequate oral clearance with no overt s/s of difficulty or aspiration.  Nursing reported pt consuming approx 25% of meals.  Pt tolerated mildly thick water from cup with adequate oral control and no overt s/s of aspiration x 3 trials.  Goal 2: Patient will tolerate trials of minced and moist with adequate mastication and oral clearance with no overt s/s of difficulty or aspiration.  Pt trialed with minced peaches using small bites x 3.  Pt required increased time for mastication and bolus manipulation.  No oral residue post swallow.  Suspect loss of bolus to pharynx x 1 when pt attempted to talk, in which she brought hand to her throat.  Pt continues to present with congested respirations, cough at baseline.  Goal 3: Patient will demonstrate recommended swallow strategies for safe and efficient swallow at min assist level.  Goal 4: Patient will complete labial/buccal/lingual ROM/strengthening/coordination exercises 10x/each with min cues, to promote safety and efficiency of oral phase of swallow.  Goal 5: Pt will complete tongue press, tongue pull back, effortful swallow exercises 10x/each with min cues in order to strengthen the muscles of the swallow to increase 
[facility]  Facility/Department: [facility] [place] [facility] 1W TELEMETRY  Speech Language Pathology   Treatment Note      Rayna Seals  1940  [id number]  [x]   confirmed      Date: 2025    Shortness of breath [R06.02]  Hypoxemia [R09.02]  Influenza A [J10.1]    Restrictions/Precautions: Fall Risk, Isolation    ADULT DIET; Dysphagia - Minced and Moist; Mildly Thick (Nectar)  ADULT ORAL NUTRITION SUPPLEMENT; Lunch, Dinner; Frozen Oral Supplement     Respiratory Status:O2 Flow Rate (L/min): (S) 3 L/min (25 0741)   Droplet      Subjective:  Alert, Cooperative, and Confused        Interventions used this date:  Dysphagia Treatment      Objective/Assessment:  Patient progressing towards goals:  Goal 1: Pt will complete lingual/labial exercises x10/each to promote increased OM strength/coordination and improve oral phase of swallow with cues as needed.  Pt performed smil/pucker, cheek puff, tongue extension/retraction, and elevation 5x/each with mod cues.  Goal 2: Pt will tolerate minced/moist and mildly thick liquid diet with adequate mastication, timely A-P, with no evidence of pocketing or s/s of aspiration during a therapeutic meal monitor to assess diet tolerance and need for further intervention.  Pt tolerated mildly thick water from cup x 4 trials with no overt s/s of aspiration.  Moist respirations noted at baseline.  Goal 3: Pt will tolerate trials of soft and bite sized solids and thin liquids with adequate mastication, timely A-P, with no evidence of pocketing, anterior loss,  or s/s of aspiration.  Goal 4: Pt will participate in an instrumental procedure to assess oropharyngeal function and determine least restrictive diet as deemed appropriate by treating SLP.  Rec MBS later this date to further assess swallow  Discussed with Lilliam FAJARDO      Treatment/Activity Tolerance:  Patient tolerated treatment well    Plan:  Continue per POC    Pain Assessment:  Patient does not c/o pain.    Pain Re-assessment:  Patient 
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    Influenza A 2025    Weakness 01/15/2025    Moderate protein-calorie malnutrition (HCC) 2025    Fall at home, initial encounter 2025     Past Medical History:   Diagnosis Date    Anxiety and depression     Cerebral artery occlusion with cerebral infarction (HCC)     Dementia (HCC)     Subdural hemorrhage (HCC)      Past Surgical History:   Procedure Laterality Date    ANKLE FRACTURE SURGERY Left     CHOLECYSTECTOMY      TUBAL LIGATION       Allergies   Allergen Reactions    Nuts [Peanut-Containing Drug Products] Hives     Brazil nuts only    Shellfish-Derived Products Hives       DATE ONSET: 25    Date of Evaluation: 2025   Evaluating Therapist: Dilcia Gilman, SLP    Dysphagia Diagnosis  Dysphagia Diagnosis: Moderate oral stage dysphagia;Suspected needs further assessment  Dysphagia Impression : Clinical indicators of oral dysphagia and suspected pharyngeal dysphagia at bedside; likely acute due to current admission, increased confusion, and weakness. A PO diet is recommended with supervision and assistance with all PO intake. Due to pts cognitive status and pulomary status, pt is higher risk of pulmonary complications related to aspiration. Monitor pt closely and rec NPO if overt s/s of aspiration or change in respiratory status is noted. Patient's prognosis for diet advancement is good at this time given the patient's PLOF. ST to follow.    Recommended 
Neurology Follow up    SUBJECTIVE: History and evaluation obtained from the nurses patient in isolation with influenza.  Patient appears to be awake following commands and nonfocal but went into atrial fibrillation.  Cardiology on the case  Current Facility-Administered Medications   Medication Dose Route Frequency Provider Last Rate Last Admin    dilTIAZem 125 mg in sodium chloride 0.9 % 125 mL infusion  2.5-15 mg/hr IntraVENous Continuous Jade Aviles MD 10 mL/hr at 02/14/25 1334 10 mg/hr at 02/14/25 1334    apixaban (ELIQUIS) tablet 10 mg  10 mg Oral BID Jade Aviles MD   10 mg at 02/14/25 0837    Followed by    [START ON 2/21/2025] apixaban (ELIQUIS) tablet 5 mg  5 mg Oral BID Jade Aviles MD        levETIRAcetam (KEPPRA) tablet 500 mg  500 mg Oral BID Jade Aviles MD   500 mg at 02/14/25 0838    sertraline (ZOLOFT) tablet 25 mg  25 mg Oral Daily Jade Aviles MD   25 mg at 02/14/25 0838    ipratropium 0.5 mg-albuterol 2.5 mg (DUONEB) nebulizer solution 1 Dose  1 Dose Inhalation BID Jade Aviles MD   1 Dose at 02/14/25 0741    cefTRIAXone (ROCEPHIN) 1,000 mg in sterile water 10 mL IV syringe  1,000 mg IntraVENous Q24H Jade Aviles MD   1,000 mg at 02/14/25 0838    doxycycline (VIBRAMYCIN) 100 mg in sodium chloride 0.9 % 100 mL IVPB  100 mg IntraVENous Q12H Jade Aviles MD   Stopped at 02/14/25 1010    hydrALAZINE (APRESOLINE) injection 10 mg  10 mg IntraVENous Q4H PRN RepTrell searsAlisha, APRN - CNP   10 mg at 02/13/25 2352    sodium chloride flush 0.9 % injection 5-40 mL  5-40 mL IntraVENous 2 times per day Repko, Alisha, APRN - CNP   10 mL at 02/14/25 0903    sodium chloride flush 0.9 % injection 5-40 mL  5-40 mL IntraVENous PRN RepTrell searsAlisha, APRN - CNP        0.9 % sodium chloride infusion   IntraVENous PRN Alisha Cordon, APRN - CNP        magnesium sulfate 2000 mg in 50 mL IVPB premix  2,000 mg IntraVENous PRN Alisha Cordon, APRN - CNP        ondansetron (ZOFRAN-ODT) 
Patient asked if she wants to turn over, assists in turning, assists in repositioning. Follows all commands.  
Patient converted to a-fib with RVR. 12 lead EKG obtained.  
Patient is feeling and better.  She continues to be in A-fib but heart rate is about 80  Less tachypneic than before  .    Cachectic and frail in appearance.  Mildly tachypneic.  Chest exam revealed bilateral rhonchi.  Heart is irregular, heart rate was 140 this morning abdomen soft, scaphoid in appearance.  Lower extremities no edema, muscle wasting atrophy    *Acute pulmonary embolism  Continue Eliquis 10 mg twice a day for 7 days followed by 5 mg twice a day  .  Echocardiogram rule out heart strain    *A-fib with RVR, rate now is under control.  Continue Lopressor p.o.  Continue Eliquis for embolic stroke prevention    *Influenza respiratory infection  Continue Tamiflu.  Course    *Basilar pneumonia.  Cont patient on ceftriaxone and doxycycline.  Patient will be completing ceftriaxone course tomorrow.  Switch doxycycline to p.o.    *Acute hypoxic respiratory failure secondary to above  .  Unknown whether patient has underlying COPD    *UTI.  Urine culture is positive for E. coli.  Patient will be completing 5 days course    *Dysphagia  Patient had MBS.  She is recommended to have puréed diet, mildly thick liquid, upright position, small bites, double swallow.    *Flat elevation of the troponin.  Likely secondary to PE.  No clinical evidence of acute coronary syndrome.  No elevation or depression.  I suspect that patient has underlying CAD.  Consider ischemic evaluation when stable.    *Acute kidney failure, dehydration and volume loss  Resolved    *Nodular density in the left base.  Cannot exclude malignancy  Recommend to repeat CAT scan in 2 to 3 months to exclude the possibility of underlying mass.  I discussed this with her daughter Johanna.    *Moderate protein calorie malnutrition, cachexia, frailty, muscle wasting and atrophy  .  Oral protein supplementation  Swallow evaluation    *Anemia  No evidence of acute or massive blood loss.  Pt will likley required to have anemia w/u that could be done in out pt 
Patient is feeling better.  However her heart rate today is 140.  Patient was given IV Lopressor and digoxin.  Less tachypneic than before  Patient developed A-fib with   Patient was started on Cardizem drip after failure to control her heart rate with intravenous Lopressor.    Cachectic and frail in appearance.  Mildly tachypneic.  Chest exam revealed bilateral rhonchi.  Heart is irregular, heart rate was 140 this morning abdomen soft, scaphoid in appearance.  Lower extremities no edema, muscle wasting atrophy    *Acute pulmonary embolism  Continue Eliquis 10 mg twice a day for 7 days followed by 5 mg twice a day  .  Echocardiogram rule out heart strain    *A-fib with RVR  .  Patient was given IV Lopressor and digoxin  Patient will be given oral Lopressor.  Heart rate slowed down to 80    *Influenza respiratory infection  Continue Tamiflu    *Basilar pneumonia.  Cont patient on ceftriaxone and doxycycline    *Acute hypoxic respiratory failure secondary to above  .  Unknown whether patient has underlying COPD    *UTI.  Urine culture is positive for E. coli.  Continue ceftriaxone.    *Dysphagia  Patient had MBS.  She is recommended to have puréed diet, mildly thick liquid, upright position, small bites, double swallow.    *Flat elevation of the troponin.  Likely secondary to PE.  No clinical evidence of acute coronary syndrome.  No elevation or depression.  I suspect that patient has underlying CAD.  Consider ischemic evaluation when stable.    *Acute kidney failure, dehydration and volume loss  Resolved    *Nodular density in the left base.  Cannot exclude malignancy  Recommend to repeat CAT scan in 2 to 3 months to exclude the possibility of underlying mass.  I discussed this with her daughter Johanna.    *Moderate protein calorie malnutrition, cachexia, frailty, muscle wasting and atrophy  .  Oral protein supplementation  Swallow evaluation    *Anemia  No evidence of acute or massive blood loss.  Pt will 
Patient is feeling better.  Less tachypneic than before  Patient developed A-fib with   Patient was started on Cardizem drip after failure to control her heart rate with intravenous Lopressor.    Cachectic and frail in appearance.  Mildly tachypneic.  Chest exam revealed bilateral rhonchi.  Heart is irregular, slightly tachycardic.  Abdomen soft, scaphoid in appearance.  Lower extremities no edema, muscle wasting atrophy    *Acute pulmonary embolism  Continue Eliquis 10 mg twice a day for 7 days followed by 5 mg twice a day  .  Echocardiogram rule out heart strain    *Influenza respiratory infection  Continue Tamiflu    *Basilar pneumonia.  Cont patient on ceftriaxone and doxycycline    *Acute hypoxic respiratory failure secondary to above  .  Unknown whether patient has underlying COPD    *UTI.  Urine culture is positive for E. coli.  Continue ceftriaxone    *Flat elevation of the troponin.  Likely secondary to PE.  No clinical evidence of acute coronary syndrome.  No elevation or depression.  I suspect that patient has underlying CAD.  Consider ischemic evaluation when stable.    *Acute kidney failure, dehydration and volume loss  Resolved    *Nodular density in the left base.  Cannot exclude malignancy  Recommend to repeat CAT scan in 2 to 3 months to exclude the possibility of underlying mass.  I discussed this with her daughter Johanna.    *Moderate protein calorie malnutrition, cachexia, frailty, muscle wasting and atrophy  .  Oral protein supplementation  Swallow evaluation    *Anemia  No evidence of acute or massive blood loss.  Pt will likley required to have anemia w/u that could be done in out pt setting by PCP in collaboration with other needed out pt providers.  This may include but not limited to needing to have EGD, colonoscopy, Age appropriate cancer screening, hematological investiogation and others specific to pt situation and desire to proceed.    *Metabolic and hypoxic encephalopathy 
Patient moved into room !86 via gurney, slid over. Attempted to get patient to open her eyes or respond, no responses. Slight movement of her eyelids to loud voice.    Patient on 2L of oxygen, 96%. Changed brief for large amount of urine, made comfortable.  
Patient ordered DNR CCA.  
Patient with no acute complaints this morning. Remains on RA.     Cachectic and frail in appearance. Normal respiratory effort.  Lung sounds clear. Heart is irregular, heart rate was regular this morning abdomen soft, scaphoid in appearance.  Lower extremities no edema, muscle wasting atrophy    *Acute pulmonary embolism  Continue Eliquis 10 mg twice a day for 7 days followed by 5 mg twice a day  .  Echocardiogram negative for  rule out heart strain    *A-fib with RVR, rate now is under control.  Continue Lopressor p.o.  Continue Eliquis for embolic stroke prevention    *Influenza respiratory infection  Continue Tamiflu.  Course    *Basilar pneumonia.  Cont patient on doxycycline, completed course of rocephin    *Acute hypoxic respiratory failure secondary to above  .  Unknown whether patient has underlying COPD    *UTI.  Urine culture is positive for E. coli.  Patient will be completing 5 days course    *Dysphagia  Patient had MBS.  She is recommended to have puréed diet, mildly thick liquid, upright position, small bites, double swallow.    *Flat elevation of the troponin.  Likely secondary to PE.  No clinical evidence of acute coronary syndrome.  No elevation or depression.  I suspect that patient has underlying CAD.  Consider ischemic evaluation when stable.    *Acute kidney failure, dehydration and volume loss  Resolved    *Nodular density in the left base.  Cannot exclude malignancy  Recommend to repeat CAT scan in 2 to 3 months to exclude the possibility of underlying mass.   daughter Johanna  is aware     *Moderate protein calorie malnutrition, cachexia, frailty, muscle wasting and atrophy  .  Oral protein supplementation  Swallow evaluation    *Anemia  No evidence of acute or massive blood loss.  Pt will likley required to have anemia w/u that could be done in out pt setting by PCP in collaboration with other needed out pt providers.  This may include but not limited to needing to have EGD, colonoscopy, 
Patient with no acute complaints this morning. She continues to be in A-fib but heart rate is < 100. I spoke at length with daughter over phone and she ultimately was agreeable to look into SNF options for dc    Cachectic and frail in appearance. Normal respiratory effort.  Lung sounds clear. Heart is irregular, heart rate was regular this morning abdomen soft, scaphoid in appearance.  Lower extremities no edema, muscle wasting atrophy    *Acute pulmonary embolism  Continue Eliquis 10 mg twice a day for 7 days followed by 5 mg twice a day  .  Echocardiogram negative for  rule out heart strain    *A-fib with RVR, rate now is under control.  Continue Lopressor p.o.  Continue Eliquis for embolic stroke prevention    *Influenza respiratory infection  Continue Tamiflu.  Course    *Basilar pneumonia.  Cont patient on doxycycline, completed course of rocephin    *Acute hypoxic respiratory failure secondary to above  .  Unknown whether patient has underlying COPD    *UTI.  Urine culture is positive for E. coli.  Patient will be completing 5 days course    *Dysphagia  Patient had MBS.  She is recommended to have puréed diet, mildly thick liquid, upright position, small bites, double swallow.    *Flat elevation of the troponin.  Likely secondary to PE.  No clinical evidence of acute coronary syndrome.  No elevation or depression.  I suspect that patient has underlying CAD.  Consider ischemic evaluation when stable.    *Acute kidney failure, dehydration and volume loss  Resolved    *Nodular density in the left base.  Cannot exclude malignancy  Recommend to repeat CAT scan in 2 to 3 months to exclude the possibility of underlying mass.   daughter Johanna  is aware     *Moderate protein calorie malnutrition, cachexia, frailty, muscle wasting and atrophy  .  Oral protein supplementation  Swallow evaluation    *Anemia  No evidence of acute or massive blood loss.  Pt will likley required to have anemia w/u that could be done in 
Patient woke up! A&O x 4 making jokes, smiling!      
Patient's ABG poor. Plan to use high flow oxygen, then if no better, intubation. Called Floridalma to let her know she is probably worsening and because she is a full code, we will intubate her if she is worse.  
Patient's daughter requesting . Called  on call.  
Patient's daughter, Floridalma, at bedside.  
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    Influenza A 2025    Weakness 01/15/2025    Moderate protein-calorie malnutrition 2025    Fall at home, initial encounter 2025        Past Medical History:   Diagnosis Date    Anxiety and depression     Cerebral artery occlusion with cerebral infarction (HCC)     Dementia (HCC)     Subdural hemorrhage (HCC)      Past Surgical History:   Procedure Laterality Date    ANKLE FRACTURE SURGERY Left     CHOLECYSTECTOMY      TUBAL LIGATION              Restrictions:  Restrictions/Precautions: Fall Risk    SUBJECTIVE:   Subjective: Pt stated that she would like to walk    Pain   0/10 pre and post pain     OBJECTIVE:       Transfers  Sit to Stand: Minimal Assistance  Stand to Sit: Minimal Assistance    Ambulation  Surface: Level tile  Device: Rolling Walker  Assistance: Minimal assistance  Quality of Gait: jude flexed knees increased sance and speed  Distance: 35    ASSESSMENT   Body Structures, Functions, Activity Limitations Requiring Skilled Therapeutic Intervention: Decreased posture;Decreased functional mobility ;Decreased strength;Decreased cognition;Decreased balance  Assessment: Pt agreeable to walking this date. Pt  able to ambulate further with less assist this date. Vc needed to stay close to ww.  Treatment Diagnosis: Impaired mobility     Discharge Recommendations:  Continue to assess pending progress         Goals  Long Term Goals  Time Frame for Long Term Goals : 1-2 
Physical Therapy  Physical Therapy Missed Treatment   Facility/Department: [facility] MED SURG W186/W186-01    NAME: Rayna Seals    : 1940 (84 y.o.)  MRN: [id number]    Account: [id number]  Gender: female    Chart reviewed, attempted PT at 13:36. Patient unavailable 2° to:    Pt declined to take part in therapy, pt with minimal interaction and difficulty getting an answer from therapy in regards to therapy. Pt stated No x2 when asked about therapy, otherwise pt would just shake her head no to any additional questions asked at this time.       Will attempt PT treatment again at earliest convenience.      Electronically signed by Barak Hernandez PTA on 25 at 1:36 PM EST      
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 She is pleasantly confused but following instruction with repetition.    Restrictions:  Restrictions/Precautions: Fall Risk;Isolation    SUBJECTIVE:   Subjective: Is it good for me to walk?    Pain  Pain  Pre-Pain: 0  Post-Pain: 0     OBJECTIVE:   Orientation  Orientation Level: Oriented to person  Cognition  Cognition Comment: follows simple one step commands with increased time and repetition PRN    Bed mobility  Bed Mobility Comments: NT - up in chair pre and post session    Transfers  Sit to Stand: Minimal Assistance  Stand to Sit: Minimal Assistance  Bed to Chair: Minimal assistance  Comment: vc's for hand placement and technique with WW. Lifting assist required to complete standing.    Ambulation  Surface: Level tile  Device: Rolling Walker  Assistance: Minimal assistance  Quality of Gait: jude flexed knees and hips, short shuffling steps with absent hs and toe off  Gait 
Physical Therapy Med Surg Daily Treatment Note  Facility/Department: [address] TELEMETRY  Room: [place] [address]       NAME: Rayna Seals  : 1940 (84 y.o.)  MRN: [id number]  CODE STATUS: DNR-CCA    Date of Service: 2/15/2025    Patient Diagnosis(es): Shortness of breath [R06.02]  Hypoxemia [R09.02]  Influenza A [J10.1]   Chief Complaint   Patient presents with    Illness     Daughter felt pt sound bad     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    Influenza A 2025    Weakness 01/15/2025    Moderate protein-calorie malnutrition (HCC) 2025    Fall at home, initial encounter 2025        Past Medical History:   Diagnosis Date    Anxiety and depression     Cerebral artery occlusion with cerebral infarction (HCC)     Dementia (HCC)     Subdural hemorrhage (HCC)      Past Surgical History:   Procedure Laterality Date    ANKLE FRACTURE SURGERY Left     CHOLECYSTECTOMY      TUBAL LIGATION         Chart Reviewed: Yes  Family/Caregiver Present: No    Restrictions:  Restrictions/Precautions: Fall Risk;Isolation    SUBJECTIVE:   Subjective: pt agreeable to get into bedside chair.    Pain  Pain  Pre-Pain: 0  Post-Pain: 0    OBJECTIVE:        Bed mobility  Supine to Sit: Partial/Moderate assistance (vc's for sequencing and improved efficiency of movement.)  Sit to Supine:  (DNT pt into bedside chair to promote OOB activity.)    Transfers  Sit to Stand: Contact guard assistance  Stand to Sit: Contact guard assistance  Comment: vc's for hand placement, pt impulsive, completes several STS during pericare.    Ambulation  Surface: Level tile  Device: Rolling Walker  Assistance: Contact guard assistance  Gait Deviations: Slow Tracey;Decreased step length  Distance: 10' in room to bedside chair.                              Activity Tolerance  Activity Tolerance: Patient tolerated treatment well          ASSESSMENT   Assessment: pt reports being near 
Physical Therapy Med Surg Initial Assessment  Facility/Department: [facility]  Room: [place] [address]       NAME: Rayna Seals  : 1940 (84 y.o.)  MRN: [id number]  CODE STATUS: DNR-CCA    Date of Service: 2025    Patient Diagnosis(es): Shortness of breath [R06.02]  Hypoxemia [R09.02]  Influenza A [J10.1]   Chief Complaint   Patient presents with    Illness     Daughter felt pt sound bad     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    Influenza A 2025    Weakness 01/15/2025    Moderate protein-calorie malnutrition (HCC) 2025    Fall at home, initial encounter 2025        Past Medical History:   Diagnosis Date    Anxiety and depression     Cerebral artery occlusion with cerebral infarction (HCC)     Dementia (HCC)     Subdural hemorrhage (HCC)      Past Surgical History:   Procedure Laterality Date    ANKLE FRACTURE SURGERY Left     CHOLECYSTECTOMY      TUBAL LIGATION         Chart Reviewed: Yes  Patient assessed for rehabilitation services?: Yes    Restrictions:  Restrictions/Precautions: Fall Risk, Isolation     SUBJECTIVE:   Subjective: Pt agreeable to PT evaluation. Denies pain or Numbness tingling    Pain   Denies pain       Prior Level of Function:  Social/Functional History  Lives With:  (lives with daughter, information obtained from patient poor historian)  Type of Home: Apartment  Home Layout: One level  Bathroom Shower/Tub: Walk-in shower  Home Equipment: Walker - Rolling  Receives Help From: Family (daughter assists per patient with all ADL's)    OBJECTIVE:   Vision  Vision:  (glasses)  Hearing: Exceptions to WFL  Hearing Exceptions: Hard of hearing/hearing concerns    Cognition:  Orientation Level: Oriented to person  Cognition Comment: follows simple one step commands with increased time and repetition PRN    Observation/Palpation  Observation: On high flow O2    ROM:  RLE AROM: WFL  LLE AROM : WFL  RUE 
Physical Therapy Missed Treatment   Facility/Department: [facility] MED SURG W186/W186-01    NAME: Rayna Seals    : 1940 (84 y.o.)  MRN: [id number]    Account: [id number]  Gender: female       Nursing staff notified. Waiting results from duplex to initiate PT evaluation.       Will follow and attempt PT evaluation again at earliest availability.       Katrina Tello, PT, 25 at 1:00 PM    
Physical Therapy Missed Treatment   Facility/Department: [facility] MED SURG W186/W186-01    NAME: Rayna Seals    : 1940 (84 y.o.)  MRN: [id number]    Account: [id number]  Gender: female    Chart reviewed, attempted PT at 1407. Patient unavailable 2° to:      [x] Pt.. off floor for test/procedure.         Will attempt PT treatment again at earliest convenience.      Electronically signed by Dilia Enrique PTA on 25 at 2:07 PM EST    
Physical Therapy Missed Treatment   Facility/Department: [facility] MED SURG W186/W186-01    NAME: Rayna Seals    : 1940 (84 y.o.)  MRN: [id number]    Account: [id number]  Gender: female      PT evaluation and treatment orders received. Chart reviewed. Hold per RN pending B LE duplex results.       Will follow and attempt PT evaluation again at earliest availability.       Mily Mitchell, PT, 25 at 9:20 AM    
Spoke with Floridalma. She was discharged on Sunday from SNF. Patient is slowly more non-verbal. Sometimes opens her eyes, but worsening. Since going home. Noticed wet cough on Monday and brought her in.  
Taken to CT scan after labs drawn.  
proximity to assistive device, short step length and height with absent hs and toe off  Gait Deviations: Decreased step height;Decreased arm swing;Decreased step length;Slow Tracey  Distance: 24 feet in room  More Ambulation?: No    Stairs/Curb  Stairs?: No    PT Exercises  Exercise Treatment: bridge x5 (unable to clear buttocks)        ASSESSMENT   Assessment: Patient worked toward gait, bed mobility and transfers this session. She demonstrated quick fatigue with unsafe approach to chair after gait training. Ble's fatigued after 15 feet resulting in increased assist needed from therapist to weight shift and steady. Patient would benefit from continued skilled therapy to return to of.  Treatment Diagnosis: Impaired mobility     Discharge Recommendations:  Continue to assess pending progress         Goals  Long Term Goals  Time Frame for Long Term Goals : 1-2 weeks  Long Term Goal 1: Supervised bed mobility  Long Term Goal 2: Supervised transfers  Long Term Goal 3: Ambulate with CGA with or without approp assistive device 25ft safely  Patient Goals   Patient Goals : mprove function    PLAN    General Plan: 1 time a day 3-6 times a week        AMPAC (6 CLICK) BASIC MOBILITY  AM-PAC Inpatient Mobility Raw Score : 17     Therapy Time   Individual   Time In 1303   Time Out 1326   Minutes 23      Transfers 15 min  Gait 8 min       Evangelina Francisco PTA, 02/18/25 at 1:29 PM         Definitions for assistance levels  Independent = pt does not require any physical supervision or assistance from another person for activity completion. Device may be needed.  Stand by assistance = pt requires verbal cues or instructions from another person, close to but not touching, to perform the activity  Minimal assistance= pt performs 75% or more of the activity; assistance is required to complete the activity  Moderate assistance= pt performs 50% of the activity; assistance is required to complete the activity  Maximal assistance = pt 
FINDINGS: Pulmonary Arteries: Pulmonary arteries are adequately opacified for evaluation.  Nonocclusive filling defects identified within the segmental and subsegmental branches of the lower lobes bilaterally as well as the inferior aspect of the left upper lobe.  There is nonocclusive filling defects seen within the right main pulmonary artery extending into the right upper lobar and segmental branches.  No definite occlusive thrombus present.  No evidence of right heart strain..  Main pulmonary artery is normal in caliber. Mediastinum: No evidence of mediastinal lymphadenopathy.  Cardiac chambers are enlarged.  Coronary artery calcification identified.  No pericardial effusion.  Extensive atherosclerotic disease seen within the thoracic aorta. The heart and pericardium demonstrate no acute abnormality.  There is no acute abnormality of the thoracic aorta. Lungs/pleura: There is bronchial wall thickening identified throughout the perihilar regions bilaterally.  There is some consolidative infiltrate extending into the lower lobes bilaterally left greater than right concerning for bibasilar infiltrate.  Trace left pleural effusion.  Chronic and emphysematous changes seen within the lung fields bilaterally.  Minimal atelectatic changes inferiorly within the left upper lobe. Upper Abdomen: Limited images of the upper abdomen are unremarkable. Soft Tissues/Bones: Multilevel degenerative changes seen within the spine. No acute displaced rib fracture.  No acute chest wall abnormality.     1. Findings positive for PE.  Scattered nonocclusive pulmonary emboli identified within the segmental and subsegmental branches of the upper lobes and lower lobes bilaterally with nonocclusive thrombus seen within the right main pulmonary artery extending into the right upper lobar branch..  No evidence of right heart strain. 2. Bronchial wall thickening identified throughout the perihilar regions bilaterally with some consolidative

## 2025-02-22 NOTE — DISCHARGE SUMMARY
Hospital Medicine Discharge Summary    Rayna Seals  :  1940  MRN:  [id number]    Admit date:  2025  Discharge date:  2025    Admitting Physician:  Frank Yusuf MD  Primary Care Physician:  Britni Davenport MD      Chief Complaint   Patient presents with    Illness     Daughter felt pt sound bad     Hospital Course:       84 y.o. female with a PMHx of CVA, dementia, anxiety, depression, and subdural hemorrhage presented with cough. Management while inpatient as described below:       *Acute pulmonary embolism  Continue Eliquis   .  Echocardiogram negative for  rule out heart strain     *A-fib with RVR, rate now is under control.  Continue Lopressor p.o.  Continue Eliquis for embolic stroke prevention     *Influenza respiratory infection  Continue Tamiflu.  Course     *Basilar pneumonia.  Cont patient on doxycycline, completed course of rocephin     *Acute hypoxic respiratory failure secondary to above  .  Unknown whether patient has underlying COPD     *UTI.  Urine culture is positive for E. coli.  Patient will be completing 5 days course     *Dysphagia  Patient had MBS.  She is recommended to have puréed diet, mildly thick liquid, upright position, small bites, double swallow.     *Flat elevation of the troponin.  Likely secondary to PE.  No clinical evidence of acute coronary syndrome.  No elevation or depression.  I suspect that patient has underlying CAD.  Consider ischemic evaluation when stable.     *Acute kidney failure, dehydration and volume loss  Resolved     *Nodular density in the left base.  Cannot exclude malignancy  Recommend to repeat CAT scan in 2 to 3 months to exclude the possibility of underlying mass.   daughter Johanna  is aware      *Moderate protein calorie malnutrition, cachexia, frailty, muscle wasting and atrophy  .  Oral protein supplementation  Swallow evaluation     *Anemia  No evidence of acute or massive blood loss.  Pt will likley required to have anemia w/u that

## 2025-03-14 PROBLEM — J10.1 INFLUENZA A: Status: RESOLVED | Noted: 2025-02-12 | Resolved: 2025-03-14

## 2025-05-16 ENCOUNTER — APPOINTMENT (OUTPATIENT)
Dept: CT IMAGING | Age: 85
End: 2025-05-16
Payer: MEDICARE

## 2025-05-16 ENCOUNTER — HOSPITAL ENCOUNTER (EMERGENCY)
Age: 85
Discharge: HOME | End: 2025-05-17
Payer: MEDICARE

## 2025-05-16 VITALS
WEIGHT: 107.3 LBS | HEART RATE: 85 BPM | DIASTOLIC BLOOD PRESSURE: 71 MMHG | SYSTOLIC BLOOD PRESSURE: 142 MMHG | OXYGEN SATURATION: 98 % | BODY MASS INDEX: 16.45 KG/M2 | RESPIRATION RATE: 16 BRPM | TEMPERATURE: 98.4 F

## 2025-05-16 DIAGNOSIS — S09.90XA INJURY OF HEAD, INITIAL ENCOUNTER: Primary | ICD-10-CM
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
## 2025-05-16 LAB
EKG ATRIAL RATE: 76 BPM
EKG DIAGNOSIS: NORMAL
EKG P AXIS: 63 DEGREES
EKG P-R INTERVAL: 144 MS
EKG Q-T INTERVAL: 434 MS
EKG QRS DURATION: 86 MS
EKG QTC CALCULATION (BAZETT): 488 MS
EKG R AXIS: 50 DEGREES
EKG T AXIS: 63 DEGREES
EKG VENTRICULAR RATE: 76 BPM

## 2025-05-16 PROCEDURE — 93005 ELECTROCARDIOGRAM TRACING: CPT

## 2025-05-16 PROCEDURE — 70450 CT HEAD/BRAIN W/O DYE: CPT

## 2025-05-16 PROCEDURE — 99284 EMERGENCY DEPT VISIT MOD MDM: CPT

## 2025-05-16 PROCEDURE — 72125 CT NECK SPINE W/O DYE: CPT

## 2025-05-16 ASSESSMENT — PAIN DESCRIPTION - LOCATION: LOCATION: HEAD

## 2025-05-16 ASSESSMENT — PAIN - FUNCTIONAL ASSESSMENT: PAIN_FUNCTIONAL_ASSESSMENT: 0-10

## 2025-05-16 ASSESSMENT — PAIN DESCRIPTION - PAIN TYPE: TYPE: ACUTE PAIN

## 2025-05-16 ASSESSMENT — PAIN SCALES - GENERAL: PAINLEVEL_OUTOF10: 4

## 2025-05-16 ASSESSMENT — PAIN DESCRIPTION - DESCRIPTORS: DESCRIPTORS: SORE

## 2025-05-18 NOTE — ED PROVIDER NOTES
2:57 AM EDT  [facility] EMERGENCY DEPARTMENT  EMERGENCY DEPARTMENT ENCOUNTER      Pt Name: Rayna Seals  MRN: [id number]  Birthdate 1940  Date of evaluation: 5/16/2025  Provider: Shahana Hernandez MD    CHIEF COMPLAINT       Chief Complaint   Patient presents with    Fall     Patient fell from standing, striking head on door. Small lac to back of head noted. -LOC         HISTORY OF PRESENT ILLNESS   (Location/Symptom, Timing/Onset, Context/Setting, Quality, Duration, Modifying Factors, Severity)  Note limiting factors.       Rayna Seals is a 84 y.o. female with a past medical history of dementia, prior CVA who presents to the emergency department after sustaining a mechanical fall.  Patient is accompanied by her daughter, who is contributing to the history.  Per report obtained from patient's daughter and patient, patients daughter had gone outside to take out the garbage, and when she came back, she noted that the patient was attempting to ambulate to the bathroom, and fell, hitting the side of her head against the door handle.  Patient and daughter both deny any loss of consciousness.  Patient denies any other injury.  Patient is not on any blood thinners.  No other medical complaints.  HPI    Nursing Notes were reviewed.    REVIEW OF SYSTEMS    (2-9 systems for level 4, 10 or more for level 5)     Review of Systems   All other systems reviewed and are negative.      Except as noted above the remainder of the review of systems was reviewed and negative.       PAST MEDICAL HISTORY     Past Medical History:   Diagnosis Date    Anxiety and depression     Cerebral artery occlusion with cerebral infarction (HCC)     Dementia (HCC)     Subdural hemorrhage (HCC)          SURGICAL HISTORY       Past Surgical History:   Procedure Laterality Date    ANKLE FRACTURE SURGERY Left     CHOLECYSTECTOMY      TUBAL LIGATION           CURRENT MEDICATIONS       Discharge Medication List as of 5/17/2025  1:24 AM
